# Patient Record
Sex: MALE | Race: WHITE | NOT HISPANIC OR LATINO | Employment: OTHER | URBAN - METROPOLITAN AREA
[De-identification: names, ages, dates, MRNs, and addresses within clinical notes are randomized per-mention and may not be internally consistent; named-entity substitution may affect disease eponyms.]

---

## 2017-04-04 ENCOUNTER — HOSPITAL ENCOUNTER (OUTPATIENT)
Dept: LAB | Facility: MEDICAL CENTER | Age: 67
End: 2017-04-04
Attending: INTERNAL MEDICINE
Payer: MEDICARE

## 2017-04-04 DIAGNOSIS — E11.9 DIABETES TYPE 2, CONTROLLED (HCC): ICD-10-CM

## 2017-04-04 DIAGNOSIS — E78.5 DYSLIPIDEMIA: ICD-10-CM

## 2017-04-04 LAB
ALBUMIN SERPL BCP-MCNC: 4.4 G/DL (ref 3.2–4.9)
ALBUMIN/GLOB SERPL: 1.5 G/DL
ALP SERPL-CCNC: 77 U/L (ref 30–99)
ALT SERPL-CCNC: 26 U/L (ref 2–50)
ANION GAP SERPL CALC-SCNC: 5 MMOL/L (ref 0–11.9)
AST SERPL-CCNC: 24 U/L (ref 12–45)
BILIRUB SERPL-MCNC: 0.7 MG/DL (ref 0.1–1.5)
BUN SERPL-MCNC: 16 MG/DL (ref 8–22)
CALCIUM SERPL-MCNC: 9.4 MG/DL (ref 8.4–10.2)
CHLORIDE SERPL-SCNC: 99 MMOL/L (ref 96–112)
CHOLEST SERPL-MCNC: 112 MG/DL (ref 100–199)
CO2 SERPL-SCNC: 29 MMOL/L (ref 20–33)
CREAT SERPL-MCNC: 0.77 MG/DL (ref 0.5–1.4)
CREAT UR-MCNC: 101.8 MG/DL
EST. AVERAGE GLUCOSE BLD GHB EST-MCNC: 114 MG/DL
GFR SERPL CREATININE-BSD FRML MDRD: >60 ML/MIN/1.73 M 2
GLOBULIN SER CALC-MCNC: 3 G/DL (ref 1.9–3.5)
GLUCOSE SERPL-MCNC: 107 MG/DL (ref 65–99)
HBA1C MFR BLD: 5.6 % (ref 0–5.6)
HDLC SERPL-MCNC: 29 MG/DL
LDLC SERPL CALC-MCNC: 69 MG/DL
MICROALBUMIN UR-MCNC: <0.7 MG/DL
MICROALBUMIN/CREAT UR: NORMAL MG/G (ref 0–30)
POTASSIUM SERPL-SCNC: 3.7 MMOL/L (ref 3.6–5.5)
PROT SERPL-MCNC: 7.4 G/DL (ref 6–8.2)
SODIUM SERPL-SCNC: 133 MMOL/L (ref 135–145)
TRIGL SERPL-MCNC: 70 MG/DL (ref 0–149)

## 2017-04-04 PROCEDURE — 82570 ASSAY OF URINE CREATININE: CPT

## 2017-04-04 PROCEDURE — 82043 UR ALBUMIN QUANTITATIVE: CPT

## 2017-04-04 PROCEDURE — 36415 COLL VENOUS BLD VENIPUNCTURE: CPT

## 2017-04-04 PROCEDURE — 83036 HEMOGLOBIN GLYCOSYLATED A1C: CPT

## 2017-04-04 PROCEDURE — 80053 COMPREHEN METABOLIC PANEL: CPT

## 2017-04-04 PROCEDURE — 80061 LIPID PANEL: CPT

## 2017-04-06 ENCOUNTER — OFFICE VISIT (OUTPATIENT)
Dept: MEDICAL GROUP | Facility: MEDICAL CENTER | Age: 67
End: 2017-04-06
Payer: MEDICARE

## 2017-04-06 VITALS
OXYGEN SATURATION: 96 % | WEIGHT: 225 LBS | RESPIRATION RATE: 16 BRPM | HEART RATE: 80 BPM | DIASTOLIC BLOOD PRESSURE: 64 MMHG | BODY MASS INDEX: 27.4 KG/M2 | TEMPERATURE: 98.7 F | HEIGHT: 76 IN | SYSTOLIC BLOOD PRESSURE: 132 MMHG

## 2017-04-06 DIAGNOSIS — E11.69 CONTROLLED TYPE 2 DIABETES MELLITUS WITH OTHER SPECIFIED COMPLICATION, WITHOUT LONG-TERM CURRENT USE OF INSULIN (HCC): ICD-10-CM

## 2017-04-06 DIAGNOSIS — E11.3299 MILD NONPROLIFERATIVE DIABETIC RETINOPATHY WITHOUT MACULAR EDEMA ASSOCIATED WITH TYPE 2 DIABETES MELLITUS (HCC): ICD-10-CM

## 2017-04-06 DIAGNOSIS — I10 HTN (HYPERTENSION), BENIGN: Chronic | ICD-10-CM

## 2017-04-06 DIAGNOSIS — E11.36 CATARACT, DIABETIC (HCC): Chronic | ICD-10-CM

## 2017-04-06 DIAGNOSIS — Z00.00 MEDICARE ANNUAL WELLNESS VISIT, SUBSEQUENT: ICD-10-CM

## 2017-04-06 DIAGNOSIS — K59.00 CONSTIPATION, UNSPECIFIED CONSTIPATION TYPE: ICD-10-CM

## 2017-04-06 PROBLEM — E11.9 CONTROLLED TYPE 2 DIABETES MELLITUS, WITHOUT LONG-TERM CURRENT USE OF INSULIN (HCC): Status: ACTIVE | Noted: 2017-04-06

## 2017-04-06 PROCEDURE — 1036F TOBACCO NON-USER: CPT | Performed by: INTERNAL MEDICINE

## 2017-04-06 PROCEDURE — 1101F PT FALLS ASSESS-DOCD LE1/YR: CPT | Performed by: INTERNAL MEDICINE

## 2017-04-06 PROCEDURE — 4040F PNEUMOC VAC/ADMIN/RCVD: CPT | Performed by: INTERNAL MEDICINE

## 2017-04-06 PROCEDURE — 3044F HG A1C LEVEL LT 7.0%: CPT | Performed by: INTERNAL MEDICINE

## 2017-04-06 PROCEDURE — 3017F COLORECTAL CA SCREEN DOC REV: CPT | Mod: 8P | Performed by: INTERNAL MEDICINE

## 2017-04-06 PROCEDURE — 99213 OFFICE O/P EST LOW 20 MIN: CPT | Mod: 25 | Performed by: INTERNAL MEDICINE

## 2017-04-06 PROCEDURE — G8417 CALC BMI ABV UP PARAM F/U: HCPCS | Performed by: INTERNAL MEDICINE

## 2017-04-06 PROCEDURE — G8510 SCR DEP NEG, NO PLAN REQD: HCPCS | Performed by: INTERNAL MEDICINE

## 2017-04-06 PROCEDURE — G0439 PPPS, SUBSEQ VISIT: HCPCS | Mod: 25 | Performed by: INTERNAL MEDICINE

## 2017-04-06 RX ORDER — LISINOPRIL 40 MG/1
40 TABLET ORAL DAILY
Qty: 90 TAB | Refills: 3 | Status: SHIPPED | OUTPATIENT
Start: 2017-04-06 | End: 2018-04-05 | Stop reason: SDUPTHER

## 2017-04-06 RX ORDER — HYDROCHLOROTHIAZIDE 25 MG/1
25 TABLET ORAL DAILY
Qty: 90 TAB | Refills: 3 | Status: SHIPPED | OUTPATIENT
Start: 2017-04-06 | End: 2018-04-05 | Stop reason: SDUPTHER

## 2017-04-06 RX ORDER — ATORVASTATIN CALCIUM 40 MG/1
40 TABLET, FILM COATED ORAL DAILY
Qty: 90 TAB | Refills: 3 | Status: SHIPPED | OUTPATIENT
Start: 2017-04-06 | End: 2018-04-05 | Stop reason: SDUPTHER

## 2017-04-06 ASSESSMENT — PATIENT HEALTH QUESTIONNAIRE - PHQ9: CLINICAL INTERPRETATION OF PHQ2 SCORE: 0
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
## 2017-04-06 NOTE — MR AVS SNAPSHOT
"        Matthew Perez   2017 11:20 AM   Office Visit   MRN: [id number]    Department:  [address] [place] [address]   Dept Phone:  [phone]    Description:  Male : 1950   Provider:  Avila Coker M.D.           Reason for Visit     Annual Exam     Medication Refill           Allergies as of 2017     No Known Allergies      You were diagnosed with     Medicare annual wellness visit, subsequent   [957150]       Controlled type 2 diabetes mellitus with other specified complication, without long-term current use of insulin (CMS-HCC)   [1471245]       Mild nonproliferative diabetic retinopathy without macular edema associated with type 2 diabetes mellitus ([facility])   [7100096]       Cataract, diabetic (CMS-[facility])   [091871]       HTN (hypertension), benign   [002062]       Constipation, unspecified constipation type   [4184325]         Vital Signs     Blood Pressure Pulse Temperature Respirations Height Weight    132/64 mmHg 80 37.1 °C (98.7 °F) 16 1.93 m (6' 4\") 102.059 kg (225 lb)    Body Mass Index Oxygen Saturation Smoking Status             27.40 kg/m2 96% Former Smoker         Basic Information     Date Of Birth Sex Race Ethnicity Preferred Language    1950 Male White Non- English      Problem List              ICD-10-CM Priority Class Noted - Resolved    HTN (hypertension), benign (Chronic) I10   3/22/2010 - Present    Primary localized osteoarthrosis, pelvic region and thigh M16.10   10/21/2014 - Present    Cataract, diabetic (CMS-HCC) (Chronic) E11.36   2015 - Present    Diabetic retinopathy (CMS-HCC) E11.319 High  2015 - Present    Controlled type 2 diabetes mellitus, without long-term current use of insulin (CMS-HCC) E11.9   2017 - Present    Constipation K59.00   2017 - Present      Health Maintenance        Date Due Completion Dates    RETINAL SCREENING 2017, 10/31/2013    COLONOSCOPY 2017    A1C SCREENING 10/4/2017 2017, 2016, " 3/30/2016, 9/21/2015, 4/1/2015, 9/19/2014, 4/14/2014, 10/8/2013, 4/10/2013, 9/27/2012, 3/21/2012, 4/4/2011, 11/15/2010, 4/16/2010    FASTING LIPID PROFILE 4/4/2018 4/4/2017, 3/30/2016, 4/1/2015, 9/19/2014, 10/8/2013, 9/27/2012, 3/21/2012, 4/4/2011, 11/15/2010, 11/23/2009, 11/3/2008, 9/2/2008, 8/13/2007    URINE ACR / MICROALBUMIN 4/4/2018 4/4/2017, 3/30/2016, 4/1/2015, 9/19/2014, 10/8/2013, 9/27/2012    SERUM CREATININE 4/4/2018 4/4/2017, 9/27/2016, 3/30/2016, 4/1/2015, 1/22/2015, 1/21/2015, 1/13/2015, 10/22/2014, 10/10/2014, 9/19/2014, 4/14/2014, 10/8/2013, 9/27/2012, 3/21/2012, 4/4/2011, 11/15/2010, 4/12/2010, 9/2/2008, 8/13/2007    DIABETES MONOFILAMENT / LE EXAM 4/6/2018 4/6/2017, 4/4/2016, 2/24/2015, 10/11/2013, 3/28/2012 (Done)    Override on 3/28/2012: Done    IMM PNEUMOCOCCAL 65+ (ADULT) LOW/MEDIUM RISK SERIES (2 of 2 - PPSV23) 10/11/2018 9/30/2015, 10/11/2013    IMM DTaP/Tdap/Td Vaccine (2 - Td) 10/11/2022 10/11/2012            Current Immunizations     13-VALENT PCV PREVNAR 9/30/2015  5:03 PM    Influenza TIV (IM) 9/21/2014, 10/11/2013  2:00 PM, 9/27/2012, 10/28/2011    Influenza Vaccine Adult HD 10/1/2016 11:15 AM, 10/4/2014    Influenza Vaccine Quad Inj (Preserved) 9/29/2015    Pneumococcal polysaccharide vaccine (PPSV-23) 10/11/2013  2:00 PM    SHINGLES VACCINE 9/29/2016 11:23 AM    Tdap Vaccine 10/11/2012      Below and/or attached are the medications your provider expects you to take. Review all of your home medications and newly ordered medications with your provider and/or pharmacist. Follow medication instructions as directed by your provider and/or pharmacist. Please keep your medication list with you and share with your provider. Update the information when medications are discontinued, doses are changed, or new medications (including over-the-counter products) are added; and carry medication information at all times in the event of emergency situations     Allergies:  No Known Allergies             Medications  Valid as of: April 06, 2017 - 11:47 AM    Generic Name Brand Name Tablet Size Instructions for use    Aspirin (Tab)  MG Take 325 mg by mouth 2 Times a Day.        Atorvastatin Calcium (Tab) LIPITOR 40 MG Take 1 Tab by mouth every day.        Coenzyme Q10   Take  by mouth 2 Times a Day.        HydroCHLOROthiazide (Tab) HYDRODIURIL 25 MG Take 1 Tab by mouth every day.        Lisinopril (Tab) PRINIVIL, ZESTRIL 40 MG Take 1 Tab by mouth every day.        MetFORMIN HCl (Tab) GLUCOPHAGE 500 MG Take 1 Tab by mouth 2 times a day, with meals.        Multiple Vitamins-Minerals (Tab) THERAGRAN-M  Take 1 Tab by mouth every morning.        .                 Medicines prescribed today were sent to:     [facility] [address] [place] [address] [place] - [address]    [address] [place] [address]    Phone: [phone] Fax: [phone]    Open 24 Hours?: No      Medication refill instructions:       If your prescription bottle indicates you have medication refills left, it is not necessary to call your provider’s office. Please contact your pharmacy and they will refill your medication.    If your prescription bottle indicates you do not have any refills left, you may request refills at any time through one of the following ways: The online [employer] system (except Urgent Care), by calling your provider’s office, or by asking your pharmacy to contact your provider’s office with a refill request. Medication refills are processed only during regular business hours and may not be available until the next business day. Your provider may request additional information or to have a follow-up visit with you prior to refilling your medication.   *Please Note: Medication refills are assigned a new Rx number when refilled electronically. Your pharmacy may indicate that no refills were authorized even though a new prescription for the same medication is available at the pharmacy. Please request the medicine by name  with the pharmacy before contacting your provider for a refill.        Other Notes About Your Plan     Patient is enrolled in [facility] with Dr. Coker.           MyChart Access Code: Activation code not generated  Current MyChart Status: Active

## 2017-10-10 ENCOUNTER — APPOINTMENT (OUTPATIENT)
Dept: SOCIAL WORK | Facility: CLINIC | Age: 67
End: 2017-10-10
Payer: MEDICARE

## 2017-10-10 PROCEDURE — G0008 ADMIN INFLUENZA VIRUS VAC: HCPCS | Performed by: REGISTERED NURSE

## 2017-10-10 PROCEDURE — 90662 IIV NO PRSV INCREASED AG IM: CPT | Performed by: REGISTERED NURSE

## 2017-10-24 ENCOUNTER — OFFICE VISIT (OUTPATIENT)
Dept: MEDICAL GROUP | Facility: MEDICAL CENTER | Age: 67
End: 2017-10-24
Payer: MEDICARE

## 2017-10-24 VITALS
HEIGHT: 76 IN | RESPIRATION RATE: 16 BRPM | OXYGEN SATURATION: 96 % | SYSTOLIC BLOOD PRESSURE: 126 MMHG | BODY MASS INDEX: 27.06 KG/M2 | HEART RATE: 76 BPM | WEIGHT: 222.2 LBS | TEMPERATURE: 97.5 F | DIASTOLIC BLOOD PRESSURE: 76 MMHG

## 2017-10-24 DIAGNOSIS — F51.01 PRIMARY INSOMNIA: ICD-10-CM

## 2017-10-24 DIAGNOSIS — E78.5 DYSLIPIDEMIA: ICD-10-CM

## 2017-10-24 DIAGNOSIS — E11.69 CONTROLLED TYPE 2 DIABETES MELLITUS WITH OTHER SPECIFIED COMPLICATION, WITHOUT LONG-TERM CURRENT USE OF INSULIN (HCC): ICD-10-CM

## 2017-10-24 DIAGNOSIS — J30.1 CHRONIC SEASONAL ALLERGIC RHINITIS DUE TO POLLEN: ICD-10-CM

## 2017-10-24 DIAGNOSIS — K59.09 OTHER CONSTIPATION: ICD-10-CM

## 2017-10-24 DIAGNOSIS — Z12.11 SCREEN FOR COLON CANCER: ICD-10-CM

## 2017-10-24 LAB
HBA1C MFR BLD: 5.7 % (ref ?–5.8)
INT CON NEG: NEGATIVE
INT CON POS: POSITIVE

## 2017-10-24 PROCEDURE — 83036 HEMOGLOBIN GLYCOSYLATED A1C: CPT | Performed by: INTERNAL MEDICINE

## 2017-10-24 PROCEDURE — 99214 OFFICE O/P EST MOD 30 MIN: CPT | Performed by: INTERNAL MEDICINE

## 2017-10-24 NOTE — PROGRESS NOTES
CC: Follow-up multiple issues    HPI:   Matthew presents today with the following.    1. Controlled type 2 diabetes mellitus with other specified complication, without long-term current use of insulin (CMS-HCC)  A1c checked in office today value of 5.7 reports his diet and weight have been slightly worse over the last few months.    2. Primary insomnia  He does report difficulty sleeping fairly falling asleep. Denies any depressive symptoms and no other barriers for sleep.    3. Chronic seasonal allergic rhinitis due to pollen  Does have seasonal allergies reporting using Nasacort.    4. Other constipation  He does suffer from constipation as well been on multiple fiber supplements. Denies any bloody stool or dark tarry stool.    5. Screen for colon cancer  He is due for colonoscopy.    6. Dyslipidemia  Cholesterol well controlled as of last check maintain on medication without myalgia.      Patient Active Problem List    Diagnosis Date Noted   • Diabetic retinopathy (CMS-HCC) 05/30/2015     Priority: High   • Dyslipidemia 10/24/2017   • Controlled type 2 diabetes mellitus, without long-term current use of insulin (CMS-HCC) 04/06/2017   • Constipation 04/06/2017   • Cataract, diabetic (CMS-HCC) 04/02/2015   • Primary localized osteoarthrosis, pelvic region and thigh 10/21/2014   • HTN (hypertension), benign 03/22/2010       Current Outpatient Prescriptions   Medication Sig Dispense Refill   • metformin (GLUCOPHAGE) 500 MG Tab Take 1 Tab by mouth 2 times a day, with meals. 180 Tab 3   • hydrochlorothiazide (HYDRODIURIL) 25 MG Tab Take 1 Tab by mouth every day. 90 Tab 3   • lisinopril (PRINIVIL, ZESTRIL) 40 MG tablet Take 1 Tab by mouth every day. 90 Tab 3   • atorvastatin (LIPITOR) 40 MG Tab Take 1 Tab by mouth every day. 90 Tab 3   • aspirin (ASA) 325 MG TABS Take 325 mg by mouth 2 Times a Day.     • Coenzyme Q10 (CO Q 10 PO) Take  by mouth 2 Times a Day.     • therapeutic multivitamin-minerals (THERAGRAN-M) TABS  "Take 1 Tab by mouth every morning.       No current facility-administered medications for this visit.          Allergies as of 10/24/2017   • (No Known Allergies)        ROS: As per HPI.    /76   Pulse 76   Temp 36.4 °C (97.5 °F)   Resp 16   Ht 1.93 m (6' 4\")   Wt 100.8 kg (222 lb 3.2 oz)   SpO2 96%   BMI 27.05 kg/m²      Physical Exam:  Gen:         Alert and oriented, No apparent distress.  Neck:        No Lymphadenopathy or Bruits.  Lungs:     Clear to auscultation bilaterally  CV:          Regular rate and rhythm. No murmurs, rubs or gallops.               Ext:          No clubbing, cyanosis, edema.      Assessment and Plan.   67 y.o. male with the following issues.    1. Controlled type 2 diabetes mellitus with other specified complication, without long-term current use of insulin (CMS-[facility])  Currently well controlled no changes. Discussed diet and exercise recheck A1c in 6 months. Reminded about yearly eye exam.  - POCT  A1C  - COMP METABOLIC PANEL; Future  - LIPID PROFILE; Future  - HEMOGLOBIN A1C; Future  - MICROALBUMIN CREAT RATIO URINE; Future    2. Primary insomnia  Have recommended over-the-counter as if not improving follow-up.    3. Chronic seasonal allergic rhinitis due to pollen  Again addition of over-the-counter medications follow-up not improving. Caution about urinary retention from Benadryl.    4. Other constipation  Given his chronic constipation he is due for colonoscopy he will meet with the GI doctors prior to his procedure  - REFERRAL TO GASTROENTEROLOGY    5. Screen for colon cancer    - REFERRAL TO GASTROENTEROLOGY    6. Dyslipidemia  Lipids currently well controlled. Discussed continued diet and exercise recheck 6 months to 1 year.      "

## 2018-04-03 ENCOUNTER — HOSPITAL ENCOUNTER (OUTPATIENT)
Dept: LAB | Facility: MEDICAL CENTER | Age: 68
End: 2018-04-03
Attending: INTERNAL MEDICINE
Payer: MEDICARE

## 2018-04-03 DIAGNOSIS — E11.69 CONTROLLED TYPE 2 DIABETES MELLITUS WITH OTHER SPECIFIED COMPLICATION, WITHOUT LONG-TERM CURRENT USE OF INSULIN (HCC): ICD-10-CM

## 2018-04-03 LAB
ALBUMIN SERPL BCP-MCNC: 4.1 G/DL (ref 3.2–4.9)
ALBUMIN/GLOB SERPL: 1.5 G/DL
ALP SERPL-CCNC: 70 U/L (ref 30–99)
ALT SERPL-CCNC: 24 U/L (ref 2–50)
ANION GAP SERPL CALC-SCNC: 4 MMOL/L (ref 0–11.9)
AST SERPL-CCNC: 21 U/L (ref 12–45)
BILIRUB SERPL-MCNC: 0.6 MG/DL (ref 0.1–1.5)
BUN SERPL-MCNC: 16 MG/DL (ref 8–22)
CALCIUM SERPL-MCNC: 9.8 MG/DL (ref 8.5–10.5)
CHLORIDE SERPL-SCNC: 100 MMOL/L (ref 96–112)
CHOLEST SERPL-MCNC: 88 MG/DL (ref 100–199)
CO2 SERPL-SCNC: 30 MMOL/L (ref 20–33)
CREAT SERPL-MCNC: 0.8 MG/DL (ref 0.5–1.4)
CREAT UR-MCNC: 112 MG/DL
EST. AVERAGE GLUCOSE BLD GHB EST-MCNC: 126 MG/DL
GLOBULIN SER CALC-MCNC: 2.8 G/DL (ref 1.9–3.5)
GLUCOSE SERPL-MCNC: 101 MG/DL (ref 65–99)
HBA1C MFR BLD: 6 % (ref 0–5.6)
HDLC SERPL-MCNC: 27 MG/DL
LDLC SERPL CALC-MCNC: 47 MG/DL
MICROALBUMIN UR-MCNC: <0.7 MG/DL
MICROALBUMIN/CREAT UR: NORMAL MG/G (ref 0–30)
POTASSIUM SERPL-SCNC: 4 MMOL/L (ref 3.6–5.5)
PROT SERPL-MCNC: 6.9 G/DL (ref 6–8.2)
SODIUM SERPL-SCNC: 134 MMOL/L (ref 135–145)
TRIGL SERPL-MCNC: 70 MG/DL (ref 0–149)

## 2018-04-03 PROCEDURE — 80053 COMPREHEN METABOLIC PANEL: CPT

## 2018-04-03 PROCEDURE — 80061 LIPID PANEL: CPT

## 2018-04-03 PROCEDURE — 36415 COLL VENOUS BLD VENIPUNCTURE: CPT

## 2018-04-03 PROCEDURE — 82043 UR ALBUMIN QUANTITATIVE: CPT

## 2018-04-03 PROCEDURE — 82570 ASSAY OF URINE CREATININE: CPT

## 2018-04-03 PROCEDURE — 83036 HEMOGLOBIN GLYCOSYLATED A1C: CPT

## 2018-04-05 ENCOUNTER — OFFICE VISIT (OUTPATIENT)
Dept: MEDICAL GROUP | Facility: MEDICAL CENTER | Age: 68
End: 2018-04-05
Payer: MEDICARE

## 2018-04-05 VITALS
TEMPERATURE: 97.6 F | HEIGHT: 76 IN | OXYGEN SATURATION: 96 % | SYSTOLIC BLOOD PRESSURE: 114 MMHG | HEART RATE: 66 BPM | WEIGHT: 236 LBS | DIASTOLIC BLOOD PRESSURE: 76 MMHG | BODY MASS INDEX: 28.74 KG/M2 | RESPIRATION RATE: 16 BRPM

## 2018-04-05 DIAGNOSIS — E11.36 CATARACT, DIABETIC (HCC): Chronic | ICD-10-CM

## 2018-04-05 DIAGNOSIS — Z00.00 MEDICARE ANNUAL WELLNESS VISIT, SUBSEQUENT: ICD-10-CM

## 2018-04-05 DIAGNOSIS — E11.69 CONTROLLED TYPE 2 DIABETES MELLITUS WITH OTHER SPECIFIED COMPLICATION, WITHOUT LONG-TERM CURRENT USE OF INSULIN (HCC): ICD-10-CM

## 2018-04-05 DIAGNOSIS — E11.3299 MILD NONPROLIFERATIVE DIABETIC RETINOPATHY WITHOUT MACULAR EDEMA ASSOCIATED WITH TYPE 2 DIABETES MELLITUS, UNSPECIFIED LATERALITY (HCC): ICD-10-CM

## 2018-04-05 DIAGNOSIS — E78.5 DYSLIPIDEMIA: ICD-10-CM

## 2018-04-05 DIAGNOSIS — I10 HTN (HYPERTENSION), BENIGN: Chronic | ICD-10-CM

## 2018-04-05 DIAGNOSIS — Z12.11 SCREEN FOR COLON CANCER: ICD-10-CM

## 2018-04-05 PROCEDURE — 99999 PR NO CHARGE: CPT | Performed by: INTERNAL MEDICINE

## 2018-04-05 PROCEDURE — G0439 PPPS, SUBSEQ VISIT: HCPCS | Performed by: INTERNAL MEDICINE

## 2018-04-05 RX ORDER — HYDROCHLOROTHIAZIDE 25 MG/1
25 TABLET ORAL DAILY
Qty: 90 TAB | Refills: 3 | Status: SHIPPED | OUTPATIENT
Start: 2018-04-05 | End: 2019-04-05 | Stop reason: SDUPTHER

## 2018-04-05 RX ORDER — LISINOPRIL 40 MG/1
40 TABLET ORAL DAILY
Qty: 90 TAB | Refills: 3 | Status: SHIPPED | OUTPATIENT
Start: 2018-04-05 | End: 2019-04-05 | Stop reason: SDUPTHER

## 2018-04-05 RX ORDER — ATORVASTATIN CALCIUM 40 MG/1
40 TABLET, FILM COATED ORAL DAILY
Qty: 90 TAB | Refills: 3 | Status: SHIPPED | OUTPATIENT
Start: 2018-04-05 | End: 2019-04-05 | Stop reason: SDUPTHER

## 2018-04-05 ASSESSMENT — PATIENT HEALTH QUESTIONNAIRE - PHQ9: CLINICAL INTERPRETATION OF PHQ2 SCORE: 0

## 2018-04-05 ASSESSMENT — ACTIVITIES OF DAILY LIVING (ADL): BATHING_REQUIRES_ASSISTANCE: 0

## 2018-04-05 NOTE — PROGRESS NOTES
CC: Follow-up diabetes due for wellness examination.    HPI:   Matthew presents today with the following.  Carl describes their overall health as excellent.    1. Medicare annual wellness visit, subsequent  Screenings performed below    2. Controlled type 2 diabetes mellitus with other specified complication, without long-term current use of insulin (CMS-[facility])  Presents having had blood work A1c has climbed slightly to 6.0 maintain on metformin. Is compliant with medications weight is up significantly since last visit. He reports decreased activity and poor dietary choices. He is planning on going back on his diet and getting more exercise as the weather warms up.        Information for advance directives given to patient or instructed to bring in advance directives into to office to put in chart.      Depression Screening    Little interest or pleasure in doing things?  0 - not at all  Feeling down, depressed , or hopeless? 0 - not at all  Patient Health Questionnaire Score: 0     If depressive symptoms identified deferred to follow up visit unless specifically addressed in assessment and plan.    Interpretation of PHQ-9 Total Score   Score Severity   1-4 No Depression   5-9 Mild Depression   10-14 Moderate Depression   15-19 Moderately Severe Depression   20-27 Severe Depression    Screening for Cognitive Impairment    Three Minute Recall (apple, watch, gabrielle)  3/3    Draw clock face with all 12 numbers set to the hand to show 10 minutes past 11 o'clock  1    Cognitive concerns identified deferred for follow up unless specifically addressed in assessment and plan.    Fall Risk Assessment    Has the patient had two or more falls in the last year or any fall with injury in the last year?  No    Safety Assessment    Throw rugs on floor.  No  Handrails on all stairs.  Yes  Good lighting in all hallways.  Yes  Difficulty hearing.  No  Patient counseled about all safety risks that were identified.    Functional  Assessment ADLs    Are there any barriers preventing you from cooking for yourself or meeting nutritional needs?  No.    Are there any barriers preventing you from driving safely or obtaining transportation?  No.    Are there any barriers preventing you from using a telephone or calling for help?  No.    Are there any barriers preventing you from shopping?  No.    Are there any barriers preventing you from taking care of your own finances?  No.    Are there any barriers preventing you from managing your medications?  No.    Are there any barriers preventing you from showering, bathing or dressing yourself?  No.    Are currently engaging any exercise or physical activity?  Yes.       Health Maintenance Summary                COLON CANCER SCREENING ANNUAL FIT Overdue 1/24/1999     DIABETES MONOFILAMENT / LE EXAM Next Due 4/6/2018      Done 4/6/2017 AMB DIABETIC MONOFILAMENT LOWER EXTREMITY EXAM     Patient has more history with this topic...    Annual Wellness Visit Next Due 4/7/2018      Done 4/6/2017 Visit Dx: Medicare annual wellness visit, subsequent     Patient has more history with this topic...    RETINAL SCREENING Next Due 5/30/2018      Done 5/30/2017 REFERRAL FOR RETINAL SCREENING EXAM     Patient has more history with this topic...    A1C SCREENING Next Due 10/3/2018      Done 4/3/2018 HEMOGLOBIN A1C      Patient has more history with this topic...    IMM PNEUMOCOCCAL 65+ (ADULT) LOW/MEDIUM RISK SERIES Next Due 10/11/2018      Done 9/30/2015 Imm Admin: Pneumococcal Conjugate Vaccine (Prevnar/PCV-13)     Patient has more history with this topic...    FASTING LIPID PROFILE Next Due 4/3/2019      Done 4/3/2018 LIPID PROFILE      Patient has more history with this topic...    URINE ACR / MICROALBUMIN Next Due 4/3/2019      Done 4/3/2018 MICROALBUMIN CREAT RATIO URINE     Patient has more history with this topic...    SERUM CREATININE Next Due 4/3/2019      Done 4/3/2018 COMP METABOLIC PANEL      Patient has  more history with this topic...    IMM DTaP/Tdap/Td Vaccine Next Due 10/11/2022      Done 10/11/2012 Imm Admin: Tdap Vaccine          Patient Care Team:  Avila Coker M.D. as PCP - General  Courtney Noyola R.N. as Medical Home Care Manager  Andrew Gonzalez M.D. as Consulting Physician (Ophthalmology)            Health Care Screening: Age-appropriate preventive services that Medicare covers were discussed today and ordered as indicated and agreed upon by the patient, and as recommended by USPTF and ACIP.     Patient Active Problem List    Diagnosis Date Noted   • Diabetic retinopathy (CMS-HCC) 05/30/2015     Priority: High   • Dyslipidemia 10/24/2017   • Controlled type 2 diabetes mellitus, without long-term current use of insulin (CMS-HCC) 04/06/2017   • Constipation 04/06/2017   • Cataract, diabetic (CMS-HCC) 04/02/2015   • Primary localized osteoarthrosis, pelvic region and thigh 10/21/2014   • HTN (hypertension), benign 03/22/2010       Current Outpatient Prescriptions   Medication Sig Dispense Refill   • metFORMIN (GLUCOPHAGE) 500 MG Tab Take 1 Tab by mouth 2 times a day, with meals. 180 Tab 3   • hydroCHLOROthiazide (HYDRODIURIL) 25 MG Tab Take 1 Tab by mouth every day. 90 Tab 3   • lisinopril (PRINIVIL, ZESTRIL) 40 MG tablet Take 1 Tab by mouth every day. 90 Tab 3   • atorvastatin (LIPITOR) 40 MG Tab Take 1 Tab by mouth every day. 90 Tab 3   • aspirin (ASA) 325 MG TABS Take 325 mg by mouth 2 Times a Day.     • Coenzyme Q10 (CO Q 10 PO) Take  by mouth 2 Times a Day.     • therapeutic multivitamin-minerals (THERAGRAN-M) TABS Take 1 Tab by mouth every morning.       No current facility-administered medications for this visit.        Family History   Problem Relation Age of Onset   • Cancer Father    • Cancer Brother        Social History     Social History   • Marital status:      Spouse name: N/A   • Number of children: N/A   • Years of education: N/A     Occupational History   • Not on file.  "    Social History Main Topics   • Smoking status: Former Smoker     Packs/day: 1.00     Years: 12.00     Types: Cigarettes     Quit date: 1/1/1979   • Smokeless tobacco: Never Used   • Alcohol use 1.8 oz/week     3 Glasses of wine per week      Comment: 3 per week   • Drug use: No   • Sexual activity: Yes     Partners: Female     Other Topics Concern   • Not on file     Social History Narrative   • No narrative on file       Past Surgical History:   Procedure Laterality Date   • HIP ARTHROPLASTY TOTAL  1/20/2015    Performed by Ziyad Tang M.D. at SURGERY [facility]   • HIP ARTHROPLASTY TOTAL  10/21/2014    Performed by Ziyad Tang M.D. at SURGERY [facility]   • OTHER ORTHOPEDIC SURGERY  1990    right knee arthroscopy   • OTHER      TONSILLECTOMY       Allergies as of 04/05/2018   • (No Known Allergies)        ROS: Denies Chest pain, SOB, LE edema.    /76   Pulse 66   Temp 36.4 °C (97.6 °F)   Resp 16   Ht 1.93 m (6' 4\")   Wt 107 kg (236 lb)   SpO2 96%   BMI 28.73 kg/m²      Physical Exam:  Gen:         Alert and oriented, No apparent distress.  Neck:        No Lymphadenopathy or Bruits.  Lungs:     Clear to auscultation bilaterally  CV:          Regular rate and rhythm. No murmurs, rubs or gallops.  Abd:         Soft non tender, non distended. Normal active bowel sounds.  No  Hepatosplenomegaly, No pulsatile masses.                   Ext:          No clubbing, cyanosis, edema.      Assessment and Plan.   68 y.o. male with the following issues.    1. Medicare annual wellness visit, subsequent  Discussed healthy lifestyle habits as well as screening regimens.    2. Controlled type 2 diabetes mellitus with other specified complication, without long-term current use of insulin (CMS-HCC)  Currently well controlled no changes. Discussed diet and exercise recheck A1c in 6 months. Reminded about yearly eye exam. Refilled medications  - metFORMIN (GLUCOPHAGE) 500 MG Tab; Take 1 Tab by " mouth 2 times a day, with meals.  Dispense: 180 Tab; Refill: 3    3. HTN (hypertension), benign  Currently well controlled, Discuss diet, exercise and salt restriction.  No change to medication therapy.   - hydroCHLOROthiazide (HYDRODIURIL) 25 MG Tab; Take 1 Tab by mouth every day.  Dispense: 90 Tab; Refill: 3  - lisinopril (PRINIVIL, ZESTRIL) 40 MG tablet; Take 1 Tab by mouth every day.  Dispense: 90 Tab; Refill: 3    4. Mild nonproliferative diabetic retinopathy without macular edema associated with type 2 diabetes mellitus, unspecified laterality (CMS-HCC)  Followed by ophthalmology coming due for an appointment.    5. Cataract, diabetic (CMS-[facility])  Again followed by ophthalmology status post procedure doing well.    6. Dyslipidemia  Lipids currently well controlled. Discussed continued diet and exercise recheck 6 months to 1 year.  - atorvastatin (LIPITOR) 40 MG Tab; Take 1 Tab by mouth every day.  Dispense: 90 Tab; Refill: 3    7. Screen for colon cancer    - OCCULT BLOOD FECES IMMUNOASSAY; Future        Referrals offered: Community-based lifestyle interventions to reduce health risks and promote self-management and wellness, fall prevention, nutrition, physical activity, tobacco-use cessation, weight loss, and mental health services as per orders if indicated.    Discussion today about general wellness and lifestyle habits:    · Prevent falls and reduce trip hazards; Cautioned about securing or removing rugs.  · Have a working fire alarm and carbon monoxide detector;   · Engage in regular physical activity and social activities

## 2018-04-18 ENCOUNTER — HOSPITAL ENCOUNTER (OUTPATIENT)
Facility: MEDICAL CENTER | Age: 68
End: 2018-04-18
Attending: INTERNAL MEDICINE
Payer: MEDICARE

## 2018-04-18 PROCEDURE — 82274 ASSAY TEST FOR BLOOD FECAL: CPT

## 2018-04-24 DIAGNOSIS — Z12.11 SCREEN FOR COLON CANCER: ICD-10-CM

## 2018-04-24 LAB — HEMOCCULT STL QL IA: NEGATIVE

## 2018-08-26 ENCOUNTER — PATIENT OUTREACH (OUTPATIENT)
Dept: HEALTH INFORMATION MANAGEMENT | Facility: OTHER | Age: 68
End: 2018-08-26

## 2018-08-26 NOTE — PROGRESS NOTES
Outcome: Left Message    Please transfer to Patient Outreach Team at [phone] when patient returns call.    WebIZ Checked & Epic Updated:  yes    [facility] Verified: yes    Attempt # 1

## 2018-09-06 NOTE — PROGRESS NOTES
Outcome: Left Message    Please transfer to Patient Outreach Team at [phone] when patient returns call.    WebIZ Checked & Epic Updated:  yes    [facility] Verified: yes    Attempt # 2

## 2018-09-17 NOTE — PROGRESS NOTES
Outcome: No answer no VM    Please transfer to Patient Outreach Team at [phone] when patient returns call.    WebIZ Checked & Epic Updated:  yes    [facility] Verified: yes    Attempt # 3

## 2018-09-24 NOTE — PROGRESS NOTES
Outcome: Left Message    Please transfer to Patient Outreach Team at [phone] when patient returns call.    Attempt # 4

## 2018-09-27 NOTE — PROGRESS NOTES
Outcome: Left Message    Please transfer to Patient Outreach Team at [phone] when patient returns call.      Attempt # 5

## 2018-10-09 ENCOUNTER — OFFICE VISIT (OUTPATIENT)
Dept: MEDICAL GROUP | Facility: MEDICAL CENTER | Age: 68
End: 2018-10-09
Payer: MEDICARE

## 2018-10-09 VITALS
TEMPERATURE: 96.9 F | OXYGEN SATURATION: 98 % | SYSTOLIC BLOOD PRESSURE: 124 MMHG | RESPIRATION RATE: 16 BRPM | DIASTOLIC BLOOD PRESSURE: 70 MMHG | HEART RATE: 79 BPM | BODY MASS INDEX: 28.49 KG/M2 | WEIGHT: 234 LBS | HEIGHT: 76 IN

## 2018-10-09 DIAGNOSIS — E11.69 CONTROLLED TYPE 2 DIABETES MELLITUS WITH OTHER SPECIFIED COMPLICATION, WITHOUT LONG-TERM CURRENT USE OF INSULIN (HCC): ICD-10-CM

## 2018-10-09 DIAGNOSIS — E78.5 DYSLIPIDEMIA: ICD-10-CM

## 2018-10-09 DIAGNOSIS — I10 HTN (HYPERTENSION), BENIGN: Chronic | ICD-10-CM

## 2018-10-09 LAB
HBA1C MFR BLD: 5.9 % (ref ?–5.8)
INT CON NEG: NEGATIVE
INT CON POS: POSITIVE

## 2018-10-09 PROCEDURE — 83036 HEMOGLOBIN GLYCOSYLATED A1C: CPT | Performed by: INTERNAL MEDICINE

## 2018-10-09 PROCEDURE — 99214 OFFICE O/P EST MOD 30 MIN: CPT | Performed by: INTERNAL MEDICINE

## 2018-10-09 NOTE — PROGRESS NOTES
CC: Follow-up diabetes, hypertension, dyslipidemia    HPI:   Matthew presents today with the following.    1. Controlled type 2 diabetes mellitus with other specified complication, without long-term current use of insulin ([facility])  Presents due for recheck of A1c last value at 6 slightly improved today at 5.9.  He was not as active this summer as he normally is therefore his weight has not gone back down to a better range.  He does plan on being very cautious over the holidays.  Denying any hypoglycemia    2. HTN (hypertension), benign  Well-controlled on current regimen denying any dizziness reports good readings at home.    3. Dyslipidemia  Maintain on statin without myalgia well-controlled as of last check.      Patient Active Problem List    Diagnosis Date Noted   • Diabetic retinopathy ([facility]) 05/30/2015     Priority: High   • Dyslipidemia 10/24/2017   • Controlled type 2 diabetes mellitus, without long-term current use of insulin ([facility]) 04/06/2017   • Constipation 04/06/2017   • Cataract, diabetic ([facility]) 04/02/2015   • Primary localized osteoarthrosis, pelvic region and thigh 10/21/2014   • HTN (hypertension), benign 03/22/2010       Current Outpatient Prescriptions   Medication Sig Dispense Refill   • metFORMIN (GLUCOPHAGE) 500 MG Tab Take 1 Tab by mouth 2 times a day, with meals. 180 Tab 3   • hydroCHLOROthiazide (HYDRODIURIL) 25 MG Tab Take 1 Tab by mouth every day. 90 Tab 3   • lisinopril (PRINIVIL, ZESTRIL) 40 MG tablet Take 1 Tab by mouth every day. 90 Tab 3   • atorvastatin (LIPITOR) 40 MG Tab Take 1 Tab by mouth every day. 90 Tab 3   • aspirin (ASA) 325 MG TABS Take 325 mg by mouth 2 Times a Day.     • Coenzyme Q10 (CO Q 10 PO) Take  by mouth 2 Times a Day.     • therapeutic multivitamin-minerals (THERAGRAN-M) TABS Take 1 Tab by mouth every morning.       No current facility-administered medications for this visit.          Allergies as of 10/09/2018   • (No Known Allergies)        ROS: Denies Chest pain,  "SOB, LE edema.    /70   Pulse 79   Temp 36.1 °C (96.9 °F)   Resp 16   Ht 1.93 m (6' 3.98\")   Wt 106.1 kg (234 lb)   SpO2 98%   BMI 28.50 kg/m²     Physical Exam:  Gen:         Alert and oriented, No apparent distress.  Neck:        No Lymphadenopathy or Bruits.  Lungs:     Clear to auscultation bilaterally  CV:          Regular rate and rhythm. No murmurs, rubs or gallops.               Ext:          No clubbing, cyanosis, edema.      Assessment and Plan.   68 y.o. male with the following issues.    1. Controlled type 2 diabetes mellitus with other specified complication, without long-term current use of insulin (HCC)  Currently well controlled no changes. Discussed diet and exercise recheck A1c in 6 months. Reminded about yearly eye exam.  - POCT  A1C  - Diabetic Monofilament Lower Extremity Exam  - HEMOGLOBIN A1C; Future  - MICROALBUMIN CREAT RATIO URINE; Future    2. HTN (hypertension), benign  Currently well controlled, Discuss diet, exercise and salt restriction.  No change to medication therapy.    3. Dyslipidemia  Lipids currently well controlled. Discussed continued diet and exercise recheck 6 months to 1 year.  - COMP METABOLIC PANEL; Future  - LIPID PROFILE; Future      "

## 2019-04-01 ENCOUNTER — HOSPITAL ENCOUNTER (OUTPATIENT)
Dept: LAB | Facility: MEDICAL CENTER | Age: 69
End: 2019-04-01
Attending: INTERNAL MEDICINE
Payer: MEDICARE

## 2019-04-01 DIAGNOSIS — E11.69 CONTROLLED TYPE 2 DIABETES MELLITUS WITH OTHER SPECIFIED COMPLICATION, WITHOUT LONG-TERM CURRENT USE OF INSULIN (HCC): ICD-10-CM

## 2019-04-01 DIAGNOSIS — E78.5 DYSLIPIDEMIA: ICD-10-CM

## 2019-04-01 LAB
ALBUMIN SERPL BCP-MCNC: 4.1 G/DL (ref 3.2–4.9)
ALBUMIN/GLOB SERPL: 1.7 G/DL
ALP SERPL-CCNC: 83 U/L (ref 30–99)
ALT SERPL-CCNC: 26 U/L (ref 2–50)
ANION GAP SERPL CALC-SCNC: 7 MMOL/L (ref 0–11.9)
AST SERPL-CCNC: 21 U/L (ref 12–45)
BILIRUB SERPL-MCNC: 0.6 MG/DL (ref 0.1–1.5)
BUN SERPL-MCNC: 14 MG/DL (ref 8–22)
CALCIUM SERPL-MCNC: 9.3 MG/DL (ref 8.5–10.5)
CHLORIDE SERPL-SCNC: 99 MMOL/L (ref 96–112)
CHOLEST SERPL-MCNC: 106 MG/DL (ref 100–199)
CO2 SERPL-SCNC: 30 MMOL/L (ref 20–33)
CREAT SERPL-MCNC: 0.83 MG/DL (ref 0.5–1.4)
FASTING STATUS PATIENT QL REPORTED: NORMAL
GLOBULIN SER CALC-MCNC: 2.4 G/DL (ref 1.9–3.5)
GLUCOSE SERPL-MCNC: 96 MG/DL (ref 65–99)
HDLC SERPL-MCNC: 27 MG/DL
LDLC SERPL CALC-MCNC: 62 MG/DL
POTASSIUM SERPL-SCNC: 3.9 MMOL/L (ref 3.6–5.5)
PROT SERPL-MCNC: 6.5 G/DL (ref 6–8.2)
SODIUM SERPL-SCNC: 136 MMOL/L (ref 135–145)
TRIGL SERPL-MCNC: 84 MG/DL (ref 0–149)

## 2019-04-01 PROCEDURE — 36415 COLL VENOUS BLD VENIPUNCTURE: CPT

## 2019-04-01 PROCEDURE — 80061 LIPID PANEL: CPT

## 2019-04-01 PROCEDURE — 83036 HEMOGLOBIN GLYCOSYLATED A1C: CPT

## 2019-04-01 PROCEDURE — 82043 UR ALBUMIN QUANTITATIVE: CPT

## 2019-04-01 PROCEDURE — 80053 COMPREHEN METABOLIC PANEL: CPT

## 2019-04-01 PROCEDURE — 82570 ASSAY OF URINE CREATININE: CPT

## 2019-04-02 LAB
CREAT UR-MCNC: 93 MG/DL
EST. AVERAGE GLUCOSE BLD GHB EST-MCNC: 143 MG/DL
HBA1C MFR BLD: 6.6 % (ref 0–5.6)
MICROALBUMIN UR-MCNC: <0.7 MG/DL
MICROALBUMIN/CREAT UR: NORMAL MG/G (ref 0–30)

## 2019-04-04 PROBLEM — I70.0 ATHEROSCLEROSIS OF ABDOMINAL AORTA (HCC): Status: ACTIVE | Noted: 2019-04-04

## 2019-04-05 ENCOUNTER — OFFICE VISIT (OUTPATIENT)
Dept: MEDICAL GROUP | Facility: MEDICAL CENTER | Age: 69
End: 2019-04-05
Payer: MEDICARE

## 2019-04-05 VITALS
BODY MASS INDEX: 28.62 KG/M2 | HEART RATE: 83 BPM | SYSTOLIC BLOOD PRESSURE: 136 MMHG | TEMPERATURE: 97.1 F | WEIGHT: 235 LBS | HEIGHT: 76 IN | OXYGEN SATURATION: 94 % | DIASTOLIC BLOOD PRESSURE: 64 MMHG

## 2019-04-05 DIAGNOSIS — E11.3299 MILD NONPROLIFERATIVE DIABETIC RETINOPATHY WITHOUT MACULAR EDEMA ASSOCIATED WITH TYPE 2 DIABETES MELLITUS, UNSPECIFIED LATERALITY (HCC): ICD-10-CM

## 2019-04-05 DIAGNOSIS — E78.5 DYSLIPIDEMIA: ICD-10-CM

## 2019-04-05 DIAGNOSIS — I70.0 ATHEROSCLEROSIS OF ABDOMINAL AORTA (HCC): ICD-10-CM

## 2019-04-05 DIAGNOSIS — Z12.11 SCREEN FOR COLON CANCER: ICD-10-CM

## 2019-04-05 DIAGNOSIS — I10 HTN (HYPERTENSION), BENIGN: Chronic | ICD-10-CM

## 2019-04-05 DIAGNOSIS — Z23 NEED FOR VACCINATION: ICD-10-CM

## 2019-04-05 DIAGNOSIS — Z00.00 MEDICARE ANNUAL WELLNESS VISIT, SUBSEQUENT: ICD-10-CM

## 2019-04-05 DIAGNOSIS — E11.69 CONTROLLED TYPE 2 DIABETES MELLITUS WITH OTHER SPECIFIED COMPLICATION, WITHOUT LONG-TERM CURRENT USE OF INSULIN (HCC): ICD-10-CM

## 2019-04-05 PROCEDURE — 99214 OFFICE O/P EST MOD 30 MIN: CPT | Mod: 25 | Performed by: INTERNAL MEDICINE

## 2019-04-05 PROCEDURE — G0009 ADMIN PNEUMOCOCCAL VACCINE: HCPCS | Performed by: INTERNAL MEDICINE

## 2019-04-05 PROCEDURE — 8041 PR SCP AHA: Performed by: INTERNAL MEDICINE

## 2019-04-05 PROCEDURE — 90732 PPSV23 VACC 2 YRS+ SUBQ/IM: CPT | Performed by: INTERNAL MEDICINE

## 2019-04-05 PROCEDURE — G0439 PPPS, SUBSEQ VISIT: HCPCS | Performed by: INTERNAL MEDICINE

## 2019-04-05 RX ORDER — LISINOPRIL 40 MG/1
40 TABLET ORAL DAILY
Qty: 90 TAB | Refills: 3 | Status: SHIPPED | OUTPATIENT
Start: 2019-04-05 | End: 2019-07-02 | Stop reason: SDUPTHER

## 2019-04-05 RX ORDER — ATORVASTATIN CALCIUM 40 MG/1
40 TABLET, FILM COATED ORAL DAILY
Qty: 90 TAB | Refills: 3 | Status: SHIPPED | OUTPATIENT
Start: 2019-04-05 | End: 2019-06-27 | Stop reason: SDUPTHER

## 2019-04-05 RX ORDER — HYDROCHLOROTHIAZIDE 25 MG/1
25 TABLET ORAL DAILY
Qty: 90 TAB | Refills: 3 | Status: SHIPPED | OUTPATIENT
Start: 2019-04-05 | End: 2019-07-02 | Stop reason: SDUPTHER

## 2019-04-05 ASSESSMENT — ENCOUNTER SYMPTOMS: GENERAL WELL-BEING: EXCELLENT

## 2019-04-05 ASSESSMENT — ACTIVITIES OF DAILY LIVING (ADL): BATHING_REQUIRES_ASSISTANCE: 0

## 2019-04-05 ASSESSMENT — PATIENT HEALTH QUESTIONNAIRE - PHQ9: CLINICAL INTERPRETATION OF PHQ2 SCORE: 0

## 2019-04-05 NOTE — PROGRESS NOTES
Annual Health Assessment Questions:    1.  Are you currently engaging in any exercise or physical activity? Yes    2.  How would you describe your mood or emotional well-being today? good    3.  Have you had any falls in the last year? No    4.  Have you noticed any problems with your balance or had difficulty walking? No    5.  In the last six months have you experienced any leakage of urine? No    6. DPA/Advanced Directive: Patient has Advanced Directive on file.      CC: Follow-up diabetes, hypertension, dyslipidemia.    HPI:   Matthew presents today with the following.    1. Medicare annual wellness visit, subsequent  Screenings performed below advance directives already on file    2. Controlled type 2 diabetes mellitus with other specified complication, without long-term current use of insulin ([facility])  A1c has gone up to 6.6 previously at 5.9 maintain on metformin reporting decreased activity and dietary indiscretions.  He is planning on getting back into his normal habits and is fully confident that his blood sugars will be improved as of next check.    3. Dyslipidemia  Maintain on statin without myalgias requesting refills cholesterol well controlled    4. HTN (hypertension), benign  Blood pressure slightly elevated today he does not monitor at home but does have a cough.  He is compliant with medications.    Depression Screening    Little interest or pleasure in doing things?  0 - not at all  Feeling down, depressed , or hopeless? 0 - not at all  Patient Health Questionnaire Score: 0     If depressive symptoms identified deferred to follow up visit unless specifically addressed in assessment and plan.    Interpretation of PHQ-9 Total Score   Score Severity   1-4 No Depression   5-9 Mild Depression   10-14 Moderate Depression   15-19 Moderately Severe Depression   20-27 Severe Depression    Screening for Cognitive Impairment    Three Minute Recall (village, kitchen, baby) 3/3    Jeffrey clock face with all 12  numbers and set the hands to show 10 past 10.  Yes    Cognitive concerns identified deferred for follow up unless specifically addressed in assessment and plan.    Fall Risk Assessment    Has the patient had two or more falls in the last year or any fall with injury in the last year?  No    Safety Assessment    Throw rugs on floor.  No  Handrails on all stairs.  Yes  Good lighting in all hallways.  Yes  Difficulty hearing.  No  Patient counseled about all safety risks that were identified.    Functional Assessment ADLs    Are there any barriers preventing you from cooking for yourself or meeting nutritional needs?  No.    Are there any barriers preventing you from driving safely or obtaining transportation?  No.    Are there any barriers preventing you from using a telephone or calling for help?  No.    Are there any barriers preventing you from shopping?  No.    Are there any barriers preventing you from taking care of your own finances?  No.    Are there any barriers preventing you from managing your medications?  No.    Are there any barriers preventing you from showering, bathing or dressing yourself?  No.    Are you currently engaging in any exercise or physical activity?  Yes.     What is your perception of your health?  Excellent.      Health Maintenance Summary                IMM PNEUMOCOCCAL 65+ (ADULT) LOW/MEDIUM RISK SERIES Overdue 10/11/2018      Done 9/30/2015 Imm Admin: Pneumococcal Conjugate Vaccine (Prevnar/PCV-13)     Patient has more history with this topic...    COLON CANCER SCREENING ANNUAL FIT Next Due 4/18/2019      Done 4/18/2018 OCCULT BLOOD FECES IMMUNOASSAY    IMM ZOSTER VACCINES Postponed 10/13/2033 Originally 11/24/2016. Insurance/Financial     Done 9/29/2016 Imm Admin: Zoster Vaccine Live (ZVL) (Zostavax)    IMM HEP B VACCINE Postponed 10/12/2034 Originally 1/24/1969. Insurance/Financial    Annual Wellness Visit Next Due 4/6/2019      Done 4/5/2018 Visit Dx: Medicare annual wellness  visit, subsequent     Patient has more history with this topic...    RETINAL SCREENING Next Due 7/12/2019      Done 7/12/2018 REFERRAL FOR RETINAL SCREENING EXAM     Patient has more history with this topic...    A1C SCREENING Next Due 10/1/2019      Done 4/1/2019 HEMOGLOBIN A1C      Patient has more history with this topic...    DIABETES MONOFILAMENT / LE EXAM Next Due 10/9/2019      Done 10/9/2018 AMB DIABETIC MONOFILAMENT LOWER EXTREMITY EXAM     Patient has more history with this topic...    FASTING LIPID PROFILE Next Due 4/1/2020      Done 4/1/2019 LIPID PROFILE      Patient has more history with this topic...    URINE ACR / MICROALBUMIN Next Due 4/1/2020      Done 4/1/2019 MICROALBUMIN CREAT RATIO URINE     Patient has more history with this topic...    SERUM CREATININE Next Due 4/1/2020      Done 4/1/2019 COMP METABOLIC PANEL     Patient has more history with this topic...    IMM DTaP/Tdap/Td Vaccine Next Due 10/11/2022      Done 10/11/2012 Imm Admin: Tdap Vaccine          Patient Care Team:  Avila Coker M.D. as PCP - General  Courtney Noyola R.N. as Medical Home Care Manager  Andrew Gonzalez M.D. as Consulting Physician (Ophthalmology)      Patient Active Problem List    Diagnosis Date Noted   • Diabetic retinopathy (HCC) 05/30/2015     Priority: High   • Atherosclerosis of abdominal aorta (HCC) 04/04/2019   • Dyslipidemia 10/24/2017   • Controlled type 2 diabetes mellitus, without long-term current use of insulin (HCC) 04/06/2017   • Constipation 04/06/2017   • Cataract, diabetic (HCC) 04/02/2015   • Primary localized osteoarthrosis, pelvic region and thigh 10/21/2014   • HTN (hypertension), benign 03/22/2010       Current Outpatient Prescriptions   Medication Sig Dispense Refill   • atorvastatin (LIPITOR) 40 MG Tab Take 1 Tab by mouth every day. 90 Tab 3   • lisinopril (PRINIVIL, ZESTRIL) 40 MG tablet Take 1 Tab by mouth every day. 90 Tab 3   • hydroCHLOROthiazide (HYDRODIURIL) 25 MG Tab Take 1 Tab  "by mouth every day. 90 Tab 3   • metFORMIN (GLUCOPHAGE) 500 MG Tab Take 1 Tab by mouth 2 times a day, with meals. 180 Tab 3   • aspirin (ASA) 325 MG TABS Take 325 mg by mouth 2 Times a Day.     • Coenzyme Q10 (CO Q 10 PO) Take  by mouth 2 Times a Day.     • therapeutic multivitamin-minerals (THERAGRAN-M) TABS Take 1 Tab by mouth every morning.       No current facility-administered medications for this visit.          Allergies as of 04/05/2019   • (No Known Allergies)        ROS: Denies Chest pain, SOB, LE edema.    /64 (BP Location: Right arm, Patient Position: Sitting)   Pulse 83   Temp 36.2 °C (97.1 °F)   Ht 1.93 m (6' 4\")   Wt 106.6 kg (235 lb)   SpO2 94%   BMI 28.61 kg/m²     Physical Exam:  Gen:         Alert and oriented, No apparent distress.  Neck:        No Lymphadenopathy or Bruits.  Lungs:     Clear to auscultation bilaterally  CV:          Regular rate and rhythm. No murmurs, rubs or gallops.               Ext:          No clubbing, cyanosis, edema.      Assessment and Plan.   69 y.o. male with the following issues.    1. Medicare annual wellness visit, subsequent  Discussed healthy lifestyle habits as well as screening regimens.    2. Controlled type 2 diabetes mellitus with other specified complication, without long-term current use of insulin (HCC)  Discussion about diet exercise recheck 6 months  - metFORMIN (GLUCOPHAGE) 500 MG Tab; Take 1 Tab by mouth 2 times a day, with meals.  Dispense: 180 Tab; Refill: 3    3. Dyslipidemia  Lipids currently well controlled. Discussed continued diet and exercise recheck 6 months to 1 year.  Refilled medications  - atorvastatin (LIPITOR) 40 MG Tab; Take 1 Tab by mouth every day.  Dispense: 90 Tab; Refill: 3    4. HTN (hypertension), benign  Moderately well controlled again dietary changes he will monitor at home recheck 6 months have refilled medications  - lisinopril (PRINIVIL, ZESTRIL) 40 MG tablet; Take 1 Tab by mouth every day.  Dispense: 90 Tab; " Refill: 3  - hydroCHLOROthiazide (HYDRODIURIL) 25 MG Tab; Take 1 Tab by mouth every day.  Dispense: 90 Tab; Refill: 3    5. Mild nonproliferative diabetic retinopathy without macular edema associated with type 2 diabetes mellitus, unspecified laterality (HCC)  Follow with ophthalmology    6. Atherosclerosis of abdominal aorta (HCC)  Continue risk reduction.    7. Need for vaccination    - Pneumococal Polysaccharide Vaccine 23-Valent =>1YO SQ/IM    8. Screen for colon cancer    - OCCULT BLOOD FECES IMMUNOASSAY; Future

## 2019-04-22 ENCOUNTER — HOSPITAL ENCOUNTER (OUTPATIENT)
Facility: MEDICAL CENTER | Age: 69
End: 2019-04-22
Attending: INTERNAL MEDICINE
Payer: MEDICARE

## 2019-04-22 PROCEDURE — 82274 ASSAY TEST FOR BLOOD FECAL: CPT

## 2019-04-25 DIAGNOSIS — Z12.11 SCREEN FOR COLON CANCER: ICD-10-CM

## 2019-04-26 LAB — HEMOCCULT STL QL IA: NEGATIVE

## 2019-06-16 ENCOUNTER — OFFICE VISIT (OUTPATIENT)
Dept: URGENT CARE | Facility: CLINIC | Age: 69
End: 2019-06-16
Payer: MEDICARE

## 2019-06-16 VITALS
RESPIRATION RATE: 20 BRPM | DIASTOLIC BLOOD PRESSURE: 80 MMHG | BODY MASS INDEX: 26.79 KG/M2 | HEART RATE: 86 BPM | WEIGHT: 220 LBS | TEMPERATURE: 98.2 F | HEIGHT: 76 IN | OXYGEN SATURATION: 98 % | SYSTOLIC BLOOD PRESSURE: 128 MMHG

## 2019-06-16 DIAGNOSIS — M62.838 MUSCLE SPASM: ICD-10-CM

## 2019-06-16 DIAGNOSIS — M25.511 ACUTE PAIN OF RIGHT SHOULDER: ICD-10-CM

## 2019-06-16 DIAGNOSIS — M89.8X1 PAIN IN SCAPULA: ICD-10-CM

## 2019-06-16 DIAGNOSIS — M79.601 PAIN IN RIGHT ARM: ICD-10-CM

## 2019-06-16 PROCEDURE — 99214 OFFICE O/P EST MOD 30 MIN: CPT | Performed by: PHYSICIAN ASSISTANT

## 2019-06-16 PROCEDURE — 93000 ELECTROCARDIOGRAM COMPLETE: CPT | Performed by: PHYSICIAN ASSISTANT

## 2019-06-16 RX ORDER — METHYLPREDNISOLONE 4 MG/1
TABLET ORAL
Qty: 1 KIT | Refills: 0 | Status: SHIPPED | OUTPATIENT
Start: 2019-06-16 | End: 2019-07-03

## 2019-06-16 RX ORDER — BACLOFEN 10 MG/1
TABLET ORAL
Qty: 30 TAB | Refills: 0 | Status: SHIPPED | OUTPATIENT
Start: 2019-06-16 | End: 2019-09-10

## 2019-06-16 ASSESSMENT — ENCOUNTER SYMPTOMS
TINGLING: 1
SORE THROAT: 0
HEADACHES: 0
CHILLS: 0
MYALGIAS: 1
DEPRESSION: 0
BACK PAIN: 0
NECK PAIN: 0
MUSCLE WEAKNESS: 0
FALLS: 0
NUMBNESS: 1
DIZZINESS: 0
COUGH: 0
FEVER: 0

## 2019-06-16 NOTE — PROGRESS NOTES
Subjective:      Matthew Perez is a 69 y.o. male who presents with Shoulder Pain (COuple days right shoulder pain radiated to arm .)            Arm Pain    The incident occurred 5 to 7 days ago. Incident location: Occurred after driving in the car 1100 miles with right arm in flexed position for long periods of time. There was no injury mechanism. Pain location: Right scapula, right shoulder and right arm. The quality of the pain is described as aching. Radiates to: Right fifth finger. The pain is at a severity of 3/10. The pain is mild. The pain has been intermittent since the incident. Associated symptoms include numbness and tingling. Pertinent negatives include no chest pain or muscle weakness. Associated symptoms comments: Tingling in the fifth finger of the right hand. Nothing aggravates the symptoms. He has tried NSAIDs for the symptoms. The treatment provided mild relief.     Past medical history: Is not pertinent to this examination  Past surgical history: Not pertinent to this examination  Family history: Is not pertinent to this examination  Allergies: No known drug allergies  Social history: Is reviewed in [facility]  Current Outpatient Prescriptions on File Prior to Visit   Medication Sig Dispense Refill   • atorvastatin (LIPITOR) 40 MG Tab Take 1 Tab by mouth every day. 90 Tab 3   • lisinopril (PRINIVIL, ZESTRIL) 40 MG tablet Take 1 Tab by mouth every day. 90 Tab 3   • hydroCHLOROthiazide (HYDRODIURIL) 25 MG Tab Take 1 Tab by mouth every day. 90 Tab 3   • metFORMIN (GLUCOPHAGE) 500 MG Tab Take 1 Tab by mouth 2 times a day, with meals. 180 Tab 3   • aspirin (ASA) 325 MG TABS Take 325 mg by mouth 2 Times a Day.     • Coenzyme Q10 (CO Q 10 PO) Take  by mouth 2 Times a Day.     • therapeutic multivitamin-minerals (THERAGRAN-M) TABS Take 1 Tab by mouth every morning.       No current facility-administered medications on file prior to visit.        Review of Systems   Constitutional: Negative for chills  "and fever.   HENT: Negative for sore throat.    Respiratory: Negative for cough.    Cardiovascular: Negative for chest pain.   Genitourinary: Negative.    Musculoskeletal: Positive for myalgias. Negative for back pain, falls, joint pain and neck pain.   Skin: Positive for rash.   Neurological: Positive for tingling and numbness. Negative for dizziness and headaches.   Psychiatric/Behavioral: Negative for depression.   All other systems reviewed and are negative.         Objective:     /80   Pulse 86   Temp 36.8 °C (98.2 °F) (Temporal)   Resp 20   Ht 1.93 m (6' 4\")   Wt 99.8 kg (220 lb)   SpO2 98%   BMI 26.78 kg/m²      Physical Exam   Pulmonary/Chest:                  Gen.: Patient is A and O ×3, no acute distress, well-nourished well-hydrated  Vitals: Are listed and unremarkable  HEENT: Heads normocephalic, atraumatic, PERRLA, extraocular movements intact, TMs and oropharynx clear  Neck: Soft supple without cervical lymphadenopathy  Cardiovascular: Regular rate and rhythm normal S1 and S2. No murmurs, rubs or gallops  Lungs are clear to auscultation bilaterally. no wheezes rales or rhonchi  Abdomen is soft, nontender, nondistended with good bowel sounds, no hepatosplenomegaly  Skin: Is well perfused without evidence of rash or lesions  Neurological:  cranial nerves II through XII were assessed and intact.  Musculoskeletal: Full range of motion, 5 out of 5 strength against resistance bilateral upper extremities.  Patient has pain to palpation pinpoint in the inferior right scapular region.  This is reproducible  Neurovascularly: Intact with a 2 second cap refill, good distal pulses    Urgent care course: EKG was done and showed normal sinus rhythm with a ventricular rate of 54.  No abnormalities noted       Assessment/Plan:     1. Acute pain of right shoulder    - EKG - Clinic Performed  - baclofen (LIORESAL) 10 MG Tab; For the first 3 days take 5 mg up to 3 times a day, thereafter can take 10 mg p.o. " up to 3 times daily as needed for muscle spasm  Dispense: 30 Tab; Refill: 0  - methylPREDNISolone (MEDROL DOSEPAK) 4 MG Tablet Therapy Pack; Take daily according to directions on the packet  Dispense: 1 Kit; Refill: 0  - REFERRAL TO SPORTS MEDICINE    2. Pain in scapula    - EKG - Clinic Performed  - baclofen (LIORESAL) 10 MG Tab; For the first 3 days take 5 mg up to 3 times a day, thereafter can take 10 mg p.o. up to 3 times daily as needed for muscle spasm  Dispense: 30 Tab; Refill: 0  - methylPREDNISolone (MEDROL DOSEPAK) 4 MG Tablet Therapy Pack; Take daily according to directions on the packet  Dispense: 1 Kit; Refill: 0  - REFERRAL TO SPORTS MEDICINE    3. Muscle spasm    - EKG - Clinic Performed  - baclofen (LIORESAL) 10 MG Tab; For the first 3 days take 5 mg up to 3 times a day, thereafter can take 10 mg p.o. up to 3 times daily as needed for muscle spasm  Dispense: 30 Tab; Refill: 0  - methylPREDNISolone (MEDROL DOSEPAK) 4 MG Tablet Therapy Pack; Take daily according to directions on the packet  Dispense: 1 Kit; Refill: 0  - REFERRAL TO SPORTS MEDICINE    4. Pain in right arm    - EKG - Clinic Performed  - baclofen (LIORESAL) 10 MG Tab; For the first 3 days take 5 mg up to 3 times a day, thereafter can take 10 mg p.o. up to 3 times daily as needed for muscle spasm  Dispense: 30 Tab; Refill: 0  - methylPREDNISolone (MEDROL DOSEPAK) 4 MG Tablet Therapy Pack; Take daily according to directions on the packet  Dispense: 1 Kit; Refill: 0  - REFERRAL TO SPORTS MEDICINE      Differential includes muscle spasm versus cubital tunnel syndrome.  Discussed alternating ice and heat.  EKG done in clinic  We will put in referral to see our sports medicine specialist for further evaluation.  May need brace.  Patient had arm rested on the arm brace of a car for many miles.  This likely contributed to pinching the nerve, ulnar nerve,

## 2019-06-24 ENCOUNTER — OFFICE VISIT (OUTPATIENT)
Dept: MEDICAL GROUP | Facility: CLINIC | Age: 69
End: 2019-06-24
Payer: MEDICARE

## 2019-06-24 ENCOUNTER — APPOINTMENT (OUTPATIENT)
Dept: RADIOLOGY | Facility: IMAGING CENTER | Age: 69
End: 2019-06-24
Attending: FAMILY MEDICINE
Payer: MEDICARE

## 2019-06-24 VITALS
HEIGHT: 75 IN | WEIGHT: 225.4 LBS | TEMPERATURE: 97.9 F | SYSTOLIC BLOOD PRESSURE: 114 MMHG | DIASTOLIC BLOOD PRESSURE: 76 MMHG | HEART RATE: 70 BPM | RESPIRATION RATE: 16 BRPM | OXYGEN SATURATION: 96 % | BODY MASS INDEX: 28.03 KG/M2

## 2019-06-24 DIAGNOSIS — M50.323 OTHER CERVICAL DISC DEGENERATION AT C6-C7 LEVEL: ICD-10-CM

## 2019-06-24 DIAGNOSIS — M54.12 RADICULOPATHY, CERVICAL REGION: ICD-10-CM

## 2019-06-24 PROCEDURE — 99203 OFFICE O/P NEW LOW 30 MIN: CPT | Performed by: FAMILY MEDICINE

## 2019-06-24 PROCEDURE — 72040 X-RAY EXAM NECK SPINE 2-3 VW: CPT | Mod: TC | Performed by: FAMILY MEDICINE

## 2019-06-24 NOTE — PROGRESS NOTES
CHIEF COMPLAINT:  Matthew Perez male presenting at the request of Ruthie Handy PA-C  for evaluation of Shoulder pain.     Matthew Perez is complaining of right shoulder pain and arm pain  After long car ride, Wednesday, June 12, 2019  Pain is at the maria m-scapular  Quality is sharp  Pain is Radiating down RIGHT arm at the elbow with numbness and tingling in a RIGHT ulnar distribution  Aggravated by nighttime, sleeping and lying down  Improved with  movement and heat which help some, as well as relieving weight off of his shoulder by abducting the RIGHT shoulder  no prior problems with this shoulder in the past  Prior Treatments: steroid and muscle relaxant did not help  Prior studies: NO Prior imaging has been done   Medications tried for pain include: steroid at  and baclofen, aleve which helps some  Mechanical Symptom history: No Locking     Retired computer work  Walks daily, trips and computer stuff now melyssa he is retired    REVIEW OF SYSTEMS  No Vomiting, No Chest Pain, No Shortness of Breath, No Dizziness, No Headache, Nausea when the pain is severe    PAST MEDICAL HISTORY:   History reviewed. No pertinent past medical history.    PMH:  has a past medical history of CAD (coronary artery disease) (3/22/2010); CATARACT (3/28/2012); Cataract, diabetic (HCC) (4/2/2015); Dyslipidemia (3/22/2010); HTN (hypertension), benign (3/22/2010); MEDICAL HOME; and Type II or unspecified type diabetes mellitus without mention of complication, not stated as uncontrolled.  MEDS:   Current Outpatient Prescriptions:   •  atorvastatin (LIPITOR) 40 MG Tab, Take 1 Tab by mouth every day., Disp: 90 Tab, Rfl: 3  •  lisinopril (PRINIVIL, ZESTRIL) 40 MG tablet, Take 1 Tab by mouth every day., Disp: 90 Tab, Rfl: 3  •  hydroCHLOROthiazide (HYDRODIURIL) 25 MG Tab, Take 1 Tab by mouth every day., Disp: 90 Tab, Rfl: 3  •  metFORMIN (GLUCOPHAGE) 500 MG Tab, Take 1 Tab by mouth 2 times a day, with meals., Disp: 180 Tab, Rfl:  "3  •  aspirin (ASA) 325 MG TABS, Take 325 mg by mouth 2 Times a Day., Disp: , Rfl:   •  Coenzyme Q10 (CO Q 10 PO), Take  by mouth 2 Times a Day., Disp: , Rfl:   •  therapeutic multivitamin-minerals (THERAGRAN-M) TABS, Take 1 Tab by mouth every morning., Disp: , Rfl:   •  baclofen (LIORESAL) 10 MG Tab, For the first 3 days take 5 mg up to 3 times a day, thereafter can take 10 mg p.o. up to 3 times daily as needed for muscle spasm (Patient not taking: Reported on 6/24/2019), Disp: 30 Tab, Rfl: 0  •  methylPREDNISolone (MEDROL DOSEPAK) 4 MG Tablet Therapy Pack, Take daily according to directions on the packet (Patient not taking: Reported on 6/24/2019), Disp: 1 Kit, Rfl: 0  ALLERGIES: No Known Allergies  SURGHX:   Past Surgical History:   Procedure Laterality Date   • HIP ARTHROPLASTY TOTAL  1/20/2015    Performed by Ziyad Tang M.D. at SUR[facility]ERY [facility]   • HIP ARTHROPLASTY TOTAL  10/21/2014    Performed by Ziyad Tang M.D. at SUR[facility]ERY [facility]   • OTHER ORTHOPEDIC SURGERY  1990    right knee arthroscopy   • OTHER      TONSILLECTOMY     SOCHX:  reports that he quit smoking about 40 years ago. His smoking use included Cigarettes. He has a 12.00 pack-year smoking history. He has never used smokeless tobacco. He reports that he drinks about 1.8 oz of alcohol per week . He reports that he does not use drugs.  FH: Family history was reviewed, no pertinent findings to report     PHYSICAL EXAM:  /76 (BP Location: Left arm, Patient Position: Sitting, BP Cuff Size: Large adult)   Pulse 70   Temp 36.6 °C (97.9 °F) (Temporal)   Resp 16   Ht 1.905 m (6' 3\")   Wt 102.2 kg (225 lb 6.4 oz)   SpO2 96%   BMI 28.17 kg/m²      well-developed, well-nourished in no apparent distress, alert and oriented x 3.  Gait: normal    Cervical spine:  Range of motion Markedly limited with Extension and Markedllimited with Lateral rotation  Spurling's testing is POSITIVE with pain radiating into the shoulder and " maria m-scapular region  Cervical spine tenderness NEGATIVE    Strength testing:     Deltoid, bilateral 5/5  Bicep, bilateral 5/5  Tricep, bilateral 5/5  Wrist Extension, bilateral 5/5  Wrist Flexion, bilateral 5/5  Finger Abduction, on the right 3/5 and left 5/5  Sensation:  INTACT Bilaterally    Reflexes:   Biceps: R 2+/L 2+  Triceps: R 2+/L  2+  Brachial radialis R 2+/L  2+  Jordan's testing is NEGATIVE  The arms are otherwise neurovascularly intact     Shoulder Exam:    RIGHT Shoulder:  No visible swelling   Range of motion INTACT  Tenderness: Non-tender  Empty Can Testing 5/5  Internal Rotation 5/5  External Rotation 5/5  Lift Off Testing 5/5  Impingement testing Humphrey  NEGATIVE  Neer's testing NEGATIVE  Apprehension testing NEGATIVE     LEFT Shoulder:  No visible swelling   Range of motion INTACT  Tenderness: Non-tender  Empty Can Testing 5/5  Internal Rotation 5/5  External Rotation 5/5  Lift Off Testing 5/5  Impingement testing Humphrey  NEGATIVE  Neer's testing NEGATIVE  Apprehension testing NEGATIVE    Additional Findings: Flexed Posture      1. Radiculopathy, cervical region (RIGHT)  DX-CERVICAL SPINE-2 OR 3 VIEWS    MR-CERVICAL SPINE-W/O    REFERRAL TO PHYSICAL THERAPY Reason for Therapy: Eval/Treat/Report   2. Other cervical disc degeneration at C6-C7 level  MR-CERVICAL SPINE-W/O    REFERRAL TO PHYSICAL THERAPY Reason for Therapy: Eval/Treat/Report     RIGHT radiculopathy in a C8 distribution with POSITIVE significant weakness with finger abduction of the RIGHT compared to the left.  Acute lower cervical herniation  Symptoms began insidiously after long car ride and he was sitting in a awkward position (tension of his cervical spine to the RIGHT side    Given his acute onset of symptoms together with significant weakness on examination as well as degenerative changes noted on cervical spine x-ray, recommend MRI study of the cervical spine for evaluation of possible disc herniation at the level of C7-T1  in the RIGHT    [facility]  MRI c-spine to evaluate C7-T1    Return in about 1 week (around 7/1/2019).  To discuss cervical spine MRI results        6/24/2019 11:24 AM    HISTORY/REASON FOR EXAM:  Right shoulder pain for 12 days.      TECHNIQUE/EXAM DESCRIPTION AND NUMBER OF VIEWS:  Cervical spine series, 3 views.    COMPARISON:  None.      FINDINGS:  The alignment of the cervical spine is within normal limits.    The vertebral body heights are maintained.    There is degenerative endplate spurring with disc space narrowing at the C5-6 and C6-7 levels. There is facet arthropathy in the lower cervical spine with scattered uncovertebral joint arthropathy in the mid and lower cervical spine.    There is no focal prevertebral soft tissue swelling. Visualized lung apices are clear. There is atherosclerosis of the aortic arch.   Impression       1.  There is no acute fracture or malalignment of the cervical spine.  2.  There is degenerative disc disease and arthropathy as noted above.     taken here and reviewed by me    Thank you Ruthie Handy PA-C for allowing me to participate in caring for your patient.

## 2019-06-25 ENCOUNTER — APPOINTMENT (OUTPATIENT)
Dept: RADIOLOGY | Facility: MEDICAL CENTER | Age: 69
End: 2019-06-25
Attending: FAMILY MEDICINE
Payer: MEDICARE

## 2019-06-25 DIAGNOSIS — M50.323 OTHER CERVICAL DISC DEGENERATION AT C6-C7 LEVEL: ICD-10-CM

## 2019-06-25 DIAGNOSIS — M54.12 RADICULOPATHY, CERVICAL REGION: ICD-10-CM

## 2019-06-25 PROCEDURE — 72141 MRI NECK SPINE W/O DYE: CPT

## 2019-06-27 ENCOUNTER — OFFICE VISIT (OUTPATIENT)
Dept: MEDICAL GROUP | Facility: CLINIC | Age: 69
End: 2019-06-27
Payer: MEDICARE

## 2019-06-27 VITALS — BODY MASS INDEX: 28.02 KG/M2 | WEIGHT: 225.38 LBS | HEIGHT: 75 IN

## 2019-06-27 DIAGNOSIS — M48.02 FORAMINAL STENOSIS OF CERVICAL REGION: ICD-10-CM

## 2019-06-27 DIAGNOSIS — R29.898 RIGHT ARM WEAKNESS: ICD-10-CM

## 2019-06-27 DIAGNOSIS — M54.12 RADICULOPATHY, CERVICAL REGION: ICD-10-CM

## 2019-06-27 DIAGNOSIS — E11.69 CONTROLLED TYPE 2 DIABETES MELLITUS WITH OTHER SPECIFIED COMPLICATION, WITHOUT LONG-TERM CURRENT USE OF INSULIN (HCC): ICD-10-CM

## 2019-06-27 DIAGNOSIS — E78.5 DYSLIPIDEMIA: ICD-10-CM

## 2019-06-27 PROCEDURE — 99214 OFFICE O/P EST MOD 30 MIN: CPT | Performed by: FAMILY MEDICINE

## 2019-06-27 RX ORDER — ATORVASTATIN CALCIUM 40 MG/1
40 TABLET, FILM COATED ORAL DAILY
Qty: 90 TAB | Refills: 3 | Status: SHIPPED | OUTPATIENT
Start: 2019-06-27 | End: 2019-06-28 | Stop reason: SDUPTHER

## 2019-06-27 RX ORDER — GABAPENTIN 300 MG/1
300 CAPSULE ORAL
Qty: 30 CAP | Refills: 1 | Status: SHIPPED | OUTPATIENT
Start: 2019-06-27 | End: 2019-08-26

## 2019-06-27 NOTE — PROGRESS NOTES
1. Foraminal stenosis of cervical region  REFERRAL TO NEUROSURGERY   2. Right arm weakness  REFERRAL TO NEUROSURGERY   3. Radiculopathy, cervical region (RIGHT)  REFERRAL TO NEUROSURGERY    REFERRAL TO PHYSIATRY (PMR)    gabapentin (NEURONTIN) 300 MG Cap     Patient is having RIGHT upper extremity weakness together with radiculopathy and severe stenosis noted on MRI  We discussed the option of surgical evaluation, particularly if symptoms progress  Also, will refer to physiatry for consideration of EVANS to help his radiculopathy    For now, we will start gabapentin before bedtime to see if this helps ease his radicular symptoms  We discussed the risks of starting gabapentin including drowsiness      6/25/2019 4:12 PM    HISTORY/REASON FOR EXAM: Neck pain and shoulder pain      TECHNIQUE/EXAM DESCRIPTION:  MRI of the cervical spine without contrast.    The study was performed on a Siemens 3.0 Inez MRI scanner.  T1 sagittal, T2 fast spin-echo sagittal, and gradient echo axial images were obtained of the cervical spine. T2 fat suppressed sagittal images were also obtained. Optional T2 axial images may also be included.    COMPARISON: None.    FINDINGS:  Alignment in the cervical spine is normal. Marrow signal in the vertebral bodies is within normal limits. The disc spaces are moderately narrowed at C5-6 and C6-7 levels. There are mild marginal osteophytic changes. The prevertebral and paraspinous soft   tissues are unremarkable.    There are no anomalies at the craniovertebral junction. The cervical spinal cord is normal in caliber and signal throughout its course.    Level specific findings:    C2-3 level normal.    C3-4 level minimal posterior spurring. Severe right-sided neural foraminal narrowing and moderate left-sided neural foraminal narrowing.    C4-5 level mild broad-based posterior spurring. Severe right-sided neural foraminal narrowing and moderate left-sided neural foraminal narrowing.    C5-6 level mild  posterior spurring. Severe right-sided neural foraminal narrowing and moderate left-sided neural foraminal narrowing.    C6-7 level mild posterior spurring. Moderate right-sided neural foraminal narrowing.    C7-T1 level mild posterior spurring.   Impression         1.  Minimal to mild multilevel cervical spondylotic change.    2.  Severe multilevel right-sided neural foraminal narrowing.     Interpreted in the office today with the patient

## 2019-06-28 DIAGNOSIS — E11.69 CONTROLLED TYPE 2 DIABETES MELLITUS WITH OTHER SPECIFIED COMPLICATION, WITHOUT LONG-TERM CURRENT USE OF INSULIN (HCC): ICD-10-CM

## 2019-06-28 DIAGNOSIS — E78.5 DYSLIPIDEMIA: ICD-10-CM

## 2019-06-28 RX ORDER — ATORVASTATIN CALCIUM 40 MG/1
40 TABLET, FILM COATED ORAL DAILY
Qty: 100 TAB | Refills: 3 | Status: SHIPPED | OUTPATIENT
Start: 2019-06-28 | End: 2020-07-22 | Stop reason: SDUPTHER

## 2019-07-01 ENCOUNTER — HOSPITAL ENCOUNTER (OUTPATIENT)
Dept: RADIOLOGY | Facility: MEDICAL CENTER | Age: 69
End: 2019-07-01
Attending: PHYSICIAN ASSISTANT
Payer: MEDICARE

## 2019-07-01 DIAGNOSIS — M54.13 RADICULOPATHY OF CERVICOTHORACIC REGION: ICD-10-CM

## 2019-07-01 PROCEDURE — 71046 X-RAY EXAM CHEST 2 VIEWS: CPT

## 2019-07-02 ENCOUNTER — OFFICE VISIT (OUTPATIENT)
Dept: PHYSICAL MEDICINE AND REHAB | Facility: MEDICAL CENTER | Age: 69
End: 2019-07-02
Payer: MEDICARE

## 2019-07-02 VITALS
TEMPERATURE: 98.1 F | OXYGEN SATURATION: 97 % | HEART RATE: 77 BPM | BODY MASS INDEX: 27.25 KG/M2 | WEIGHT: 223.77 LBS | HEIGHT: 76 IN | DIASTOLIC BLOOD PRESSURE: 72 MMHG | SYSTOLIC BLOOD PRESSURE: 116 MMHG

## 2019-07-02 DIAGNOSIS — M54.12 CERVICAL RADICULOPATHY: ICD-10-CM

## 2019-07-02 DIAGNOSIS — M47.22 OSTEOARTHRITIS OF SPINE WITH RADICULOPATHY, CERVICAL REGION: ICD-10-CM

## 2019-07-02 DIAGNOSIS — M79.10 MYALGIA: ICD-10-CM

## 2019-07-02 DIAGNOSIS — R29.898 RIGHT HAND WEAKNESS: ICD-10-CM

## 2019-07-02 DIAGNOSIS — G56.21 ULNAR NEUROPATHY AT ELBOW, RIGHT: ICD-10-CM

## 2019-07-02 DIAGNOSIS — R29.898 RIGHT ARM WEAKNESS: ICD-10-CM

## 2019-07-02 PROCEDURE — 99205 OFFICE O/P NEW HI 60 MIN: CPT | Performed by: PHYSICAL MEDICINE & REHABILITATION

## 2019-07-02 ASSESSMENT — PATIENT HEALTH QUESTIONNAIRE - PHQ9: CLINICAL INTERPRETATION OF PHQ2 SCORE: 0

## 2019-07-02 ASSESSMENT — PAIN SCALES - GENERAL: PAINLEVEL: 9=SEVERE PAIN

## 2019-07-02 NOTE — PROGRESS NOTES
New patient note    Physiatry (physical medicine and  Rehabilitation), interventional spine and sports medicine    Date of Service: 7/2/2019    Chief complaint:   Chief Complaint   Patient presents with   • New Patient     Radiculopathy, cervical region        HISTORY    HPI: Matthew Perez 69 y.o. male who presents today with Diagnoses of Right arm weakness, Cervical radiculopathy, Myalgia, Right hand weakness, Ulnar neuropathy at elbow, right, and Osteoarthritis of spine with radiculopathy, cervical region were pertinent to this visit.    Acute right shoulder pain 9 out of 10 in the morning, 5 out of 10, constant, aching worse in the right posterior and right shoulder and associated weakness and numbness in the right hand.  Patient has been to physical therapy with no significant improvement.       Medical records review:  I reviewed the note from the referring provider Maxim Keenan M.D. dated I also reviewed the notes from Dr. Maxim Keenan from 6/27/May 19, 1961 24/2019.  Cervical spine x-ray was ordered as well as an MRI.  The patient was sent to physiatry and prescribed gabapentin.      ROS:   Red Flags ROS:   Fever, Chills, Sweats: Denies  Involuntary Weight Loss: Denies  Bladder Incontinence: Denies  Bowel Incontinence: denies  Saddle Anesthesia: Denies    All other systems reviewed and negative.       PMHx:   Past Medical History:   Diagnosis Date   • CAD (coronary artery disease) 3/22/2010   • CATARACT 3/28/2012   • Cataract, diabetic (HCC) 4/2/2015   • Dyslipidemia 3/22/2010   • HTN (hypertension), benign 3/22/2010   • MEDICAL HOME    • Type II or unspecified type diabetes mellitus without mention of complication, not stated as uncontrolled     oral meds         Current Outpatient Prescriptions on File Prior to Visit   Medication Sig Dispense Refill   • metFORMIN (GLUCOPHAGE) 500 MG Tab Take 1 Tab by mouth 2 times a day, with meals. 200 Tab 3   • atorvastatin (LIPITOR) 40 MG Tab Take 1 Tab by  mouth every day. 100 Tab 3   • gabapentin (NEURONTIN) 300 MG Cap Take 1 Cap by mouth every bedtime for 60 days. Start 1 PO at HS daily, may increase to 2 at HS or BID if desired effect not reached 30 Cap 1   • lisinopril (PRINIVIL, ZESTRIL) 40 MG tablet Take 1 Tab by mouth every day. 90 Tab 3   • hydroCHLOROthiazide (HYDRODIURIL) 25 MG Tab Take 1 Tab by mouth every day. 90 Tab 3   • aspirin (ASA) 325 MG TABS Take 325 mg by mouth 2 Times a Day.     • Coenzyme Q10 (CO Q 10 PO) Take  by mouth 2 Times a Day.     • therapeutic multivitamin-minerals (THERAGRAN-M) TABS Take 1 Tab by mouth every morning.     • baclofen (LIORESAL) 10 MG Tab For the first 3 days take 5 mg up to 3 times a day, thereafter can take 10 mg p.o. up to 3 times daily as needed for muscle spasm (Patient not taking: Reported on 6/24/2019) 30 Tab 0   • methylPREDNISolone (MEDROL DOSEPAK) 4 MG Tablet Therapy Pack Take daily according to directions on the packet (Patient not taking: Reported on 6/24/2019) 1 Kit 0     No current facility-administered medications on file prior to visit.         PSHx:   Past Surgical History:   Procedure Laterality Date   • HIP ARTHROPLASTY TOTAL  1/20/2015    Performed by Ziyad Tang M.D. at SURGERY [facility]   • HIP ARTHROPLASTY TOTAL  10/21/2014    Performed by Ziyad Tang M.D. at SURGERY [facility]   • OTHER ORTHOPEDIC SURGERY  1990    right knee arthroscopy   • OTHER      TONSILLECTOMY       Family history   Family History   Problem Relation Age of Onset   • Cancer Father    • Cancer Brother          Medications: reviewed on [facility].   Outpatient Prescriptions Marked as Taking for the 7/2/19 encounter (Office Visit) with Junior Mcclain M.D.   Medication Sig Dispense Refill   • metFORMIN (GLUCOPHAGE) 500 MG Tab Take 1 Tab by mouth 2 times a day, with meals. 200 Tab 3   • atorvastatin (LIPITOR) 40 MG Tab Take 1 Tab by mouth every day. 100 Tab 3   • gabapentin (NEURONTIN) 300 MG Cap Take 1 Cap by  "mouth every bedtime for 60 days. Start 1 PO at HS daily, may increase to 2 at HS or BID if desired effect not reached 30 Cap 1   • lisinopril (PRINIVIL, ZESTRIL) 40 MG tablet Take 1 Tab by mouth every day. 90 Tab 3   • hydroCHLOROthiazide (HYDRODIURIL) 25 MG Tab Take 1 Tab by mouth every day. 90 Tab 3   • aspirin (ASA) 325 MG TABS Take 325 mg by mouth 2 Times a Day.     • Coenzyme Q10 (CO Q 10 PO) Take  by mouth 2 Times a Day.     • therapeutic multivitamin-minerals (THERAGRAN-M) TABS Take 1 Tab by mouth every morning.          Allergies:   No Known Allergies    Social Hx:   Social History     Social History   • Marital status:      Spouse name: N/A   • Number of children: N/A   • Years of education: N/A     Occupational History   • Not on file.     Social History Main Topics   • Smoking status: Former Smoker     Packs/day: 1.00     Years: 12.00     Types: Cigarettes     Quit date: 1/1/1979   • Smokeless tobacco: Never Used   • Alcohol use 1.8 oz/week     3 Glasses of wine per week      Comment: 3 per week   • Drug use: No   • Sexual activity: Yes     Partners: Female     Other Topics Concern   •  Service Yes   • Blood Transfusions No   • Caffeine Concern No   • Occupational Exposure No   • Hobby Hazards No   • Sleep Concern No   • Stress Concern No   • Weight Concern No   • Special Diet No   • Back Care No   • Exercise Yes   • Bike Helmet No   • Seat Belt Yes   • Self-Exams Yes     Social History Narrative   • No narrative on file         EXAMINATION     Physical Exam:   Vitals: /72 (BP Location: Left arm, Patient Position: Sitting, BP Cuff Size: Adult)   Pulse 77   Temp 36.7 °C (98.1 °F) (Temporal)   Ht 1.93 m (6' 4\")   Wt 101.5 kg (223 lb 12.3 oz)   SpO2 97%     Constitutional:   Body Habitus: Body mass index is 27.24 kg/m².  Cooperation: Fully cooperates with exam  Appearance: Well-groomed, well-nourished, not disheveled.  The patient was sitting with his right arm above his head which " she states makes his shoulder and arm pain better.    Eyes: No scleral icterus to suggest severe liver disease, no proptosis to suggest severe hyperthyroid    ENT -no obvious auditory deficits, no obvious tongue lesions, tongue midline, no facial droop     Skin -no rashes or lesions noted     Respiratory-  breathing comfortable on room air, no audible wheezing    Cardiovascular- capillary refills less than 2 seconds. No lower extremity edema is noted.     Gastrointestinal - no obvious abdominal masses, No tenderness to palpation in the abdomen    Psychiatric- alert and oriented ×3. Normal affect.     Gait - normal gait, no use of ambulatory device, nonantalgic.     Musculoskeletal -   Cervical spine   Inspection: No deformities of the skin over the cervical spine. No rashes or lesions.    full  A/P ROM in all directions, without  pain      Spurling’s sign: negative bilaterally    No signs of muscular atrophy in bilateral upper extremities     No tenderness to palpation of the cervical facets    right Shoulder  Inspection: No rashes are noted on the bilateral shoulders. Humerus is not grossly high riding when comparing the right and left sides.  Palpation: Positive trigger points in the rhomboids, trapezius.  No tenderness to palpation over the biceps tendon, acromioclavicular joint, scapular spine, supraspinous, infraspinatus, greater tuberosity, lesser tuberosity, trapezius.  Range of motion: Active range of motion forward ark and lateral arc within normal limits. The patient is able to lower his arm slowly with no drop arm.  Special tests:  Neers: negative  Humphrey: negative  Speeds: Negative  Jürgensen signs: Negative  Empty can: negative  O'Briens test: Negative  Crossarm test: Negative  Resisted internal rotation: Negative  Resisted external rotation: neagtive  Resisted elbow extension: Negative         Neuro       Key points for the international standards for neurological classification of spinal cord injury  (ISNCSCI) to light touch.     Dermatome R L   C4 2 2   C5 2 2   C6 2 2   C7 2 2   C8 1 2   T1 1 2   T2 2 2   L2 2 2   L3 2 2   L4 2 2   L5 2 2   S1 2 2   S2 2 2           Motor Exam Upper Extremities   ? Myotome R L   Shoulder flexion C5 4 5   Elbow flexion C5 4 5   Wrist extension C6 4 5   Elbow extension C7 4 5   Finger flexion C8 5 5   Finger abduction T1 2 5         Motor Exam Lower Extremities    ? Myotome R L   Hip flexion L2 5 5   Knee extension L3 5 5   Ankle dorsiflexion L4 5 5   Toe extension L5 5 5   Ankle plantarflexion S1 5 5       Jordan’s sign negative bilaterally       MEDICAL DECISION MAKING    Medical records review: see under HPI section.     DATA    Labs:   Lab Results   Component Value Date/Time    SODIUM 136 04/01/2019 09:26 AM    POTASSIUM 3.9 04/01/2019 09:26 AM    CHLORIDE 99 04/01/2019 09:26 AM    CO2 30 04/01/2019 09:26 AM    ANION 7.0 04/01/2019 09:26 AM    GLUCOSE 96 04/01/2019 09:26 AM    BUN 14 04/01/2019 09:26 AM    CREATININE 0.83 04/01/2019 09:26 AM    CREATININE 1.0 09/02/2008 08:37 AM    CALCIUM 9.3 04/01/2019 09:26 AM    ASTSGOT 21 04/01/2019 09:26 AM    ALTSGPT 26 04/01/2019 09:26 AM    TBILIRUBIN 0.6 04/01/2019 09:26 AM    ALBUMIN 4.1 04/01/2019 09:26 AM    TOTPROTEIN 6.5 04/01/2019 09:26 AM    GLOBULIN 2.4 04/01/2019 09:26 AM    AGRATIO 1.7 04/01/2019 09:26 AM   ]    No results found for: PROTHROMBTM, INR     Lab Results   Component Value Date/Time    WBC 7.8 01/22/2015 01:49 AM    RBC 3.81 (L) 01/22/2015 01:49 AM    HEMOGLOBIN 11.9 (L) 01/22/2015 01:49 AM    HEMATOCRIT 34.4 (L) 01/22/2015 01:49 AM    MCV 90.3 01/22/2015 01:49 AM    MCH 31.2 01/22/2015 01:49 AM    MCHC 34.6 01/22/2015 01:49 AM    MPV 11.6 01/22/2015 01:49 AM    NEUTSPOLYS 53.9 01/13/2015 12:13 PM    LYMPHOCYTES 28.8 01/13/2015 12:13 PM    MONOCYTES 12.1 01/13/2015 12:13 PM    EOSINOPHILS 3.4 01/13/2015 12:13 PM    BASOPHILS 1.4 01/13/2015 12:13 PM        Lab Results   Component Value Date/Time    HBA1C 6.6  (H) 04/01/2019 09:26 AM        Imaging:   I personally reviewed following images, these are my reads  MRI cervical spine 6/25/2019  Cervical spondylosis worse on the right side C3-4, C4-5, C5-6.  There is severe neuroforaminal stenosis on the right C4-5 and C5-6.  No significant central canal stenosis at any level.    IMAGING radiology reads. I reviewed the following radiology reads                                   Results for orders placed in visit on 06/25/19   MR-CERVICAL SPINE-W/O    Impression 1.  Minimal to mild multilevel cervical spondylotic change.    2.  Severe multilevel right-sided neural foraminal narrowing.                                                                                      Results for orders placed in visit on 06/24/19   DX-CERVICAL SPINE-2 OR 3 VIEWS    Impression 1.  There is no acute fracture or malalignment of the cervical spine.  2.  There is degenerative disc disease and arthropathy as noted above.          Results for orders placed during the hospital encounter of 07/01/19   DX-CHEST-2 VIEWS    Impression No acute cardiopulmonary findings.                        Results for orders placed in visit on 08/22/14   DX-HAND 3+    Impression No acute fracture is identified.          Results for orders placed during the hospital encounter of 01/20/15   DX-HIP-COMPLETE - UNILATERAL 2+    Impression Left hip replacement surgery.                                                  Results for orders placed in visit on 08/22/14   DX-WRIST-COMPLETE 3+    Impression Soft tissue swelling with no acute fracture identified. Tiny fragment old or possibly accessory ossification posterior to the carpal bones.           Diagnosis   Visit Diagnoses     ICD-10-CM   1. Right arm weakness R29.898   2. Cervical radiculopathy M54.12   3. Myalgia M79.10   4. Right hand weakness R29.898   5. Ulnar neuropathy at elbow, right G56.21   6. Osteoarthritis of spine with radiculopathy, cervical region M47.22            ASSESSMENT AND PLAN:  Matthew Perez 69 y.o. male      Matthew was seen today for new patient.    Diagnoses and all orders for this visit:    Right arm weakness  -     REFERRAL TO EMG - PHYSIATRY (PMR)    Cervical radiculopathy  -     REFERRAL TO EMG - PHYSIATRY (PMR)    Myalgia  -     REFERRAL TO PHYSIATRY (PMR)    Right hand weakness  -     REFERRAL TO EMG - PHYSIATRY (PMR)    Ulnar neuropathy at elbow, right  -     REFERRAL TO EMG - PHYSIATRY (PMR)    Osteoarthritis of spine with radiculopathy, cervical region  -     REFERRAL TO EMG - PHYSIATRY (PMR)      Patient prefers to sit with his right arm above his head which may indicate a cervical radiculopathy.  This is consistent with the patient's imaging with severe neuroforaminal stenosis at multiple levels with no significant central canal stenosis.  He also has weakness in C5, C6 and C7 myotomes.  He has significant weakness in finger abduction and Tinel's at the elbow consistent with ulnar neuropathy.  I believe the patient has superimposed ulnar neuropathy and cervical radiculopathy.  He also has significant trigger points around the shoulder however there is no significant shoulder pathology.  I scheduled the patient for ultrasound-guided trigger point injections as well as for an EMG of the right upper extremity.  Should The patient failed trigger point injections and I will consider him for cervical epidural.        Follow-up: 7/3/2019       Please note that this dictation was created using voice recognition software. I have made every reasonable attempt to correct obvious errors but there may be errors of grammar and content that I may have overlooked prior to finalization of this note.      Junior Mcclain MD  Physical Medicine and Rehabilitation  Interventional Spine and Sports Physiatry  [facility]               Maxim Morse M.D.   PEE Coker M.D.

## 2019-07-02 NOTE — Clinical Note
Dr May seems convinced that the patient does not have a radiculopathy. We will see after the EMG. In the meantime I set him up for ultrasound guided trigger point injections and for the EMG

## 2019-07-03 ENCOUNTER — OFFICE VISIT (OUTPATIENT)
Dept: PHYSICAL MEDICINE AND REHAB | Facility: MEDICAL CENTER | Age: 69
End: 2019-07-03
Payer: MEDICARE

## 2019-07-03 VITALS
SYSTOLIC BLOOD PRESSURE: 130 MMHG | HEIGHT: 76 IN | WEIGHT: 220.68 LBS | OXYGEN SATURATION: 98 % | HEART RATE: 98 BPM | DIASTOLIC BLOOD PRESSURE: 72 MMHG | TEMPERATURE: 97.5 F | BODY MASS INDEX: 26.87 KG/M2

## 2019-07-03 DIAGNOSIS — M79.10 MYALGIA: ICD-10-CM

## 2019-07-03 DIAGNOSIS — M47.22 OSTEOARTHRITIS OF SPINE WITH RADICULOPATHY, CERVICAL REGION: ICD-10-CM

## 2019-07-03 DIAGNOSIS — R29.898 RIGHT ARM WEAKNESS: ICD-10-CM

## 2019-07-03 DIAGNOSIS — R29.898 RIGHT HAND WEAKNESS: ICD-10-CM

## 2019-07-03 DIAGNOSIS — I10 HTN (HYPERTENSION), BENIGN: Chronic | ICD-10-CM

## 2019-07-03 DIAGNOSIS — M54.12 CERVICAL RADICULOPATHY: ICD-10-CM

## 2019-07-03 DIAGNOSIS — G56.21 ULNAR NEUROPATHY AT ELBOW, RIGHT: ICD-10-CM

## 2019-07-03 PROCEDURE — 76942 ECHO GUIDE FOR BIOPSY: CPT | Mod: 59,GZ | Performed by: PHYSICAL MEDICINE & REHABILITATION

## 2019-07-03 PROCEDURE — 20553 NJX 1/MLT TRIGGER POINTS 3/>: CPT | Performed by: PHYSICAL MEDICINE & REHABILITATION

## 2019-07-03 PROCEDURE — 76882 US LMTD JT/FCL EVL NVASC XTR: CPT | Mod: RT | Performed by: PHYSICAL MEDICINE & REHABILITATION

## 2019-07-03 PROCEDURE — 99214 OFFICE O/P EST MOD 30 MIN: CPT | Mod: 25 | Performed by: PHYSICAL MEDICINE & REHABILITATION

## 2019-07-03 RX ORDER — DEXAMETHASONE SODIUM PHOSPHATE 4 MG/ML
4 INJECTION, SOLUTION INTRA-ARTICULAR; INTRALESIONAL; INTRAMUSCULAR; INTRAVENOUS; SOFT TISSUE ONCE
Status: COMPLETED | OUTPATIENT
Start: 2019-07-03 | End: 2019-07-03

## 2019-07-03 RX ORDER — LISINOPRIL 40 MG/1
40 TABLET ORAL DAILY
Qty: 100 TAB | Refills: 3 | Status: SHIPPED | OUTPATIENT
Start: 2019-07-03 | End: 2020-07-22 | Stop reason: SDUPTHER

## 2019-07-03 RX ADMIN — DEXAMETHASONE SODIUM PHOSPHATE 4 MG: 4 INJECTION, SOLUTION INTRA-ARTICULAR; INTRALESIONAL; INTRAMUSCULAR; INTRAVENOUS; SOFT TISSUE at 11:26

## 2019-07-03 ASSESSMENT — PAIN SCALES - GENERAL: PAINLEVEL: 8=MODERATE-SEVERE PAIN

## 2019-07-03 NOTE — PATIENT INSTRUCTIONS
Get heelbo pad        Your procedure will be at the [facility] special procedure suite.    [facility] [place] [address]       PRE-PROCEDURE INSTRUCTIONS  You may take your regular medications except:   · No Anti-inflammatories 5 days prior to your procedure. Anti-inflammatories include medicines such as  ibuprofen (Motrin, Advil), Excedrin, Naproxen (Aleve, Anaprox, Naprelan, Naprosyn), Celecoxib (Celebrex), Diclofenac (Voltaren-XR tab), and Meloxicam (Mobic).   · No Glucophage or Metformin 24 hours before your procedure. You may resume next day after your procedure.  · Call the physiatry office if you are taking or prescribed anti-biotics within five days of procedure.  · Please ask provider if you are taking any new diabetes medication.  · CONTINUE TAKING BLOOD PRESSURE MEDICATIONS AS PRESCRIBED.  · Pain medications will not be prescribed on the procedure day. Procedural pain medication may be used by your provider   · Call your doctor's office performing the procedure if you have a fever, chills, rash or new illness prior to your procedure    Anticoagulation/antiplatelet medications  No Blood thinning medications such as Coumadin or Plavix 5 days prior to procedure unless your doctor said to continue these medications. Call your doctor if a new medication is prescribed in this class.     Restrictions for eating before procedure:   · If you are getting procedural sedation, then do not eat to for 8 hours prior to procedure appointment time. Do not drink fluids for four hours prior to your procedure time.   · If you are not having procedural sedation, then Skip the meal prior to your procedure. If you have a morning procedure then skip breakfast. If you have an afternoon procedure then skip lunch.   · You may drink clear liquids up to 2 hours prior to your procedure  · You must have a  the day of procedure to accompany you home.      POST PROCEDURE INSTRUCTIONS   · No heavy lifting,  strenuous bending or strenuous exercise for 3 days after your procedure.  · No hot tubs, baths, swimming for 3 days after your procedure  · You can remove the bandage the day after the procedure.  · IF YOU RECEIVED A STEROID INJECTION. PLEASE NOTE THAT THERE MAY BE A DELAY FOR THE INJECTION TO START WORKING, THE DELAY MAY BE UP TO TWO WEEKS. IF YOU HAVE DIABETES, PLEASE NOTE THAT YOUR SUGAR LEVELS MAY BE ELEVATED FOR 1-2 DAYS AFTER A STEROID INJECTION.  · IF YOU EXPERIENCE PROLONGED WEAKNESS LONGER THAN ONE DAY, BOWEL OR BLADDER INCONTINENCE THEN PLEASE CALL THE PHYSIATRY OFFICE.  · Your leg may feel heavy, weak and numb for up to 1-2 days. Be very careful walking.   ·  You may resume normal activities 3 days after procedure.

## 2019-07-03 NOTE — PROCEDURES
Date of Service: 7/3/2019     Physician/s: Junior Mcclain MD    Pre-operative Diagnosis: Myalgia (M79.1)    Post-operative Diagnosis: Myalgia (M79.1)    Procedure: Right trapezius muscle, right rhomboid, right thoracic paraspinous muscle trigger point injections    Description of procedure:    The risks, benefits, and alternatives of the procedure were reviewed and discussed with the patient.  Written informed consent was freely obtained. A pre-procedural time-out was conducted by the physician verifying patient’s identity, procedure to be performed, procedure site and side, and allergy verification. Appropriate equipment was determined to be in place for the procedure.     In the office suite exam room the patient was placed in a prone position and the skin areas for injection over the Right trapezius muscle, right rhomboid, right thoracic paraspinous muscle was marked. A solution was prepared with 1 mL of 4 mg/mL of dexamethasone, 3 mL of 1% lidocaine and 2 mL of sterile saline. The areas of pain was then prepped and draped in the usual sterile fashion. Ultrasound was confirmed to view the adjacent structures for blood vessels and nerves and to confirm the needle path was not within the structures nor was it too deep to be within the lung. A 27g needle was placed into each of the markings at the areas above under ultrasound guidance with an in plane approach.. After negative aspiration, approximately a 1.5 mL of the above solution was injected. The needle was removed intact after each trigger point injection, and the patient's back was covered with a 4x4 gauze, the area was cleansed with sterile normal saline, and a dressing was applied. There were no complications noted. The images were uploaded to our media tab for permanent storage.    Preprocedural pain: 8/10  Postprocedural pain: 8/10 total pain.  However the patient did have 100% pain relief in the area of the trigger points but this did not address his  shoulder pain and neck pain.     Junior Mcclain MD  Physical Medicine and Rehabilitation  Interventional Spine and Sports Physiatry  [facility]

## 2019-07-03 NOTE — PROGRESS NOTES
Follow up patient note  Interventional spine and sports physiatry, Physical medicine rehabilitation      Chief complaint:   Chief Complaint   Patient presents with   • Follow-Up     Shoulder pain          HISTORY    Please see new patient note dated  by Dr Mcclain,  for more details.     HPI  Patient identification: Matthew Perez 69 y.o. male  With Diagnoses of Myalgia, Right arm weakness, Cervical radiculopathy, Right hand weakness, Osteoarthritis of spine with radiculopathy, cervical region, and Ulnar neuropathy at elbow, right were pertinent to this visit.       The patient continues to have severe pain in the right side of the neck, shoulder, associated trigger points in the rhomboids and upper back as well.  Associated numbness and weakness in the hand as well.  Difficult for the patient to say if these pains are related or if they are separate.  Burning type pain.       ROS Red Flags :   Fever, Chills, Sweats: Denies  Involuntary Weight Loss: Denies  Bowel/Bladder Incontinence: Denies  Saddle Anesthesia: Denies        PMHx:   Past Medical History:   Diagnosis Date   • CAD (coronary artery disease) 3/22/2010   • CATARACT 3/28/2012   • Cataract, diabetic (HCC) 4/2/2015   • Dyslipidemia 3/22/2010   • HTN (hypertension), benign 3/22/2010   • MEDICAL HOME    • Type II or unspecified type diabetes mellitus without mention of complication, not stated as uncontrolled     oral meds       PSHx:   Past Surgical History:   Procedure Laterality Date   • HIP ARTHROPLASTY TOTAL  1/20/2015    Performed by Ziyad Tang M.D. at SURGERY [facility]   • HIP ARTHROPLASTY TOTAL  10/21/2014    Performed by Ziyad Tang M.D. at SURGERY [facility]   • OTHER ORTHOPEDIC SURGERY  1990    right knee arthroscopy   • OTHER      TONSILLECTOMY       Family history   Family History   Problem Relation Age of Onset   • Cancer Father    • Cancer Brother          Medications:   Outpatient Prescriptions Marked as  Taking for the 7/3/19 encounter (Office Visit) with Junior Mcclain M.D.   Medication Sig Dispense Refill   • lisinopril (PRINIVIL, ZESTRIL) 40 MG tablet Take 1 Tab by mouth every day. 100 Tab 3   • hydroCHLOROthiazide (HYDRODIURIL) 25 MG Tab Take 1 Tab by mouth every day. 100 Tab 3   • metFORMIN (GLUCOPHAGE) 500 MG Tab Take 1 Tab by mouth 2 times a day, with meals. 200 Tab 3   • atorvastatin (LIPITOR) 40 MG Tab Take 1 Tab by mouth every day. 100 Tab 3   • gabapentin (NEURONTIN) 300 MG Cap Take 1 Cap by mouth every bedtime for 60 days. Start 1 PO at HS daily, may increase to 2 at HS or BID if desired effect not reached 30 Cap 1   • aspirin (ASA) 325 MG TABS Take 325 mg by mouth 2 Times a Day.     • Coenzyme Q10 (CO Q 10 PO) Take  by mouth 2 Times a Day.     • therapeutic multivitamin-minerals (THERAGRAN-M) TABS Take 1 Tab by mouth every morning.          Current Outpatient Prescriptions on File Prior to Visit   Medication Sig Dispense Refill   • hydroCHLOROthiazide (HYDRODIURIL) 25 MG Tab Take 1 Tab by mouth every day. 100 Tab 3   • metFORMIN (GLUCOPHAGE) 500 MG Tab Take 1 Tab by mouth 2 times a day, with meals. 200 Tab 3   • atorvastatin (LIPITOR) 40 MG Tab Take 1 Tab by mouth every day. 100 Tab 3   • gabapentin (NEURONTIN) 300 MG Cap Take 1 Cap by mouth every bedtime for 60 days. Start 1 PO at HS daily, may increase to 2 at HS or BID if desired effect not reached 30 Cap 1   • aspirin (ASA) 325 MG TABS Take 325 mg by mouth 2 Times a Day.     • Coenzyme Q10 (CO Q 10 PO) Take  by mouth 2 Times a Day.     • therapeutic multivitamin-minerals (THERAGRAN-M) TABS Take 1 Tab by mouth every morning.     • baclofen (LIORESAL) 10 MG Tab For the first 3 days take 5 mg up to 3 times a day, thereafter can take 10 mg p.o. up to 3 times daily as needed for muscle spasm (Patient not taking: Reported on 6/24/2019) 30 Tab 0   • methylPREDNISolone (MEDROL DOSEPAK) 4 MG Tablet Therapy Pack Take daily according to directions on the  "packet (Patient not taking: Reported on 6/24/2019) 1 Kit 0     No current facility-administered medications on file prior to visit.          Allergies:   No Known Allergies    Social Hx:   Social History     Social History   • Marital status:      Spouse name: N/A   • Number of children: N/A   • Years of education: N/A     Occupational History   • Not on file.     Social History Main Topics   • Smoking status: Former Smoker     Packs/day: 1.00     Years: 12.00     Types: Cigarettes     Quit date: 1/1/1979   • Smokeless tobacco: Never Used   • Alcohol use 1.8 oz/week     3 Glasses of wine per week      Comment: 3 per week   • Drug use: No   • Sexual activity: Yes     Partners: Female     Other Topics Concern   •  Service Yes   • Blood Transfusions No   • Caffeine Concern No   • Occupational Exposure No   • Hobby Hazards No   • Sleep Concern No   • Stress Concern No   • Weight Concern No   • Special Diet No   • Back Care No   • Exercise Yes   • Bike Helmet No   • Seat Belt Yes   • Self-Exams Yes     Social History Narrative   • No narrative on file         EXAMINATION     Physical Exam:   Vitals: /72 (BP Location: Right arm, Patient Position: Sitting, BP Cuff Size: Adult)   Pulse 98   Temp 36.4 °C (97.5 °F) (Temporal)   Ht 1.93 m (6' 4\")   Wt 100.1 kg (220 lb 10.9 oz)   SpO2 98%     Constitutional:   Body Habitus: Body mass index is 26.86 kg/m².  Cooperation: Fully cooperates with exam  Appearance: Well-groomed no disheveled    Respiratory-  breathing comfortable on room air, no audible wheezing  Cardiovascular- capillary refills less than 2 seconds. No lower extremity edema is noted.   Psychiatric- alert and oriented ×3. Normal affect.    MSK: -Multiple trigger points, worse in the right sided rhomboids, right thoracic paraspinous muscles and right trapezius muscles.  See previous exam for more details.        MEDICAL DECISION MAKING    DATA    Labs:   Lab Results   Component Value Date/Time "    SODIUM 136 04/01/2019 09:26 AM    POTASSIUM 3.9 04/01/2019 09:26 AM    CHLORIDE 99 04/01/2019 09:26 AM    CO2 30 04/01/2019 09:26 AM    GLUCOSE 96 04/01/2019 09:26 AM    BUN 14 04/01/2019 09:26 AM    CREATININE 0.83 04/01/2019 09:26 AM    CREATININE 1.0 09/02/2008 08:37 AM        No results found for: PROTHROMBTM, INR     Lab Results   Component Value Date/Time    WBC 7.8 01/22/2015 01:49 AM    RBC 3.81 (L) 01/22/2015 01:49 AM    HEMOGLOBIN 11.9 (L) 01/22/2015 01:49 AM    HEMATOCRIT 34.4 (L) 01/22/2015 01:49 AM    MCV 90.3 01/22/2015 01:49 AM    MCH 31.2 01/22/2015 01:49 AM    MCHC 34.6 01/22/2015 01:49 AM    MPV 11.6 01/22/2015 01:49 AM    NEUTSPOLYS 53.9 01/13/2015 12:13 PM    LYMPHOCYTES 28.8 01/13/2015 12:13 PM    MONOCYTES 12.1 01/13/2015 12:13 PM    EOSINOPHILS 3.4 01/13/2015 12:13 PM    BASOPHILS 1.4 01/13/2015 12:13 PM        Lab Results   Component Value Date/Time    HBA1C 6.6 (H) 04/01/2019 09:26 AM          Imaging:   I personally reviewed following images, these are my reads  MRI cervical spine 6/25/2019  Cervical spondylosis worse on the right side C3-4, C4-5, C5-6.  There is severe neuroforaminal stenosis on the right C4-5 and C5-6.  No significant central canal stenosis at any level.     IMAGING radiology reads. I reviewed the following radiology reads                                        Results for orders placed in visit on 06/25/19   MR-CERVICAL SPINE-W/O     Impression 1.  Minimal to mild multilevel cervical spondylotic change.     2.  Severe multilevel right-sided neural foraminal narrowing.                                                                                            Results for orders placed in visit on 06/24/19   DX-CERVICAL SPINE-2 OR 3 VIEWS     Impression 1.  There is no acute fracture or malalignment of the cervical spine.  2.  There is degenerative disc disease and arthropathy as noted above.               Results for orders placed during the hospital encounter of  07/01/19   DX-CHEST-2 VIEWS     Impression No acute cardiopulmonary findings.                             Results for orders placed in visit on 08/22/14   DX-HAND 3+     Impression No acute fracture is identified.               Results for orders placed during the hospital encounter of 01/20/15   DX-HIP-COMPLETE - UNILATERAL 2+     Impression Left hip replacement surgery.                                                       Results for orders placed in visit on 08/22/14   DX-WRIST-COMPLETE 3+     Impression Soft tissue swelling with no acute fracture identified. Tiny fragment old or possibly accessory ossification posterior to the carpal bones.       ULTRASOUND  7/3/2019 I performed a limited diagnostic ultrasound of the right elbow.  There is a hypoechoic appearing ulnar nerve with dynamic changes with elbow flexion to be high riding near the medial epicondyle.  No dislocation of the nerve.  This is consistent with ulnar neuropathy.    DIAGNOSIS   Visit Diagnoses     ICD-10-CM   1. Myalgia M79.10   2. Right arm weakness R29.898   3. Cervical radiculopathy M54.12   4. Right hand weakness R29.898   5. Osteoarthritis of spine with radiculopathy, cervical region M47.22   6. Ulnar neuropathy at elbow, right G56.21         ASSESSMENT and PLAN:     Matthew Perez 69 y.o. male      Matthew was seen today for follow-up.    Diagnoses and all orders for this visit:    Myalgia  -     REFERRAL TO PHYSIATRY (PMR)    Right arm weakness  -     REFERRAL TO PHYSICIAL MEDICINE REHAB    Cervical radiculopathy  -     REFERRAL TO PHYSICIAL MEDICINE REHAB    Right hand weakness  -     REFERRAL TO PHYSICIAL MEDICINE REHAB    Osteoarthritis of spine with radiculopathy, cervical region    Ulnar neuropathy at elbow, right        The patient did have improvement in the area of triggers points however this did not address the patient's index pain of severe right sided neck and shoulder pain radiating down the arm with associated pain  and weakness.  Because of the patient's continued severe pain I scheduled the patient for a cervical epidural C7-T1.    The risks benefits and alternatives to this procedure were discussed and the patient wishes to proceed with the procedure. Risks include but are not limited to damage to surrounding structures, infection, bleeding, worsening of pain which can be permanent, weakness which can be permanent. Benefits include pain relief, improved function. Alternatives includes not doing the procedure.       I also placed an order for approval for a second trigger point injections to the trigger point recur at the follow-up visit.    Follow up: 2 weeks after the procedure    Thank you for allowing me to participate in the care of this patient. If you have any questions please not hesitate to contact me.          Please note that this dictation was created using voice recognition software. I have made every reasonable attempt to correct obvious errors but there may be errors of grammar and content that I may have overlooked prior to finalization of this note.      Junior Mcclain MD  Interventional Spine and Sports Physiatry  Physical Medicine and Rehabilitation  [facility]  7/3/2019  10:49 AM

## 2019-07-10 ENCOUNTER — HOSPITAL ENCOUNTER (OUTPATIENT)
Dept: PAIN MANAGEMENT | Facility: REHABILITATION | Age: 69
End: 2019-07-10
Attending: PHYSICAL MEDICINE & REHABILITATION
Payer: MEDICARE

## 2019-07-10 ENCOUNTER — HOSPITAL ENCOUNTER (OUTPATIENT)
Dept: RADIOLOGY | Facility: REHABILITATION | Age: 69
End: 2019-07-10
Attending: PHYSICAL MEDICINE & REHABILITATION

## 2019-07-10 VITALS
HEIGHT: 76 IN | RESPIRATION RATE: 16 BRPM | BODY MASS INDEX: 26.44 KG/M2 | WEIGHT: 217.15 LBS | TEMPERATURE: 98.7 F | DIASTOLIC BLOOD PRESSURE: 70 MMHG | SYSTOLIC BLOOD PRESSURE: 119 MMHG | HEART RATE: 97 BPM | OXYGEN SATURATION: 95 %

## 2019-07-10 PROCEDURE — 62321 NJX INTERLAMINAR CRV/THRC: CPT

## 2019-07-10 PROCEDURE — 700117 HCHG RX CONTRAST REV CODE 255

## 2019-07-10 PROCEDURE — 700111 HCHG RX REV CODE 636 W/ 250 OVERRIDE (IP)

## 2019-07-10 RX ORDER — LIDOCAINE HYDROCHLORIDE 10 MG/ML
INJECTION, SOLUTION EPIDURAL; INFILTRATION; INTRACAUDAL; PERINEURAL
Status: COMPLETED
Start: 2019-07-10 | End: 2019-07-10

## 2019-07-10 RX ORDER — DEXAMETHASONE SODIUM PHOSPHATE 10 MG/ML
INJECTION, SOLUTION INTRAMUSCULAR; INTRAVENOUS
Status: COMPLETED
Start: 2019-07-10 | End: 2019-07-10

## 2019-07-10 RX ADMIN — DEXAMETHASONE SODIUM PHOSPHATE 10 MG: 10 INJECTION, SOLUTION INTRAMUSCULAR; INTRAVENOUS at 15:00

## 2019-07-10 RX ADMIN — IOHEXOL 3 ML: 240 INJECTION, SOLUTION INTRATHECAL; INTRAVASCULAR; INTRAVENOUS; ORAL at 15:00

## 2019-07-10 RX ADMIN — LIDOCAINE HYDROCHLORIDE 10 ML: 10 INJECTION, SOLUTION EPIDURAL; INFILTRATION; INTRACAUDAL; PERINEURAL at 15:00

## 2019-07-10 NOTE — NON-PROVIDER
Reviewed Plan of Care with patient. Confirmed that he/she has stopped all blood thinning medications for last 5 days.  Confirmed that he has a . Reviewed home care instructions with patient prior to procedure. All questions and concerns addressed.

## 2019-07-10 NOTE — PROCEDURES
Date of Service: 7/10/2019    Physician/s: Junior Mcclain MD    Pre-operative Diagnosis: Cervical radiculopathy    Post-operative Diagnosis: Cervical radiculopathy    Procedure: C7-T1  Cervical interlaminar epidural steroid injection    Description of procedure:    The risks, benefits, and alternatives of the procedure were reviewed and discussed with the patient.  Written informed consent was freely obtained. A pre-procedural time-out was conducted by the physician verifying patient’s identity, procedure to be performed, procedure site and side, and allergy verification. Appropriate equipment was determined to be in place for the procedure.       The patient's vital signs were carefully monitored before, throughout, and after the procedure.     In the fluoroscopy suite the patient was placed in a prone position, a pillow placed underneath their chest. The skin was prepped and draped in the usual sterile fashion. The fluoroscope was placed over the cervical neck at the appropriate injection AP angle view, and the target for injection was marked. A 25g needle was placed into the marked site, and approx 2cc of 1% Lidocaine was injected subcutaneously into the epidermal and dermal layers. The needle was removed. A 20g Tuohy needle was then placed and advanced under fluoroscopic guidance into the right C7-T1 interlaminar space at both the initial position AP view and contralateral oblique at a lateral view to ensure proper location of the needle tip at all times. Loss of resistance technique was used to guide the needle into the epidural space in a lateral fluoroscopic view and confirmed with loss of resistance with sterile normal saline.  Initially with contrast the flow was not epidural.  The needle was adjusted slightly.  In the AP and lateral views, contrast dye was used.  to highlight the epidural space spread while the fluoroscope was running live. Following negative aspiration, 2mL of 1% lidocaine and 1mL of  10mg/mL of dexamethasone and the needle was removed intact after restyleted. The patient's back was covered with a 4x4 gauze, the area was cleansed with sterile normal saline, and a dressing was applied. There were no complications noted.     The patient was then evaluated post-procedure, and was hemodynamically stable prior to leaving the post-operative care unit.     Junior Mcclain MD  Physical Medicine and Rehabilitation  Interventional Spine and Sports Physiatry  [facility]        CPT  interlaminar cervical epidural: [id number]

## 2019-07-11 ENCOUNTER — TELEPHONE (OUTPATIENT)
Dept: PHYSICAL MEDICINE AND REHAB | Facility: MEDICAL CENTER | Age: 69
End: 2019-07-11

## 2019-07-11 NOTE — TELEPHONE ENCOUNTER
Spoke to Pt in regards to his Special procedure that was done 07/10/19 w/ Dr. Mcclain and he mentioned that he is doing fine.

## 2019-07-18 ENCOUNTER — OFFICE VISIT (OUTPATIENT)
Dept: PHYSICAL MEDICINE AND REHAB | Facility: MEDICAL CENTER | Age: 69
End: 2019-07-18
Payer: MEDICARE

## 2019-07-18 VITALS
OXYGEN SATURATION: 95 % | TEMPERATURE: 97.7 F | SYSTOLIC BLOOD PRESSURE: 128 MMHG | HEIGHT: 76 IN | BODY MASS INDEX: 27.06 KG/M2 | DIASTOLIC BLOOD PRESSURE: 70 MMHG | WEIGHT: 222.22 LBS | HEART RATE: 68 BPM

## 2019-07-18 DIAGNOSIS — M54.12 CERVICAL RADICULOPATHY: ICD-10-CM

## 2019-07-18 DIAGNOSIS — M79.10 MYALGIA: ICD-10-CM

## 2019-07-18 DIAGNOSIS — R29.898 RIGHT HAND WEAKNESS: ICD-10-CM

## 2019-07-18 DIAGNOSIS — G56.21 ULNAR NEUROPATHY AT ELBOW, RIGHT: ICD-10-CM

## 2019-07-18 DIAGNOSIS — M47.22 OSTEOARTHRITIS OF SPINE WITH RADICULOPATHY, CERVICAL REGION: ICD-10-CM

## 2019-07-18 DIAGNOSIS — R29.898 RIGHT ARM WEAKNESS: ICD-10-CM

## 2019-07-18 PROCEDURE — 76942 ECHO GUIDE FOR BIOPSY: CPT | Performed by: PHYSICAL MEDICINE & REHABILITATION

## 2019-07-18 PROCEDURE — 20553 NJX 1/MLT TRIGGER POINTS 3/>: CPT | Performed by: PHYSICAL MEDICINE & REHABILITATION

## 2019-07-18 RX ORDER — DEXAMETHASONE SODIUM PHOSPHATE 4 MG/ML
4 INJECTION, SOLUTION INTRA-ARTICULAR; INTRALESIONAL; INTRAMUSCULAR; INTRAVENOUS; SOFT TISSUE ONCE
Status: COMPLETED | OUTPATIENT
Start: 2019-07-18 | End: 2019-07-18

## 2019-07-18 RX ADMIN — DEXAMETHASONE SODIUM PHOSPHATE 4 MG: 4 INJECTION, SOLUTION INTRA-ARTICULAR; INTRALESIONAL; INTRAMUSCULAR; INTRAVENOUS; SOFT TISSUE at 09:27

## 2019-07-18 ASSESSMENT — PATIENT HEALTH QUESTIONNAIRE - PHQ9: CLINICAL INTERPRETATION OF PHQ2 SCORE: 0

## 2019-07-18 ASSESSMENT — PAIN SCALES - GENERAL: PAINLEVEL: 5=MODERATE PAIN

## 2019-07-18 NOTE — PROCEDURES
Date of Service: 7/18/2019     Physician/s: Junior Mcclain MD    Pre-operative Diagnosis: Myalgia (M79.1)    Post-operative Diagnosis: Myalgia (M79.1)    Procedure: Right trapezius muscle, right rhomboid, right thoracic paraspinous muscle, right thoracic intercostal muscle trigger point injections    Description of procedure:    The risks, benefits, and alternatives of the procedure were reviewed and discussed with the patient.  Written informed consent was freely obtained. A pre-procedural time-out was conducted by the physician verifying patient’s identity, procedure to be performed, procedure site and side, and allergy verification. Appropriate equipment was determined to be in place for the procedure.     In the office suite exam room the patient was placed in a prone position and the skin areas for injection over the Right trapezius muscle, right rhomboid, right thoracic paraspinous muscle, right thoracic intercostal musclewas marked. A solution was prepared with 1 mL of 4 mg/mL of dexamethasone, 3 mL of 1% lidocaine and 2 mL of sterile saline. The areas of pain was then prepped and draped in the usual sterile fashion. Ultrasound was confirmed to view the adjacent structures for blood vessels and nerves and to confirm the needle path was not within the structures nor was it too deep to be within the lung. A 27g needle was placed into each of the markings at the areas above under ultrasound guidance with an in plane approach.. After negative aspiration, approximately a 1.5 mL of the above solution was injected. The needle was removed intact after each trigger point injection, and the patient's back was covered with a 4x4 gauze, the area was cleansed with sterile normal saline, and a dressing was applied. There were no complications noted. The images were uploaded to our media tab for permanent storage.    The patient had no seen in the area of his trigger point immediately post procedure    Junior Mcclain  MD  Physical Medicine and Rehabilitation  Interventional Spine and Sports Physiatry  [facility]

## 2019-07-18 NOTE — PROGRESS NOTES
Follow up patient note  Interventional spine and sports physiatry, Physical medicine rehabilitation      Chief complaint:   Chief Complaint   Patient presents with   • Follow-Up     Myalgia          HISTORY    Please see new patient note dated  by Dr Mcclain,  for more details.     HPI  Patient identification: Matthew Perez 69 y.o. male  With Diagnoses of Myalgia, Right arm weakness, Cervical radiculopathy, Right hand weakness, Osteoarthritis of spine with radiculopathy, cervical region, and Ulnar neuropathy at elbow, right were pertinent to this visit.     s/p c7-T1 Cervical epidural interlaminar steroid injection which I performed on 7/10/2019.  After the procedure the patient has complete resolution of the pain which is in his neck radiating down the arm.  He is now able to have normal motions within the right shoulder with no significant difficulties.  He reports that that pain overall is 95% improved and that is only 1 out of 10 in intensity.    He does still have significant numbness of the fourth and fifth digits of the right hand and now this is specifically on the fifth digit and the ulnar aspect of the fourth digit, weakness in the right hand as well.  He has been wearing an elbow splint nightly which is helping somewhat with the pain and he is not resting his arm on firm objects.  This is moderate in intensity.    He does still have several trigger points in the thoracic paraspinous muscles, rhomboid muscles, lower trapezius.  These are 5 out of 10 in intensity, aching in quality, tender to palpation.       ROS Red Flags :   Fever, Chills, Sweats: Denies  Involuntary Weight Loss: Denies  Bowel/Bladder Incontinence: Denies  Saddle Anesthesia: Denies        PMHx:   Past Medical History:   Diagnosis Date   • CAD (coronary artery disease) 3/22/2010   • CATARACT 3/28/2012   • Cataract, diabetic (HCC) 4/2/2015   • Dyslipidemia 3/22/2010   • HTN (hypertension), benign 3/22/2010   • MEDICAL HOME    •  Type II or unspecified type diabetes mellitus without mention of complication, not stated as uncontrolled     oral meds       PSHx:   Past Surgical History:   Procedure Laterality Date   • HIP ARTHROPLASTY TOTAL  1/20/2015    Performed by Ziyad Tang M.D. at SURGERY [facility]   • HIP ARTHROPLASTY TOTAL  10/21/2014    Performed by Ziyad Tang M.D. at SURGERY [facility]   • OTHER ORTHOPEDIC SURGERY  1990    right knee arthroscopy   • OTHER      TONSILLECTOMY       Family history   Family History   Problem Relation Age of Onset   • Cancer Father    • Cancer Brother          Medications:   Outpatient Prescriptions Marked as Taking for the 7/18/19 encounter (Office Visit) with Junior Mcclain M.D.   Medication Sig Dispense Refill   • lisinopril (PRINIVIL, ZESTRIL) 40 MG tablet Take 1 Tab by mouth every day. 100 Tab 3   • hydroCHLOROthiazide (HYDRODIURIL) 25 MG Tab Take 1 Tab by mouth every day. 100 Tab 3   • metFORMIN (GLUCOPHAGE) 500 MG Tab Take 1 Tab by mouth 2 times a day, with meals. 200 Tab 3   • atorvastatin (LIPITOR) 40 MG Tab Take 1 Tab by mouth every day. 100 Tab 3   • gabapentin (NEURONTIN) 300 MG Cap Take 1 Cap by mouth every bedtime for 60 days. Start 1 PO at HS daily, may increase to 2 at HS or BID if desired effect not reached 30 Cap 1   • aspirin (ASA) 325 MG TABS Take 325 mg by mouth 2 Times a Day.     • Coenzyme Q10 (CO Q 10 PO) Take  by mouth 2 Times a Day.     • therapeutic multivitamin-minerals (THERAGRAN-M) TABS Take 1 Tab by mouth every morning.       Current Facility-Administered Medications for the 7/18/19 encounter (Office Visit) with Junior Mcclain M.D.   Medication Dose Route Frequency Provider Last Rate Last Dose   • dexamethasone (DECADRON) injection 4 mg  4 mg Injection Once Junior Mcclain M.D.            Current Outpatient Prescriptions on File Prior to Visit   Medication Sig Dispense Refill   • lisinopril (PRINIVIL, ZESTRIL) 40 MG tablet Take 1 Tab by mouth  every day. 100 Tab 3   • hydroCHLOROthiazide (HYDRODIURIL) 25 MG Tab Take 1 Tab by mouth every day. 100 Tab 3   • metFORMIN (GLUCOPHAGE) 500 MG Tab Take 1 Tab by mouth 2 times a day, with meals. 200 Tab 3   • atorvastatin (LIPITOR) 40 MG Tab Take 1 Tab by mouth every day. 100 Tab 3   • gabapentin (NEURONTIN) 300 MG Cap Take 1 Cap by mouth every bedtime for 60 days. Start 1 PO at HS daily, may increase to 2 at HS or BID if desired effect not reached 30 Cap 1   • aspirin (ASA) 325 MG TABS Take 325 mg by mouth 2 Times a Day.     • Coenzyme Q10 (CO Q 10 PO) Take  by mouth 2 Times a Day.     • therapeutic multivitamin-minerals (THERAGRAN-M) TABS Take 1 Tab by mouth every morning.     • baclofen (LIORESAL) 10 MG Tab For the first 3 days take 5 mg up to 3 times a day, thereafter can take 10 mg p.o. up to 3 times daily as needed for muscle spasm (Patient not taking: Reported on 6/24/2019) 30 Tab 0     No current facility-administered medications on file prior to visit.          Allergies:   No Known Allergies    Social Hx:   Social History     Social History   • Marital status:      Spouse name: N/A   • Number of children: N/A   • Years of education: N/A     Occupational History   • Not on file.     Social History Main Topics   • Smoking status: Former Smoker     Packs/day: 1.00     Years: 12.00     Types: Cigarettes     Quit date: 1/1/1979   • Smokeless tobacco: Never Used   • Alcohol use 1.8 oz/week     3 Glasses of wine per week      Comment: 3 per week   • Drug use: No   • Sexual activity: Yes     Partners: Female     Other Topics Concern   •  Service Yes   • Blood Transfusions No   • Caffeine Concern No   • Occupational Exposure No   • Hobby Hazards No   • Sleep Concern No   • Stress Concern No   • Weight Concern No   • Special Diet No   • Back Care No   • Exercise Yes   • Bike Helmet No   • Seat Belt Yes   • Self-Exams Yes     Social History Narrative   • No narrative on file         EXAMINATION  "    Physical Exam:   Vitals: /70 (BP Location: Left arm, Patient Position: Sitting, BP Cuff Size: Adult)   Pulse 68   Temp 36.5 °C (97.7 °F) (Temporal)   Ht 1.93 m (6' 4\")   Wt 100.8 kg (222 lb 3.6 oz)   SpO2 95%     Constitutional:   Body Habitus: Body mass index is 27.05 kg/m².  Cooperation: Fully cooperates with exam  Appearance: Well-groomed no disheveled    Respiratory-  breathing comfortable on room air, no audible wheezing  Cardiovascular- capillary refills less than 2 seconds. No lower extremity edema is noted.   Psychiatric- alert and oriented ×3. Normal affect.    MSK: -Full range of motion in the bilateral shoulders, full range of motion of the cervical spine.  Spurling's is now negative bilaterally.      Motor Exam Upper Extremities   ? Myotome R L   Shoulder flexion C5 5 5   Elbow flexion C5 5 5   Wrist extension C6 5 5   Elbow extension C7 5 5   Finger flexion C8 5 5   Finger abduction T1 2 5       Key points for the international standards for neurological classification of spinal cord injury (ISNCSCI) to light touch.     Dermatome R L   C4 2 2   C5 2 2   C6 2 2   C7 2 2   C8 2 2   T1 1 2   T2 2 2   L2 2 2   L3 2 2   L4 2 2   L5 2 2   S1 2 2   S2 2 2          M ultiple trigger points, worse in the right sided rhomboids, right thoracic paraspinous muscles and right trapezius muscles.  See previous exam for more details.        MEDICAL DECISION MAKING    DATA    Labs:   Lab Results   Component Value Date/Time    SODIUM 136 04/01/2019 09:26 AM    POTASSIUM 3.9 04/01/2019 09:26 AM    CHLORIDE 99 04/01/2019 09:26 AM    CO2 30 04/01/2019 09:26 AM    GLUCOSE 96 04/01/2019 09:26 AM    BUN 14 04/01/2019 09:26 AM    CREATININE 0.83 04/01/2019 09:26 AM    CREATININE 1.0 09/02/2008 08:37 AM        No results found for: PROTHROMBTM, INR     Lab Results   Component Value Date/Time    WBC 7.8 01/22/2015 01:49 AM    RBC 3.81 (L) 01/22/2015 01:49 AM    HEMOGLOBIN 11.9 (L) 01/22/2015 01:49 AM    HEMATOCRIT " 34.4 (L) 01/22/2015 01:49 AM    MCV 90.3 01/22/2015 01:49 AM    MCH 31.2 01/22/2015 01:49 AM    MCHC 34.6 01/22/2015 01:49 AM    MPV 11.6 01/22/2015 01:49 AM    NEUTSPOLYS 53.9 01/13/2015 12:13 PM    LYMPHOCYTES 28.8 01/13/2015 12:13 PM    MONOCYTES 12.1 01/13/2015 12:13 PM    EOSINOPHILS 3.4 01/13/2015 12:13 PM    BASOPHILS 1.4 01/13/2015 12:13 PM        Lab Results   Component Value Date/Time    HBA1C 6.6 (H) 04/01/2019 09:26 AM          Imaging:   I personally reviewed following images, these are my reads  MRI cervical spine 6/25/2019  Cervical spondylosis worse on the right side C3-4, C4-5, C5-6.  There is severe neuroforaminal stenosis on the right C4-5 and C5-6.  No significant central canal stenosis at any level.     IMAGING radiology reads. I reviewed the following radiology reads                                        Results for orders placed in visit on 06/25/19   MR-CERVICAL SPINE-W/O     Impression 1.  Minimal to mild multilevel cervical spondylotic change.     2.  Severe multilevel right-sided neural foraminal narrowing.                                                                                            Results for orders placed in visit on 06/24/19   DX-CERVICAL SPINE-2 OR 3 VIEWS     Impression 1.  There is no acute fracture or malalignment of the cervical spine.  2.  There is degenerative disc disease and arthropathy as noted above.               Results for orders placed during the hospital encounter of 07/01/19   DX-CHEST-2 VIEWS     Impression No acute cardiopulmonary findings.                             Results for orders placed in visit on 08/22/14   DX-HAND 3+     Impression No acute fracture is identified.               Results for orders placed during the hospital encounter of 01/20/15   DX-HIP-COMPLETE - UNILATERAL 2+     Impression Left hip replacement surgery.                                                       Results for orders placed in visit on 08/22/14   DX-WRIST-COMPLETE  3+     Impression Soft tissue swelling with no acute fracture identified. Tiny fragment old or possibly accessory ossification posterior to the carpal bones.       ULTRASOUND  7/3/2019 I performed a limited diagnostic ultrasound of the right elbow.  There is a hypoechoic appearing ulnar nerve with dynamic changes with elbow flexion to be high riding near the medial epicondyle.  No dislocation of the nerve.  This is consistent with ulnar neuropathy.    DIAGNOSIS   Visit Diagnoses     ICD-10-CM   1. Myalgia M79.10   2. Right arm weakness R29.898   3. Cervical radiculopathy M54.12   4. Right hand weakness R29.898   5. Osteoarthritis of spine with radiculopathy, cervical region M47.22   6. Ulnar neuropathy at elbow, right G56.21         ASSESSMENT and PLAN:     Matthew Perez 69 y.o. male      Matthew was seen today for follow-up.    Diagnoses and all orders for this visit:    Myalgia  Comments:  Not controlled with several areas with trigger points in the thoracic paraspinous, rhomboid and lower trapezius.  See note for repeat trigger point injection  Orders:  -     dexamethasone (DECADRON) injection 4 mg; 1 mL by Injection route Once.    Right arm weakness  Comments:  Significantly improved after the cervical epidural    Cervical radiculopathy  Comments:  Dramatic improvement with near complete resolution of the patient's symptoms after C 71 epidural steroid injection.     Right hand weakness  Comments:  Persistent in ulnar distribution which is likely responsible for this.  Radiculopathy improved    Osteoarthritis of spine with radiculopathy, cervical region  Comments:  Significantly improved after cervical epidural    Ulnar neuropathy at elbow, right  Comments:  Persists after the cervical epidural and is likely a separate cause.  I offered a sooner EMG but the patient was unable to make these appointments            Follow up: after the EMG    Thank you for allowing me to participate in the care of this  patient. If you have any questions please not hesitate to contact me.          Please note that this dictation was created using voice recognition software. I have made every reasonable attempt to correct obvious errors but there may be errors of grammar and content that I may have overlooked prior to finalization of this note.      Junior Mcclain MD  Interventional Spine and Sports Physiatry  Physical Medicine and Rehabilitation  [facility]  7/18/2019  9:24 AM

## 2019-08-06 ENCOUNTER — OFFICE VISIT (OUTPATIENT)
Dept: PHYSICAL MEDICINE AND REHAB | Facility: MEDICAL CENTER | Age: 69
End: 2019-08-06
Payer: MEDICARE

## 2019-08-06 VITALS
TEMPERATURE: 98.2 F | DIASTOLIC BLOOD PRESSURE: 70 MMHG | WEIGHT: 227.96 LBS | OXYGEN SATURATION: 96 % | SYSTOLIC BLOOD PRESSURE: 120 MMHG | HEART RATE: 82 BPM | BODY MASS INDEX: 27.76 KG/M2 | HEIGHT: 76 IN

## 2019-08-06 DIAGNOSIS — M54.12 CERVICAL RADICULOPATHY: ICD-10-CM

## 2019-08-06 DIAGNOSIS — G56.03 BILATERAL CARPAL TUNNEL SYNDROME: ICD-10-CM

## 2019-08-06 DIAGNOSIS — G56.21 ULNAR NEUROPATHY AT ELBOW, RIGHT: ICD-10-CM

## 2019-08-06 DIAGNOSIS — M79.10 MYALGIA: ICD-10-CM

## 2019-08-06 PROCEDURE — 99214 OFFICE O/P EST MOD 30 MIN: CPT | Mod: 25 | Performed by: PHYSICAL MEDICINE & REHABILITATION

## 2019-08-06 PROCEDURE — 95886 MUSC TEST DONE W/N TEST COMP: CPT | Performed by: PHYSICAL MEDICINE & REHABILITATION

## 2019-08-06 PROCEDURE — 95913 NRV CNDJ TEST 13/> STUDIES: CPT | Performed by: PHYSICAL MEDICINE & REHABILITATION

## 2019-08-06 ASSESSMENT — PAIN SCALES - GENERAL: PAINLEVEL: NO PAIN

## 2019-08-06 NOTE — PROCEDURES
"  [facility]  Sports and Spine, Physiatry, EMG  [facility]  [address]  [place] [address]    Test Date:  2019    Patient: Favian Perez : 1950 Physician: Junior Mcclain M.D.   Sex: Male Height: 6' 4\" Ref Phys: Junior Mcclain MD   ID#: [id number] Weight: 103.4 kg Technician: Junior Mcclain     Patient Complaints:  Right arm and hand numbness and weakness. See clinic notes from Dr Mcclain for more details.  There is weakness of first dorsal interosseous and abductor digit he minimi on the right.    Patient History / Exam:  Temp 96 °F at the right carpal tunnel.  See previous exam from Dr. Mcclain for more details.    NCV & EMG Findings:  Evaluation of the left median motor nerve showed prolonged distal onset latency (4.4 ms) and reduced amplitude (4.9 mV).  The right median motor nerve showed prolonged distal onset latency (5.1 ms), reduced amplitude (1.8 mV), and decreased conduction velocity (Elbow-Wrist, 47 m/s).  The right ulnar motor nerve showed reduced amplitude (2.7 mV), decreased conduction velocity (B Elbow-Wrist, 51 m/s), and decreased conduction velocity (A Elbow-B Elbow, 38 m/s).  The left median sensory and the right median sensory nerves showed prolonged distal peak latency (L3.8, R4.5 ms) and decreased conduction velocity (Wrist-2nd Digit, L37, R31 m/s).  The right median/radial (dig I) comparison nerve showed prolonged distal peak latency (Median, 3.6 ms) and abnormal peak latency difference (Median-Radial, 1.1 ms).  The right median/ulnar (dig IV) comparison nerve showed no response (Median Wr) and prolonged distal peak latency (Ulnar Wr, 3.7 ms).  The right median/ulnar (palm) comparison nerve showed abnormal peak latency difference (Median Palm-Ulnar Palm, 0.5 ms).  All remaining nerves (as indicated in the following tables) were within normal limits.  Left vs. Right side comparison data for the median motor nerve indicates abnormal L-R amplitude " difference (63.3 %).  The median sensory nerve indicates abnormal L-R latency difference (0.7 ms).      Needle evaluation of the right abductor pollicis brevis muscle showed increased insertional activity, moderately increased spontaneous activity, increased motor unit amplitude, and diminished recruitment.  The right first dorsal interosseous muscle showed increased insertional activity, widespread spontaneous activity, increased motor unit amplitude, and diminished recruitment.  All remaining muscles (as indicated in the following table) showed no evidence of electrical instability.      Impression:  There is electrodiagnostic evidence of a right sensorimotor median mononeuropathy at the wrist with demyelination and axonal damage consistent with carpal tunnel syndrome.    There is electrodiagnostic evidence of a right demyelinating ulnar neuropathy at the elbow consistent with cubital tunnel syndrome.    Today's exam points against cervical radiculopathy.    Recommendations:  The patient's symptoms are most consistent with an ulnar neuropathy at the elbow however EMG findings were significant for both carpal tunnel syndrome and cubital tunnel syndrome.  We discussed treatment options and I referred the patient to hand surgery for a surgical evaluation.    ___________________________  Junior Mcclain M.D.        Nerve Conduction Studies  Anti Sensory Summary Table     Stim Site NR Peak (ms) Norm Peak (ms) P-T Amp (µV) Norm P-T Amp Site1 Site2 Delta-P (ms) Dist (cm) Dorian (m/s) Norm Dorian (m/s)   Left Median Anti Sensory (2nd Digit)   Wrist    3.8 <3.6 37.5 >10 Wrist 2nd Digit 3.8 14.0 37 >39   Right Median Anti Sensory (2nd Digit)   Wrist    4.5 <3.6 17.2 >10 Wrist 2nd Digit 4.5 14.0 31 >39   Elbow    3.3  28.3  Elbow Wrist 1.2 0.0  >48   Right Radial Anti Sensory (Base 1st Digit)   Wrist    2.2 <3.1 11.2  Wrist Base 1st Digit 2.2 0.0     Right Ulnar Anti Sensory (5th Digit)   Wrist    3.3 <3.7 18.9 >15.0 Wrist 5th  Digit 3.3 14.0 42 >38     Motor Summary Table     Stim Site NR Onset (ms) Norm Onset (ms) O-P Amp (mV) Norm O-P Amp Site1 Site2 Delta-0 (ms) Dist (cm) Dorian (m/s) Norm Dorian (m/s)   Left Median Motor (Abd Poll Brev)   Wrist    4.4 <4.2 4.9 >5         Right Median Motor (Abd Poll Brev)   Wrist    5.1 <4.2 1.8 >5 Elbow Wrist 5.7 27.0 47 >50   Elbow    10.8  1.1  Axilla Elbow 3.0 0.0     Axilla    13.8  0.0          Right Ulnar Motor (Abd Dig Min)   Wrist    4.1 <4.2 2.7 >3 B Elbow Wrist 4.3 22.0 51 >53   B Elbow    8.4  2.3  A Elbow B Elbow 2.6 10.0 38 >53   A Elbow    11.0  2.1            Comparison Summary Table     Stim Site NR Peak (ms) Norm Peak (ms) P-T Amp (µV) Site1 Site2 Delta-P (ms) Norm Delta (ms)   Right Median/Radial Dig I Comparison (Digit 1 - 10cm)   Median    3.6 <2.9 25.4 Median Radial 1.1 <0.4   Radial    2.5 <2.8 8.9       Right Median/Ulnar Dig IV Comparison (Digit 4 - 14cm)   Median Wr NR  <3.3  Median Wr Ulnar Wr  <0.4   Ulnar Wr    3.7 <3.3 19.6       Right Median/Ulnar Palm Comparison (Wrist - 8cm)   Median Palm    2.5 <2.5 32.9 Median Palm Ulnar Palm 0.5 <0.3   Ulnar Palm    2.0 <2.5 71.9         EMG+     Side Muscle Nerve Root Ins Act Fibs Psw Amp Dur Poly Recrt Int Pat Other Comment   Right Deltoid Axillary C5-6 Nml Nml Nml Nml Nml 0 Nml Nml None    Right Biceps Musculocut C5-6 Nml Nml Nml Nml Nml 0 Nml Nml None    Right Triceps Radial C6-7-8 Nml Nml Nml Nml Nml 0 Nml Nml None    Right ExtCarRadBrev Radial C6-7 Nml Nml Nml Nml Nml 0 Nml Nml None    Right Ext Digitorum Radial (Post Int) C7-8 Nml Nml Nml Nml Nml 0 Nml Nml None    Right PronatorTeres Median C6-7 Nml Nml Nml Nml Nml 0 Nml Nml None    Right Abd Poll Brev Median C8-T1 Incr 2+ 2+ Incr Nml 0 Reduced Nml None    Right 1stDorInt Ulnar C8-T1 Incr 4+ 4+ Incr Nml 0 Reduced Nml None            Waveforms:                        Ultrasound Images:

## 2019-08-06 NOTE — PROGRESS NOTES
Follow up patient note  Interventional spine and sports physiatry, Physical medicine rehabilitation      Chief complaint:   Chief Complaint   Patient presents with   • Follow-Up     Right arm weakness          HISTORY    Please see new patient note dated  by Dr Mcclain,  for more details.     HPI  Patient identification: Matthew Perez 69 y.o. male  With Diagnoses of Ulnar neuropathy at elbow, right, Cervical radiculopathy, Bilateral carpal tunnel syndrome, and Myalgia were pertinent to this visit.     s/p c7-T1 Cervical epidural interlaminar steroid injection which I performed on 7/10/2019.  Status post trigger point injections performed with ultrasound guidance on 7/18/2019.  He has had near complete resolution of the pain which is now minimal in the neck and rarely radiating down the arm.  He does have numbness which radiates from the right elbow to the right hand in the fourth and fifth digits with associated weakness in the hand.  He also notes a history of carpal tunnel syndrome on the right which was significant many years ago.  He typically does not get symptoms in the first 3 digits and denies burning sensation on the first 3 digits in the palmar aspect.  Trigger points have not recurred.     ROS Red Flags :   Fever, Chills, Sweats: Denies  Involuntary Weight Loss: Denies  Bowel/Bladder Incontinence: Denies  Saddle Anesthesia: Denies        PMHx:   Past Medical History:   Diagnosis Date   • CAD (coronary artery disease) 3/22/2010   • CATARACT 3/28/2012   • Cataract, diabetic (HCC) 4/2/2015   • Dyslipidemia 3/22/2010   • HTN (hypertension), benign 3/22/2010   • MEDICAL HOME    • Type II or unspecified type diabetes mellitus without mention of complication, not stated as uncontrolled     oral meds       PSHx:   Past Surgical History:   Procedure Laterality Date   • HIP ARTHROPLASTY TOTAL  1/20/2015    Performed by Ziyad Tang M.D. at SURGERY [facility]   • HIP ARTHROPLASTY TOTAL   10/21/2014    Performed by Ziyad Tang M.D. at SURGERY [facility]S   • OTHER ORTHOPEDIC SURGERY  1990    right knee arthroscopy   • OTHER      TONSILLECTOMY       Family history   Family History   Problem Relation Age of Onset   • Cancer Father    • Cancer Brother          Medications:   Outpatient Medications Marked as Taking for the 8/6/19 encounter (Office Visit) with Junior Mcclain M.D.   Medication Sig Dispense Refill   • lisinopril (PRINIVIL, ZESTRIL) 40 MG tablet Take 1 Tab by mouth every day. 100 Tab 3   • hydroCHLOROthiazide (HYDRODIURIL) 25 MG Tab Take 1 Tab by mouth every day. 100 Tab 3   • metFORMIN (GLUCOPHAGE) 500 MG Tab Take 1 Tab by mouth 2 times a day, with meals. 200 Tab 3   • atorvastatin (LIPITOR) 40 MG Tab Take 1 Tab by mouth every day. 100 Tab 3   • aspirin (ASA) 325 MG TABS Take 325 mg by mouth 2 Times a Day.     • Coenzyme Q10 (CO Q 10 PO) Take  by mouth 2 Times a Day.     • therapeutic multivitamin-minerals (THERAGRAN-M) TABS Take 1 Tab by mouth every morning.          Current Outpatient Medications on File Prior to Visit   Medication Sig Dispense Refill   • lisinopril (PRINIVIL, ZESTRIL) 40 MG tablet Take 1 Tab by mouth every day. 100 Tab 3   • hydroCHLOROthiazide (HYDRODIURIL) 25 MG Tab Take 1 Tab by mouth every day. 100 Tab 3   • metFORMIN (GLUCOPHAGE) 500 MG Tab Take 1 Tab by mouth 2 times a day, with meals. 200 Tab 3   • atorvastatin (LIPITOR) 40 MG Tab Take 1 Tab by mouth every day. 100 Tab 3   • aspirin (ASA) 325 MG TABS Take 325 mg by mouth 2 Times a Day.     • Coenzyme Q10 (CO Q 10 PO) Take  by mouth 2 Times a Day.     • therapeutic multivitamin-minerals (THERAGRAN-M) TABS Take 1 Tab by mouth every morning.     • gabapentin (NEURONTIN) 300 MG Cap Take 1 Cap by mouth every bedtime for 60 days. Start 1 PO at HS daily, may increase to 2 at HS or BID if desired effect not reached (Patient not taking: Reported on 8/6/2019) 30 Cap 1   • baclofen (LIORESAL) 10 MG Tab For the  first 3 days take 5 mg up to 3 times a day, thereafter can take 10 mg p.o. up to 3 times daily as needed for muscle spasm (Patient not taking: Reported on 2019) 30 Tab 0     No current facility-administered medications on file prior to visit.          Allergies:   No Known Allergies    Social Hx:   Social History     Socioeconomic History   • Marital status:      Spouse name: Not on file   • Number of children: Not on file   • Years of education: Not on file   • Highest education level: Not on file   Occupational History   • Not on file   Social Needs   • Financial resource strain: Not on file   • Food insecurity:     Worry: Not on file     Inability: Not on file   • Transportation needs:     Medical: Not on file     Non-medical: Not on file   Tobacco Use   • Smoking status: Former Smoker     Packs/day: 1.00     Years: 12.00     Pack years: 12.00     Types: Cigarettes     Last attempt to quit: 1979     Years since quittin.6   • Smokeless tobacco: Never Used   Substance and Sexual Activity   • Alcohol use: Yes     Alcohol/week: 1.8 oz     Types: 3 Glasses of wine per week     Comment: 3 per week   • Drug use: No   • Sexual activity: Yes     Partners: Female   Lifestyle   • Physical activity:     Days per week: Not on file     Minutes per session: Not on file   • Stress: Not on file   Relationships   • Social connections:     Talks on phone: Not on file     Gets together: Not on file     Attends [employer] service: Not on file     Active member of club or organization: Not on file     Attends meetings of clubs or organizations: Not on file     Relationship status: Not on file   • Intimate partner violence:     Fear of current or ex partner: Not on file     Emotionally abused: Not on file     Physically abused: Not on file     Forced sexual activity: Not on file   Other Topics Concern   •  Service Yes   • Blood Transfusions No   • Caffeine Concern No   • Occupational Exposure No   • Hobby  "Hazards No   • Sleep Concern No   • Stress Concern No   • Weight Concern No   • Special Diet No   • Back Care No   • Exercise Yes   • Bike Helmet No   • Seat Belt Yes   • Self-Exams Yes   Social History Narrative   • Not on file         EXAMINATION     Physical Exam:   Vitals: /70 (BP Location: Left arm, Patient Position: Sitting, BP Cuff Size: Adult)   Pulse 82   Temp 36.8 °C (98.2 °F) (Temporal)   Ht 1.93 m (6' 4\")   Wt 103.4 kg (227 lb 15.3 oz)   SpO2 96%     Constitutional:   Body Habitus: Body mass index is 27.75 kg/m².  Cooperation: Fully cooperates with exam  Appearance: Well-groomed no disheveled    Respiratory-  breathing comfortable on room air, no audible wheezing  Cardiovascular- capillary refills less than 2 seconds. No lower extremity edema is noted.   Psychiatric- alert and oriented ×3. Normal affect.    MSK: -Full range of motion in the bilateral shoulders, full range of motion of the cervical spine.  Spurling's is negative bilaterally.      Motor Exam Upper Extremities   ? Myotome R L   Shoulder flexion C5 5 5   Elbow flexion C5 5 5   Wrist extension C6 5 5   Elbow extension C7 5 5   Finger flexion C8 5 5   Finger abduction T1 2 5       Key points for the international standards for neurological classification of spinal cord injury (ISNCSCI) to light touch.     Dermatome R L   C4 2 2   C5 2 2   C6 2 2   C7 2 2   C8 2 2   T1 1 2   T2 2 2   L2 2 2   L3 2 2   L4 2 2   L5 2 2   S1 2 2   S2 2 2          There are no trigger points and no tenderness palpation around the neck, rhomboids, paraspinous muscles, trapezius muscles.    Phalen's test is negative in the bilateral wrist, carpal tunnel compression, Tinel's are negative at the wrist.  Tinel's is positive at the right elbow.    MEDICAL DECISION MAKING    DATA    Labs:   Lab Results   Component Value Date/Time    SODIUM 136 04/01/2019 09:26 AM    POTASSIUM 3.9 04/01/2019 09:26 AM    CHLORIDE 99 04/01/2019 09:26 AM    CO2 30 04/01/2019 09:26 " AM    GLUCOSE 96 04/01/2019 09:26 AM    BUN 14 04/01/2019 09:26 AM    CREATININE 0.83 04/01/2019 09:26 AM    CREATININE 1.0 09/02/2008 08:37 AM        No results found for: PROTHROMBTM, INR     Lab Results   Component Value Date/Time    WBC 7.8 01/22/2015 01:49 AM    RBC 3.81 (L) 01/22/2015 01:49 AM    HEMOGLOBIN 11.9 (L) 01/22/2015 01:49 AM    HEMATOCRIT 34.4 (L) 01/22/2015 01:49 AM    MCV 90.3 01/22/2015 01:49 AM    MCH 31.2 01/22/2015 01:49 AM    MCHC 34.6 01/22/2015 01:49 AM    MPV 11.6 01/22/2015 01:49 AM    NEUTSPOLYS 53.9 01/13/2015 12:13 PM    LYMPHOCYTES 28.8 01/13/2015 12:13 PM    MONOCYTES 12.1 01/13/2015 12:13 PM    EOSINOPHILS 3.4 01/13/2015 12:13 PM    BASOPHILS 1.4 01/13/2015 12:13 PM        Lab Results   Component Value Date/Time    HBA1C 6.6 (H) 04/01/2019 09:26 AM          Imaging:   I personally reviewed following images, these are my reads  MRI cervical spine 6/25/2019  Cervical spondylosis worse on the right side C3-4, C4-5, C5-6.  There is severe neuroforaminal stenosis on the right C4-5 and C5-6.  No significant central canal stenosis at any level.     IMAGING radiology reads. I reviewed the following radiology reads                                        Results for orders placed in visit on 06/25/19   MR-CERVICAL SPINE-W/O     Impression 1.  Minimal to mild multilevel cervical spondylotic change.     2.  Severe multilevel right-sided neural foraminal narrowing.                                                                                            Results for orders placed in visit on 06/24/19   DX-CERVICAL SPINE-2 OR 3 VIEWS     Impression 1.  There is no acute fracture or malalignment of the cervical spine.  2.  There is degenerative disc disease and arthropathy as noted above.               Results for orders placed during the hospital encounter of 07/01/19   DX-CHEST-2 VIEWS     Impression No acute cardiopulmonary findings.                             Results for orders placed in  visit on 08/22/14   DX-HAND 3+     Impression No acute fracture is identified.               Results for orders placed during the hospital encounter of 01/20/15   DX-HIP-COMPLETE - UNILATERAL 2+     Impression Left hip replacement surgery.                                                       Results for orders placed in visit on 08/22/14   DX-WRIST-COMPLETE 3+     Impression Soft tissue swelling with no acute fracture identified. Tiny fragment old or possibly accessory ossification posterior to the carpal bones.       ULTRASOUND  7/3/2019 I performed a limited diagnostic ultrasound of the right elbow.  There is a hypoechoic appearing ulnar nerve with dynamic changes with elbow flexion to be high riding near the medial epicondyle.  No dislocation of the nerve.  This is consistent with ulnar neuropathy.    DIAGNOSIS   Visit Diagnoses     ICD-10-CM   1. Ulnar neuropathy at elbow, right G56.21   2. Cervical radiculopathy M54.12   3. Bilateral carpal tunnel syndrome G56.03   4. Myalgia M79.10         ASSESSMENT and PLAN:     Matthew Perez 69 y.o. male      Matthew was seen today for follow-up.    Diagnoses and all orders for this visit:    Ulnar neuropathy at elbow, right  Comments:  Diagnostic ultrasound, EMG and nerve conduction study are positive.  Referred to hand surgery for eval  Orders:  -     REFERRAL TO HAND SURGERY    Cervical radiculopathy  Comments:  Stable, significantly improved after cervical epidural    Bilateral carpal tunnel syndrome  Comments:  Significantly positive on EMG but not significant on exam  Orders:  -     REFERRAL TO HAND SURGERY    Myalgia  Comments:  Stable, improved after trigger point injections      The patient's cervical radiculopathy and trigger points are very well controlled after cervical epidural and trigger point injections.  He no longer has pain in the neck rating down the arm and strength is within normal limits in the bilateral upper extremities with the exception  of an ulnar distribution.  EMG/nerve conduction study was positive for ulnar neuropathy at the elbow and carpal tunnel syndrome bilaterally right worse than left.  Clinically the patient's symptoms are most consistent with the ulnar neuropathy at the elbow and his weakness and paresthesias are in this distribution as well.  I have referred the patient to hand surgery for a consultation.  If the patient is not a surgical candidate and I would consider him for an maria m-ulnar nerve injection      Follow up: after the consult    Thank you for allowing me to participate in the care of this patient. If you have any questions please not hesitate to contact me.          Please note that this dictation was created using voice recognition software. I have made every reasonable attempt to correct obvious errors but there may be errors of grammar and content that I may have overlooked prior to finalization of this note.      Junior Mcclain MD  Interventional Spine and Sports Physiatry  Physical Medicine and Rehabilitation  [facility]  8/6/2019  3:34 PM

## 2019-09-10 DIAGNOSIS — Z01.812 PRE-OPERATIVE LABORATORY EXAMINATION: ICD-10-CM

## 2019-09-10 LAB
ANION GAP SERPL CALC-SCNC: 8 MMOL/L (ref 0–11.9)
BUN SERPL-MCNC: 10 MG/DL (ref 8–22)
CALCIUM SERPL-MCNC: 9.5 MG/DL (ref 8.5–10.5)
CHLORIDE SERPL-SCNC: 99 MMOL/L (ref 96–112)
CO2 SERPL-SCNC: 29 MMOL/L (ref 20–33)
CREAT SERPL-MCNC: 0.83 MG/DL (ref 0.5–1.4)
GLUCOSE SERPL-MCNC: 223 MG/DL (ref 65–99)
POTASSIUM SERPL-SCNC: 3.8 MMOL/L (ref 3.6–5.5)
SODIUM SERPL-SCNC: 136 MMOL/L (ref 135–145)

## 2019-09-10 PROCEDURE — 80048 BASIC METABOLIC PNL TOTAL CA: CPT

## 2019-09-10 SDOH — HEALTH STABILITY: MENTAL HEALTH: HOW MANY STANDARD DRINKS CONTAINING ALCOHOL DO YOU HAVE ON A TYPICAL DAY?: 7 TO 9

## 2019-09-10 SDOH — HEALTH STABILITY: MENTAL HEALTH: HOW OFTEN DO YOU HAVE 6 OR MORE DRINKS ON ONE OCCASION?: WEEKLY

## 2019-09-10 NOTE — OR NURSING
Hx and meds reviewed, pre op instructions given.  Anesthesia fasting guidelines reviewed with pt.

## 2019-09-11 ENCOUNTER — ANESTHESIA EVENT (OUTPATIENT)
Dept: SURGERY | Facility: MEDICAL CENTER | Age: 69
End: 2019-09-11
Payer: MEDICARE

## 2019-09-11 ENCOUNTER — HOSPITAL ENCOUNTER (OUTPATIENT)
Facility: MEDICAL CENTER | Age: 69
End: 2019-09-11
Attending: ORTHOPAEDIC SURGERY | Admitting: ORTHOPAEDIC SURGERY
Payer: MEDICARE

## 2019-09-11 ENCOUNTER — ANESTHESIA (OUTPATIENT)
Dept: SURGERY | Facility: MEDICAL CENTER | Age: 69
End: 2019-09-11
Payer: MEDICARE

## 2019-09-11 VITALS
BODY MASS INDEX: 26.63 KG/M2 | HEART RATE: 65 BPM | OXYGEN SATURATION: 94 % | TEMPERATURE: 96.8 F | HEIGHT: 76 IN | DIASTOLIC BLOOD PRESSURE: 68 MMHG | SYSTOLIC BLOOD PRESSURE: 135 MMHG | RESPIRATION RATE: 18 BRPM | WEIGHT: 218.7 LBS

## 2019-09-11 DIAGNOSIS — G56.21 CUBITAL TUNNEL SYNDROME ON RIGHT: ICD-10-CM

## 2019-09-11 LAB
EST. AVERAGE GLUCOSE BLD GHB EST-MCNC: 131 MG/DL
GLUCOSE BLD-MCNC: 101 MG/DL (ref 65–99)
HBA1C MFR BLD: 6.2 % (ref 0–5.6)

## 2019-09-11 PROCEDURE — 160029 HCHG SURGERY MINUTES - 1ST 30 MINS LEVEL 4: Performed by: ORTHOPAEDIC SURGERY

## 2019-09-11 PROCEDURE — A6222 GAUZE <=16 IN NO W/SAL W/O B: HCPCS | Performed by: ORTHOPAEDIC SURGERY

## 2019-09-11 PROCEDURE — A6402 STERILE GAUZE <= 16 SQ IN: HCPCS | Performed by: ORTHOPAEDIC SURGERY

## 2019-09-11 PROCEDURE — 83036 HEMOGLOBIN GLYCOSYLATED A1C: CPT

## 2019-09-11 PROCEDURE — 160002 HCHG RECOVERY MINUTES (STAT): Performed by: ORTHOPAEDIC SURGERY

## 2019-09-11 PROCEDURE — 82962 GLUCOSE BLOOD TEST: CPT

## 2019-09-11 PROCEDURE — 502576 HCHG PACK, HAND: Performed by: ORTHOPAEDIC SURGERY

## 2019-09-11 PROCEDURE — 700102 HCHG RX REV CODE 250 W/ 637 OVERRIDE(OP): Performed by: ANESTHESIOLOGY

## 2019-09-11 PROCEDURE — 700101 HCHG RX REV CODE 250: Performed by: ORTHOPAEDIC SURGERY

## 2019-09-11 PROCEDURE — 160035 HCHG PACU - 1ST 60 MINS PHASE I: Performed by: ORTHOPAEDIC SURGERY

## 2019-09-11 PROCEDURE — 700111 HCHG RX REV CODE 636 W/ 250 OVERRIDE (IP): Performed by: ANESTHESIOLOGY

## 2019-09-11 PROCEDURE — 160048 HCHG OR STATISTICAL LEVEL 1-5: Performed by: ORTHOPAEDIC SURGERY

## 2019-09-11 PROCEDURE — A9270 NON-COVERED ITEM OR SERVICE: HCPCS | Performed by: ANESTHESIOLOGY

## 2019-09-11 PROCEDURE — 160036 HCHG PACU - EA ADDL 30 MINS PHASE I: Performed by: ORTHOPAEDIC SURGERY

## 2019-09-11 PROCEDURE — 160041 HCHG SURGERY MINUTES - EA ADDL 1 MIN LEVEL 4: Performed by: ORTHOPAEDIC SURGERY

## 2019-09-11 PROCEDURE — 160025 RECOVERY II MINUTES (STATS): Performed by: ORTHOPAEDIC SURGERY

## 2019-09-11 PROCEDURE — 500881 HCHG PACK, EXTREMITY: Performed by: ORTHOPAEDIC SURGERY

## 2019-09-11 PROCEDURE — 160046 HCHG PACU - 1ST 60 MINS PHASE II: Performed by: ORTHOPAEDIC SURGERY

## 2019-09-11 PROCEDURE — A6454 SELF-ADHER BAND W>=3" <5"/YD: HCPCS | Performed by: ORTHOPAEDIC SURGERY

## 2019-09-11 PROCEDURE — 700105 HCHG RX REV CODE 258: Performed by: ORTHOPAEDIC SURGERY

## 2019-09-11 PROCEDURE — 501838 HCHG SUTURE GENERAL: Performed by: ORTHOPAEDIC SURGERY

## 2019-09-11 PROCEDURE — 160009 HCHG ANES TIME/MIN: Performed by: ORTHOPAEDIC SURGERY

## 2019-09-11 PROCEDURE — 700101 HCHG RX REV CODE 250: Performed by: ANESTHESIOLOGY

## 2019-09-11 RX ORDER — HYDROMORPHONE HYDROCHLORIDE 1 MG/ML
0.2 INJECTION, SOLUTION INTRAMUSCULAR; INTRAVENOUS; SUBCUTANEOUS
Status: DISCONTINUED | OUTPATIENT
Start: 2019-09-11 | End: 2019-09-11 | Stop reason: HOSPADM

## 2019-09-11 RX ORDER — OXYCODONE HCL 5 MG/5 ML
5 SOLUTION, ORAL ORAL
Status: DISCONTINUED | OUTPATIENT
Start: 2019-09-11 | End: 2019-09-11 | Stop reason: HOSPADM

## 2019-09-11 RX ORDER — ACETAMINOPHEN 500 MG
TABLET ORAL
Status: DISCONTINUED
Start: 2019-09-11 | End: 2019-09-11 | Stop reason: HOSPADM

## 2019-09-11 RX ORDER — SODIUM CHLORIDE, SODIUM LACTATE, POTASSIUM CHLORIDE, CALCIUM CHLORIDE 600; 310; 30; 20 MG/100ML; MG/100ML; MG/100ML; MG/100ML
INJECTION, SOLUTION INTRAVENOUS CONTINUOUS
Status: DISCONTINUED | OUTPATIENT
Start: 2019-09-11 | End: 2019-09-11 | Stop reason: HOSPADM

## 2019-09-11 RX ORDER — MEPERIDINE HYDROCHLORIDE 25 MG/ML
12.5 INJECTION INTRAMUSCULAR; INTRAVENOUS; SUBCUTANEOUS
Status: DISCONTINUED | OUTPATIENT
Start: 2019-09-11 | End: 2019-09-11 | Stop reason: HOSPADM

## 2019-09-11 RX ORDER — HALOPERIDOL 5 MG/ML
1 INJECTION INTRAMUSCULAR
Status: DISCONTINUED | OUTPATIENT
Start: 2019-09-11 | End: 2019-09-11 | Stop reason: HOSPADM

## 2019-09-11 RX ORDER — ONDANSETRON 2 MG/ML
4 INJECTION INTRAMUSCULAR; INTRAVENOUS
Status: DISCONTINUED | OUTPATIENT
Start: 2019-09-11 | End: 2019-09-11 | Stop reason: HOSPADM

## 2019-09-11 RX ORDER — METOPROLOL TARTRATE 1 MG/ML
1 INJECTION, SOLUTION INTRAVENOUS
Status: DISCONTINUED | OUTPATIENT
Start: 2019-09-11 | End: 2019-09-11 | Stop reason: HOSPADM

## 2019-09-11 RX ORDER — HYDROMORPHONE HYDROCHLORIDE 1 MG/ML
0.1 INJECTION, SOLUTION INTRAMUSCULAR; INTRAVENOUS; SUBCUTANEOUS
Status: DISCONTINUED | OUTPATIENT
Start: 2019-09-11 | End: 2019-09-11 | Stop reason: HOSPADM

## 2019-09-11 RX ORDER — TRAMADOL HYDROCHLORIDE 50 MG/1
50 TABLET ORAL EVERY 4 HOURS PRN
Qty: 27 TAB | Refills: 0 | Status: SHIPPED | OUTPATIENT
Start: 2019-09-11 | End: 2019-09-18

## 2019-09-11 RX ORDER — KETOROLAC TROMETHAMINE 30 MG/ML
INJECTION, SOLUTION INTRAMUSCULAR; INTRAVENOUS PRN
Status: DISCONTINUED | OUTPATIENT
Start: 2019-09-11 | End: 2019-09-11 | Stop reason: SURG

## 2019-09-11 RX ORDER — HYDRALAZINE HYDROCHLORIDE 20 MG/ML
5 INJECTION INTRAMUSCULAR; INTRAVENOUS
Status: DISCONTINUED | OUTPATIENT
Start: 2019-09-11 | End: 2019-09-11 | Stop reason: HOSPADM

## 2019-09-11 RX ORDER — LIDOCAINE HYDROCHLORIDE AND EPINEPHRINE 10; 10 MG/ML; UG/ML
INJECTION, SOLUTION INFILTRATION; PERINEURAL
Status: DISCONTINUED | OUTPATIENT
Start: 2019-09-11 | End: 2019-09-11 | Stop reason: HOSPADM

## 2019-09-11 RX ORDER — OXYCODONE HCL 5 MG/5 ML
10 SOLUTION, ORAL ORAL
Status: DISCONTINUED | OUTPATIENT
Start: 2019-09-11 | End: 2019-09-11 | Stop reason: HOSPADM

## 2019-09-11 RX ORDER — LABETALOL HYDROCHLORIDE 5 MG/ML
5 INJECTION, SOLUTION INTRAVENOUS
Status: DISCONTINUED | OUTPATIENT
Start: 2019-09-11 | End: 2019-09-11 | Stop reason: HOSPADM

## 2019-09-11 RX ORDER — MIDAZOLAM HYDROCHLORIDE 1 MG/ML
1 INJECTION INTRAMUSCULAR; INTRAVENOUS
Status: DISCONTINUED | OUTPATIENT
Start: 2019-09-11 | End: 2019-09-11 | Stop reason: HOSPADM

## 2019-09-11 RX ORDER — MIDAZOLAM HYDROCHLORIDE 1 MG/ML
INJECTION INTRAMUSCULAR; INTRAVENOUS PRN
Status: DISCONTINUED | OUTPATIENT
Start: 2019-09-11 | End: 2019-09-11 | Stop reason: SURG

## 2019-09-11 RX ORDER — ACETAMINOPHEN 500 MG
1000 TABLET ORAL ONCE
Status: COMPLETED | OUTPATIENT
Start: 2019-09-11 | End: 2019-09-11

## 2019-09-11 RX ORDER — BUPIVACAINE HYDROCHLORIDE AND EPINEPHRINE 5; 5 MG/ML; UG/ML
INJECTION, SOLUTION EPIDURAL; INTRACAUDAL; PERINEURAL
Status: DISCONTINUED | OUTPATIENT
Start: 2019-09-11 | End: 2019-09-11 | Stop reason: HOSPADM

## 2019-09-11 RX ORDER — HYDROMORPHONE HYDROCHLORIDE 1 MG/ML
0.4 INJECTION, SOLUTION INTRAMUSCULAR; INTRAVENOUS; SUBCUTANEOUS
Status: DISCONTINUED | OUTPATIENT
Start: 2019-09-11 | End: 2019-09-11 | Stop reason: HOSPADM

## 2019-09-11 RX ORDER — ONDANSETRON 2 MG/ML
INJECTION INTRAMUSCULAR; INTRAVENOUS PRN
Status: DISCONTINUED | OUTPATIENT
Start: 2019-09-11 | End: 2019-09-11 | Stop reason: SURG

## 2019-09-11 RX ORDER — LIDOCAINE HYDROCHLORIDE 20 MG/ML
INJECTION, SOLUTION EPIDURAL; INFILTRATION; INTRACAUDAL; PERINEURAL PRN
Status: DISCONTINUED | OUTPATIENT
Start: 2019-09-11 | End: 2019-09-11 | Stop reason: SURG

## 2019-09-11 RX ORDER — CEFAZOLIN SODIUM 1 G/3ML
INJECTION, POWDER, FOR SOLUTION INTRAMUSCULAR; INTRAVENOUS PRN
Status: DISCONTINUED | OUTPATIENT
Start: 2019-09-11 | End: 2019-09-11 | Stop reason: SURG

## 2019-09-11 RX ORDER — DIPHENHYDRAMINE HYDROCHLORIDE 50 MG/ML
12.5 INJECTION INTRAMUSCULAR; INTRAVENOUS
Status: DISCONTINUED | OUTPATIENT
Start: 2019-09-11 | End: 2019-09-11 | Stop reason: HOSPADM

## 2019-09-11 RX ADMIN — FENTANYL CITRATE 50 MCG: 50 INJECTION, SOLUTION INTRAMUSCULAR; INTRAVENOUS at 14:19

## 2019-09-11 RX ADMIN — FENTANYL CITRATE 50 MCG: 50 INJECTION, SOLUTION INTRAMUSCULAR; INTRAVENOUS at 14:08

## 2019-09-11 RX ADMIN — LIDOCAINE HYDROCHLORIDE 0.5 ML: 10 INJECTION, SOLUTION INFILTRATION; PERINEURAL at 11:50

## 2019-09-11 RX ADMIN — KETOROLAC TROMETHAMINE 30 MG: 30 INJECTION, SOLUTION INTRAMUSCULAR at 14:36

## 2019-09-11 RX ADMIN — ACETAMINOPHEN 1000 MG: 500 TABLET, FILM COATED ORAL at 11:41

## 2019-09-11 RX ADMIN — EPHEDRINE SULFATE 10 MG: 50 INJECTION INTRAMUSCULAR; INTRAVENOUS; SUBCUTANEOUS at 14:35

## 2019-09-11 RX ADMIN — SODIUM CHLORIDE, POTASSIUM CHLORIDE, SODIUM LACTATE AND CALCIUM CHLORIDE: 600; 310; 30; 20 INJECTION, SOLUTION INTRAVENOUS at 11:50

## 2019-09-11 RX ADMIN — PROPOFOL 140 MG: 10 INJECTION, EMULSION INTRAVENOUS at 14:08

## 2019-09-11 RX ADMIN — ONDANSETRON 4 MG: 2 INJECTION INTRAMUSCULAR; INTRAVENOUS at 14:33

## 2019-09-11 RX ADMIN — SODIUM CHLORIDE, POTASSIUM CHLORIDE, SODIUM LACTATE AND CALCIUM CHLORIDE: 600; 310; 30; 20 INJECTION, SOLUTION INTRAVENOUS at 14:03

## 2019-09-11 RX ADMIN — LIDOCAINE HYDROCHLORIDE 100 MG: 20 INJECTION, SOLUTION EPIDURAL; INFILTRATION; INTRACAUDAL; PERINEURAL at 14:08

## 2019-09-11 RX ADMIN — CEFAZOLIN 2 G: 1 INJECTION, POWDER, FOR SOLUTION INTRAVENOUS at 14:14

## 2019-09-11 RX ADMIN — MIDAZOLAM HYDROCHLORIDE 2 MG: 1 INJECTION, SOLUTION INTRAMUSCULAR; INTRAVENOUS at 14:03

## 2019-09-11 ASSESSMENT — PAIN SCALES - GENERAL: PAIN_LEVEL: 0

## 2019-09-11 NOTE — ANESTHESIA PREPROCEDURE EVALUATION
Relevant Problems   CARDIAC   (+) Atherosclerosis of abdominal aorta (HCC)   (+) HTN (hypertension), benign      ENDO   (+) Controlled type 2 diabetes mellitus, without long-term current use of insulin (HCC)       Physical Exam    Airway   Mallampati: II  TM distance: >3 FB  Neck ROM: full       Cardiovascular - normal exam  Rhythm: regular  Rate: normal  (-) murmur     Dental - normal exam         Pulmonary - normal exam  Breath sounds clear to auscultation     Abdominal    Neurological - normal exam                 Anesthesia Plan    ASA 2       Plan - general       Airway plan will be LMA        Induction: intravenous    Postoperative Plan: Postoperative administration of opioids is intended.    Pertinent diagnostic labs and testing reviewed    Informed Consent:    Anesthetic plan and risks discussed with patient.    Use of blood products discussed with: patient whom consented to blood products.

## 2019-09-11 NOTE — OR NURSING
1443 To PACU from OR via gurney, side rails up x 2 for safety, lungs clear bilaterally, scds on patient and machine operational, dressing to R elbow and dressing to R wrist CDI. RUE elevated on 2 pillows above heart level with ice pack to R elbow. Pt breathing easy and unlabored with OPA in place. R hand/fingers pink/warm with <3 sec cap refill. Pt does not arouse to voice or touch.   1455 No changes  1510 Pt awake and reports no pain or nausea. Remains awake without stimulation. Pt demonstrates ability to move fingers to R hand.   1523 [facility] pharmacy called by this RN; called in Tramadol 50mg take one tab by mouth every 4 hours as needed for up to 7 days. # 27 with NRF written by Dr Lopez.   1540 Pt remains awake and denies pain or nausea. Pt states ready for discharge.

## 2019-09-11 NOTE — ANESTHESIA TIME REPORT
Anesthesia Start and Stop Event Times     Date Time Event    9/11/2019 1351 Ready for Procedure     1402 Anesthesia Start     1446 Anesthesia Stop        Responsible Staff  09/11/19    Name Role Begin End    Marc Krueger M.D. Anesth 1402 1446        Preop Diagnosis (Free Text):  Pre-op Diagnosis     CARPAL TUNNEL SYNDROME RIGHT        Preop Diagnosis (Codes):    Post op Diagnosis  Carpal tunnel syndrome of right wrist  Right cubital tunnel syndrome    Premium Reason  Non-Premium    Comments:

## 2019-09-11 NOTE — ANESTHESIA POSTPROCEDURE EVALUATION
Patient: Matthew Perez    Procedure Summary     Date:  09/11/19 Room / Location:   OR 02 / SURGERY [facility]    Anesthesia Start:  1402 Anesthesia Stop:  1446    Procedures:       RELEASE, CARPAL TUNNEL, ENDOSCOPIC (Right )      RELEASE, CUBITAL TUNNEL Diagnosis:  (CARPAL TUNNEL SYNDROME RIGHT)    Surgeon:  Titus Lopez M.D. Responsible Provider:  Marc Krueger M.D.    Anesthesia Type:  general ASA Status:  2          Final Anesthesia Type: general  Last vitals  BP   Blood Pressure : 136/87    Temp   36.7 °C (98.1 °F)    Pulse   Pulse: 67, Heart Rate (Monitored): 73   Resp   14    SpO2   94 %      Anesthesia Post Evaluation    Patient location during evaluation: PACU  Patient participation: complete - patient participated  Level of consciousness: awake and alert  Pain score: 0    Airway patency: patent  Anesthetic complications: no  Cardiovascular status: hemodynamically stable  Respiratory status: acceptable  Hydration status: euvolemic    PONV: none           Nurse Pain Score: 0 (NPRS)

## 2019-09-11 NOTE — OR NURSING
Into pre op, educated on pre op procedures and schedule for this day's event.   1215 tolerated all pre op events without incident. Spouse remains with pt.

## 2019-09-11 NOTE — OP REPORT
DATE OF SURGERY:  9/11/2019     PREOPERATIVE DIAGNOSIS:  Right cubital tunnel syndrome.     POSTOPERATIVE DIAGNOSIS:  Right cubital tunnel syndrome..     SURGERY PERFORMED:  Right cubital tunnel release.     SURGEON:  Titus Lopez MD     ANESTHESIA: General.     ASSISTANT:  None     COMPLICATIONS:  None.     TOURNIQUET TIME:  9/11/2019.     TOURNIQUET PRESSURE:  250 mmHg.    INDICATIONS FOR PROCEDURE:  This is a pleasant patient who has had    persistent right carpal and cubital tunnel syndromes.  He has tried nonoperative    modalities including activity modifications, NSAIDs, and these have    not resolved the symptoms.  After electrodiagnostic studies indicated right  carpal and cubital tunnel syndromes with evidence of compression at the wrist and elbow, the decision   was made to take them to the operative room for the above-mentioned procedure.     DESCRIPTION OF PROCEDURE:  On the day of surgery the patient was seen in the   preoperative area where informed consent was obtained with all risks and    benefits of the procedure explained and all questions answered.  He wished to   proceed with the surgery.  The proper site was marked. The patient was subsequently    taken to the operative room and placed in the supine position with all bony    prominences well padded.  A tourniquet was placed on the left upper    extremity.  It was then prepped and draped in the usual sterile fashion.     A timeout was performed with all persons and attendants agreeing on the proper   patient, proper surgical site, and proper surgery to be performed.     An esmarch was used to exsanguinate the right extremity and the tourniquet was insuflated to 250 mmHg.    A transverse incision was made at the distal wrist crease from the ulnar border of the    palmaris longus ulnarly.  This was roughly 1 cm in length.  Sharp and blunt    dissection was taken down to the level of the forearm fascia.  Skin hooks were   then placed  proximally and distally to help retract soft tissues.  The    forearm fascia was visualized and this was incised with a Tanacross blade.  The    forearm fascia was then retracted distally with a double skin hook.  The    Spatula provided in the Arthrex Centerline Endoscope tray was then placed deep to the   transverse carpal ligament and the canal was prepped for the endoscope by placing    increasingly larger trocars x2.  Next, the endoscope was placed deep to the    transverse carpal ligament, which was visualized clearly.  The distal aspect    of the transverse carpal ligament was then incised and this was visualized to    ensure complete transection distally.  Once this was ensured, the blade was    then maintained elevated and the endoscope was brought proximally across the  transverse carpal ligament.  The entirety of the transverse carpal ligament was incised and the endoscope was removed.     The wound was irrigated with normal saline and closed with 4-0 nylon in a    horizontal mattress fashion.  The wound was then dressed with Xeroform, 4x4 gauze and a tegaderm dressing.     A standard incision over the ulnar nerve between the medial epicondyle and the olecranon was made, roughly 6 cm in length.  Sharp and blunt dissection was taken down to the level of the cubital tunnel.  Bleeding was well controlled with bovie electrocautery.  The Serra's ligament was visualized and sharply incised in line with the nerve using a Tanacross blade.  Tenotomy scissors were then used to compete the release.  This was carried proximally to the Center Line of Struther's and distally to the flexor carpi ulnaris.  The FCU fascia was then incised and care was taken to protect the branches of the ulnar nerve to the FCU.  The intermuscular septum was then incised just proximal to the medial epicondyle.  The elbow was then maximally flexed and the nerve did not subluxate, therefore a transposition was not performed.  The wound was irrigated  with normal saline and wounds were closed with monocryl in the subcutaneous tissues and staples for the skin.  The wound was dressed with xeroform, 4x4's, tegaderm and a loosely approximated ACE wrap.    DISPOSITION:  Plan for discharge to home on a regular diet.  Weightbearing  as tolerated to the operative extremity with the exception of a 10-pound    lifting restriction.  Bandages may be removed after 72 hours.  At that time may   begin showering and bathing as normal, but should not soak the wound.    Return to clinic in 10-14 days.  A prescription for Norco 5/325 mg 1-2 tab  PO q4 hours p.r.n. pain.

## 2019-09-11 NOTE — ANESTHESIA PROCEDURE NOTES
Airway  Date/Time: 9/11/2019 2:10 PM  Performed by: Marc Krueger M.D.  Authorized by: Marc Krueger M.D.     Location:  OR  Urgency:  Elective  Indications for Airway Management:  Anesthesia  Spontaneous Ventilation: absent    Sedation Level:  Deep  Preoxygenated: Yes    Mask Difficulty Assessment:  1 - vent by mask  Final Airway Type:  Supraglottic airway  Final Supraglottic Airway:  Standard LMA  SGA Size:  5  Number of Attempts at Approach:  1

## 2019-09-11 NOTE — DISCHARGE INSTRUCTIONS
ACTIVITY: Rest and take it easy for the first 24 hours.  A responsible adult is recommended to remain with you during that time.  It is normal to feel sleepy.  We encourage you to not do anything that requires balance, judgment or coordination.    MILD FLU-LIKE SYMPTOMS ARE NORMAL. YOU MAY EXPERIENCE GENERALIZED MUSCLE ACHES, THROAT IRRITATION, HEADACHE AND/OR SOME NAUSEA.    FOR 24 HOURS DO NOT:  Drive, operate machinery or run household appliances.  Drink beer or alcoholic beverages.   Make important decisions or sign legal documents.    SPECIAL INSTRUCTIONS:  Keep dressings clean, dry and intact   No lifting anything heavier than 10 lbs with the operative hand   May remove dressings after 48 hrs   May begin showering and washing hands as normal after 48 hrs   Do not soak the wound   Keep hand elevated and apply ice as much as possible in the first three days   Do not operate a vehicle or machinery while taking narcotic pain medications   Return to clinic in 10-14 days   Please call the office with any questions [phone]    DIET: To avoid nausea, slowly advance diet as tolerated, avoiding spicy or greasy foods for the first day.  Add more substantial food to your diet according to your physician's instructions. INCREASE FLUIDS AND FIBER TO AVOID CONSTIPATION.    FOLLOW-UP APPOINTMENT:  A follow-up appointment should be arranged with your doctor in office as instructed; call to schedule.    You should CALL YOUR PHYSICIAN if you develop:  Fever greater than 101 degrees F.  Pain not relieved by medication, or persistent nausea or vomiting.  Excessive bleeding (blood soaking through dressing) or unexpected drainage from the wound.  Extreme redness or swelling around the incision site, drainage of pus or foul smelling drainage.  Inability to urinate or empty your bladder within 8 hours.  Problems with breathing or chest pain.    You should call 911 if you develop problems with breathing or chest pain.  If you are  unable to contact your doctor or surgical center, you should go to the nearest emergency room or urgent care center.  Dr Lopez's telephone #: [phone]    If any questions arise, call your doctor.  If your doctor is not available, please feel free to call the Surgical Center at [phone].  The Center is open Monday through Friday from 7AM to 7PM.  You can also call the HEALTH HOTLINE open 24 hours/day, 7 days/week and speak to a nurse at [phone], or toll free at [phone].    A registered nurse may call you a few days after your surgery to see how you are doing after your procedure.    MEDICATIONS: Resume taking daily medication.  Take prescribed pain medication with food.  If no medication is prescribed, you may take non-aspirin pain medication if needed.  PAIN MEDICATION CAN BE VERY CONSTIPATING.  Take a stool softener or laxative such as senokot, pericolace, or milk of magnesia if needed.    [facility] at 3:25pm.  Last pain medication given at none.    If your physician has prescribed pain medication that includes Acetaminophen (Tylenol), do not take additional Acetaminophen (Tylenol) while taking the prescribed medication.    Depression / Suicide Risk    As you are discharged from this [facility] facility, it is important to learn how to keep safe from harming yourself.    Recognize the warning signs:  · Abrupt changes in personality, positive or negative- including increase in energy   · Giving away possessions  · Change in eating patterns- significant weight changes-  positive or negative  · Change in sleeping patterns- unable to sleep or sleeping all the time   · Unwillingness or inability to communicate  · Depression  · Unusual sadness, discouragement and loneliness  · Talk of wanting to die  · Neglect of personal appearance   · Rebelliousness- reckless behavior  · Withdrawal from people/activities they love  · Confusion- inability  to concentrate     If you or a loved one observes any of these behaviors or has concerns about self-harm, here's what you can do:  · Talk about it- your feelings and reasons for harming yourself  · Remove any means that you might use to hurt yourself (examples: pills, rope, extension cords, firearm)  · Get professional help from the community (Mental Health, Substance Abuse, psychological counseling)  · Do not be alone:Call your Safe Contact- someone whom you trust who will be there for you.  · Call your local CRISIS HOTLINE 743-8434 or 292-119-7294  · Call your local Children's Mobile Crisis Response Team [place] (598) 354-6536 or www.[place]  · Call the toll free National Suicide Prevention Hotlines   · National Suicide Prevention Lifeline 189-196-EXDL (8999)  · National Hope Line Network 800-SUICIDE (138-5385)

## 2019-09-11 NOTE — ANESTHESIA QCDR
2019 [facility] Clinical Data Registry (for Quality Improvement)     Postoperative nausea/vomiting risk protocol (Adult = 18 yrs and Pediatric 3-17 yrs)- (430 and 463)  General inhalation anesthetic (NOT TIVA) with PONV risk factors: No  Provision of anti-emetic therapy with at least 2 different classes of agents: N/A  Patient DID NOT receive anti-emetic therapy and reason is documented in Medical Record: N/A    Multimodal Pain Management- (AQI59)  Patient undergoing Elective Surgery (i.e. Outpatient, or ASC, or Prescheduled Surgery prior to Hospital Admission): Yes  Use of Multimodal Pain Management, two or more drugs and/or interventions, NOT including systemic opioids: Yes   Exception: Documented allergy to multiple classes of analgesics:  N/A    PACU assessment of acute postoperative pain prior to Anesthesia Care End- Applies to Patients Age = 18- (ABG7)  Initial PACU pain score is which of the following: < 7/10  Patient unable to report pain score: N/A    Post-anesthetic transfer of care checklist/protocol to PACU/ICU- (426 and 427)  Upon conclusion of case, patient transferred to which of the following locations: PACU/Non-ICU  Use of transfer checklist/protocol: Yes  Exclusion: Service Performed in Patient Hospital Room (and thus did not require transfer): N/A    PACU Reintubation- (AQI31)  General anesthesia requiring endotracheal intubation (ETT) along with subsequent extubation in OR or PACU: No  Required reintubation in the PACU: N/A  Extubation was a planned trial documented in the medical record prior to removal of the original airway device: N/A    Unplanned admission to ICU related to anesthesia service up through end of PACU care- (MD51)  Unplanned admission to ICU (not initially anticipated at anesthesia start time): No

## 2019-09-12 NOTE — OR NURSING
1545- Patient to stage II from PACU. No distress noted by patient at this time. Patient denies any pain or discomfort at this time. Patient assisted with dressing by nursing assistant.     1555- Patient with wife resting at chairside.     1610- Discharge paperwork for home discussed with patient and family with each stating that discharge instructions for home understood.     1625- Patient discharged to awaiting vehicle via wheelchair.

## 2019-10-05 ENCOUNTER — IMMUNIZATION (OUTPATIENT)
Dept: SOCIAL WORK | Facility: CLINIC | Age: 69
End: 2019-10-05
Payer: MEDICARE

## 2019-10-05 DIAGNOSIS — Z23 NEED FOR VACCINATION: ICD-10-CM

## 2019-10-05 PROCEDURE — 90662 IIV NO PRSV INCREASED AG IM: CPT | Performed by: REGISTERED NURSE

## 2019-10-05 PROCEDURE — G0008 ADMIN INFLUENZA VIRUS VAC: HCPCS | Performed by: REGISTERED NURSE

## 2019-10-14 ENCOUNTER — OFFICE VISIT (OUTPATIENT)
Dept: MEDICAL GROUP | Facility: MEDICAL CENTER | Age: 69
End: 2019-10-14
Payer: MEDICARE

## 2019-10-14 VITALS
HEART RATE: 65 BPM | WEIGHT: 231 LBS | SYSTOLIC BLOOD PRESSURE: 138 MMHG | OXYGEN SATURATION: 97 % | HEIGHT: 76 IN | TEMPERATURE: 97 F | BODY MASS INDEX: 28.13 KG/M2 | DIASTOLIC BLOOD PRESSURE: 72 MMHG

## 2019-10-14 DIAGNOSIS — E11.69 CONTROLLED TYPE 2 DIABETES MELLITUS WITH OTHER SPECIFIED COMPLICATION, WITHOUT LONG-TERM CURRENT USE OF INSULIN (HCC): ICD-10-CM

## 2019-10-14 DIAGNOSIS — E78.5 DYSLIPIDEMIA: ICD-10-CM

## 2019-10-14 DIAGNOSIS — Z12.5 SCREENING PSA (PROSTATE SPECIFIC ANTIGEN): ICD-10-CM

## 2019-10-14 DIAGNOSIS — I10 HTN (HYPERTENSION), BENIGN: Chronic | ICD-10-CM

## 2019-10-14 DIAGNOSIS — Z11.59 NEED FOR HEPATITIS C SCREENING TEST: ICD-10-CM

## 2019-10-14 PROCEDURE — 99214 OFFICE O/P EST MOD 30 MIN: CPT | Performed by: INTERNAL MEDICINE

## 2019-10-14 NOTE — PROGRESS NOTES
CC: Follow-up diabetes, hypertension.                                                                                                                                      HPI:   Matthew presents today with the following.    1. Controlled type 2 diabetes mellitus with other specified complication, without long-term current use of insulin ([facility])  Presents A1c checked approximately 1 month ago preop found to be 6.2.  Anemia on current regimen.    2. Dyslipidemia  Cholesterol well controlled last check he is on statin no myalgias will be due for blood work before next visit    3. HTN (hypertension), benign  Slightly elevated today reports decreased activity he is planning getting back to the gym.        Patient Active Problem List    Diagnosis Date Noted   • Diabetic retinopathy ([facility]) 05/30/2015     Priority: High   • Cubital tunnel syndrome on right 09/11/2019   • Atherosclerosis of abdominal aorta ([facility]) 04/04/2019   • Dyslipidemia 10/24/2017   • Controlled type 2 diabetes mellitus, without long-term current use of insulin ([facility]) 04/06/2017   • Constipation 04/06/2017   • Cataract, diabetic ([facility]) 04/02/2015   • Primary localized osteoarthrosis, pelvic region and thigh 10/21/2014   • HTN (hypertension), benign 03/22/2010       Current Outpatient Medications   Medication Sig Dispense Refill   • lisinopril (PRINIVIL, ZESTRIL) 40 MG tablet Take 1 Tab by mouth every day. 100 Tab 3   • hydroCHLOROthiazide (HYDRODIURIL) 25 MG Tab Take 1 Tab by mouth every day. 100 Tab 3   • metFORMIN (GLUCOPHAGE) 500 MG Tab Take 1 Tab by mouth 2 times a day, with meals. 200 Tab 3   • atorvastatin (LIPITOR) 40 MG Tab Take 1 Tab by mouth every day. 100 Tab 3   • aspirin (ASA) 325 MG TABS Take 325 mg by mouth 2 Times a Day.     • Coenzyme Q10 (CO Q 10 PO) Take  by mouth 2 Times a Day.     • therapeutic multivitamin-minerals (THERAGRAN-M) TABS Take 1 Tab by mouth every morning.       No current facility-administered medications for this  "visit.          Allergies as of 10/14/2019   • (No Known Allergies)        ROS: Denies Chest pain, SOB, LE edema.    /72 (BP Location: Left arm, Patient Position: Sitting)   Pulse 65   Temp 36.1 °C (97 °F)   Ht 1.93 m (6' 4\")   Wt 104.8 kg (231 lb)   SpO2 97%   BMI 28.12 kg/m²     Physical Exam:  Gen:         Alert and oriented, No apparent distress.  Neck:        No Lymphadenopathy or Bruits.  Lungs:     Clear to auscultation bilaterally  CV:          Regular rate and rhythm. No murmurs, rubs or gallops.               Ext:          No clubbing, cyanosis, edema.      Assessment and Plan.   69 y.o. male with the following issues.    1. Controlled type 2 diabetes mellitus with other specified complication, without long-term current use of insulin (HCC)  Currently well controlled no changes. Discussed diet and exercise recheck A1c in 6 months. Reminded about yearly eye exam.  - HEMOGLOBIN A1C; Future  - Diabetic Monofilament Lower Extremity Exam    2. Dyslipidemia  Recheck cholesterol  - Comp Metabolic Panel; Future  - Lipid Profile; Future    3. HTN (hypertension), benign  Monitor blood pressures follow-up in 6 months    4. Need for hepatitis C screening test    - HEP C VIRUS ANTIBODY; Future    5. Screening PSA (prostate specific antigen)    - PROSTATE SPECIFIC AG SCREENING; Future      "

## 2020-01-24 ENCOUNTER — PATIENT OUTREACH (OUTPATIENT)
Dept: HEALTH INFORMATION MANAGEMENT | Facility: OTHER | Age: 70
End: 2020-01-24

## 2020-01-24 NOTE — PROGRESS NOTES
Outcome: Left Message PA Intro & birthday call    Please transfer to Patient Outreach Team at [phone] when patient returns call.    [facility] Verified: yes    Attempt # 1

## 2020-06-11 ENCOUNTER — HOSPITAL ENCOUNTER (OUTPATIENT)
Facility: MEDICAL CENTER | Age: 70
End: 2020-06-11
Payer: MEDICARE

## 2020-06-11 PROCEDURE — 82274 ASSAY TEST FOR BLOOD FECAL: CPT

## 2020-06-19 LAB — HEMOCCULT STL QL IA: NEGATIVE

## 2020-07-16 ENCOUNTER — HOSPITAL ENCOUNTER (OUTPATIENT)
Dept: LAB | Facility: MEDICAL CENTER | Age: 70
End: 2020-07-16
Attending: INTERNAL MEDICINE
Payer: MEDICARE

## 2020-07-16 DIAGNOSIS — E78.5 DYSLIPIDEMIA: ICD-10-CM

## 2020-07-16 DIAGNOSIS — Z12.5 SCREENING PSA (PROSTATE SPECIFIC ANTIGEN): ICD-10-CM

## 2020-07-16 DIAGNOSIS — Z11.59 NEED FOR HEPATITIS C SCREENING TEST: ICD-10-CM

## 2020-07-16 DIAGNOSIS — E11.69 CONTROLLED TYPE 2 DIABETES MELLITUS WITH OTHER SPECIFIED COMPLICATION, WITHOUT LONG-TERM CURRENT USE OF INSULIN (HCC): ICD-10-CM

## 2020-07-16 LAB
ALBUMIN SERPL BCP-MCNC: 4.2 G/DL (ref 3.2–4.9)
ALBUMIN/GLOB SERPL: 1.8 G/DL
ALP SERPL-CCNC: 88 U/L (ref 30–99)
ALT SERPL-CCNC: 23 U/L (ref 2–50)
ANION GAP SERPL CALC-SCNC: 16 MMOL/L (ref 7–16)
AST SERPL-CCNC: 20 U/L (ref 12–45)
BILIRUB SERPL-MCNC: 0.5 MG/DL (ref 0.1–1.5)
BUN SERPL-MCNC: 12 MG/DL (ref 8–22)
CALCIUM SERPL-MCNC: 9.5 MG/DL (ref 8.5–10.5)
CHLORIDE SERPL-SCNC: 98 MMOL/L (ref 96–112)
CHOLEST SERPL-MCNC: 93 MG/DL (ref 100–199)
CO2 SERPL-SCNC: 23 MMOL/L (ref 20–33)
CREAT SERPL-MCNC: 0.69 MG/DL (ref 0.5–1.4)
EST. AVERAGE GLUCOSE BLD GHB EST-MCNC: 134 MG/DL
FASTING STATUS PATIENT QL REPORTED: NORMAL
GLOBULIN SER CALC-MCNC: 2.3 G/DL (ref 1.9–3.5)
GLUCOSE SERPL-MCNC: 81 MG/DL (ref 65–99)
HBA1C MFR BLD: 6.3 % (ref 0–5.6)
HCV AB SER QL: NORMAL
HDLC SERPL-MCNC: 29 MG/DL
LDLC SERPL CALC-MCNC: 51 MG/DL
POTASSIUM SERPL-SCNC: 3.6 MMOL/L (ref 3.6–5.5)
PROT SERPL-MCNC: 6.5 G/DL (ref 6–8.2)
PSA SERPL-MCNC: 2.21 NG/ML (ref 0–4)
SODIUM SERPL-SCNC: 137 MMOL/L (ref 135–145)
TRIGL SERPL-MCNC: 66 MG/DL (ref 0–149)

## 2020-07-16 PROCEDURE — 80061 LIPID PANEL: CPT

## 2020-07-16 PROCEDURE — 36415 COLL VENOUS BLD VENIPUNCTURE: CPT

## 2020-07-16 PROCEDURE — 84153 ASSAY OF PSA TOTAL: CPT

## 2020-07-16 PROCEDURE — 86803 HEPATITIS C AB TEST: CPT

## 2020-07-16 PROCEDURE — 83036 HEMOGLOBIN GLYCOSYLATED A1C: CPT

## 2020-07-16 PROCEDURE — 80053 COMPREHEN METABOLIC PANEL: CPT

## 2020-07-22 ENCOUNTER — OFFICE VISIT (OUTPATIENT)
Dept: MEDICAL GROUP | Facility: MEDICAL CENTER | Age: 70
End: 2020-07-22
Payer: MEDICARE

## 2020-07-22 VITALS
BODY MASS INDEX: 27.89 KG/M2 | HEART RATE: 102 BPM | HEIGHT: 76 IN | WEIGHT: 229.06 LBS | SYSTOLIC BLOOD PRESSURE: 126 MMHG | TEMPERATURE: 97.4 F | OXYGEN SATURATION: 96 % | DIASTOLIC BLOOD PRESSURE: 72 MMHG

## 2020-07-22 DIAGNOSIS — E11.3299 MILD NONPROLIFERATIVE DIABETIC RETINOPATHY WITHOUT MACULAR EDEMA ASSOCIATED WITH TYPE 2 DIABETES MELLITUS, UNSPECIFIED LATERALITY (HCC): ICD-10-CM

## 2020-07-22 DIAGNOSIS — E78.5 DYSLIPIDEMIA: ICD-10-CM

## 2020-07-22 DIAGNOSIS — I10 HTN (HYPERTENSION), BENIGN: Chronic | ICD-10-CM

## 2020-07-22 DIAGNOSIS — E11.69 CONTROLLED TYPE 2 DIABETES MELLITUS WITH OTHER SPECIFIED COMPLICATION, WITHOUT LONG-TERM CURRENT USE OF INSULIN (HCC): ICD-10-CM

## 2020-07-22 DIAGNOSIS — I70.0 ATHEROSCLEROSIS OF ABDOMINAL AORTA (HCC): ICD-10-CM

## 2020-07-22 DIAGNOSIS — Z00.00 MEDICARE ANNUAL WELLNESS VISIT, SUBSEQUENT: ICD-10-CM

## 2020-07-22 DIAGNOSIS — E11.36 CATARACT, DIABETIC (HCC): Chronic | ICD-10-CM

## 2020-07-22 PROBLEM — K59.00 CONSTIPATION: Status: RESOLVED | Noted: 2017-04-06 | Resolved: 2020-07-22

## 2020-07-22 PROCEDURE — G0439 PPPS, SUBSEQ VISIT: HCPCS | Performed by: INTERNAL MEDICINE

## 2020-07-22 PROCEDURE — 99214 OFFICE O/P EST MOD 30 MIN: CPT | Mod: 25 | Performed by: INTERNAL MEDICINE

## 2020-07-22 PROCEDURE — 8041 PR SCP AHA: Performed by: INTERNAL MEDICINE

## 2020-07-22 RX ORDER — ATORVASTATIN CALCIUM 40 MG/1
40 TABLET, FILM COATED ORAL DAILY
Qty: 100 TAB | Refills: 3 | Status: SHIPPED | OUTPATIENT
Start: 2020-07-22 | End: 2021-04-20 | Stop reason: SDUPTHER

## 2020-07-22 RX ORDER — HYDROCHLOROTHIAZIDE 25 MG/1
25 TABLET ORAL DAILY
Qty: 100 TAB | Refills: 3 | Status: SHIPPED | OUTPATIENT
Start: 2020-07-22 | End: 2021-04-20 | Stop reason: SDUPTHER

## 2020-07-22 RX ORDER — LISINOPRIL 40 MG/1
40 TABLET ORAL DAILY
Qty: 100 TAB | Refills: 3 | Status: SHIPPED | OUTPATIENT
Start: 2020-07-22 | End: 2021-04-20 | Stop reason: SDUPTHER

## 2020-07-22 ASSESSMENT — ENCOUNTER SYMPTOMS: GENERAL WELL-BEING: EXCELLENT

## 2020-07-22 ASSESSMENT — ACTIVITIES OF DAILY LIVING (ADL): BATHING_REQUIRES_ASSISTANCE: 0

## 2020-07-22 ASSESSMENT — PATIENT HEALTH QUESTIONNAIRE - PHQ9: CLINICAL INTERPRETATION OF PHQ2 SCORE: 0

## 2020-07-22 NOTE — PROGRESS NOTES
Annual Health Assessment Questions:    1.  Are you currently engaging in any exercise or physical activity? Yes    2.  How would you describe your mood or emotional well-being today? good    3.  Have you had any falls in the last year? No    4.  Have you noticed any problems with your balance or had difficulty walking? No    5.  In the last six months have you experienced any leakage of urine? No    6. DPA/Advanced Directive: Patient has Durable Power of  on file.

## 2020-07-22 NOTE — PROGRESS NOTES
Annual Health Assessment Questions:    1.  Are you currently engaging in any exercise or physical activity? Yes    2.  How would you describe your mood or emotional well-being today? good    3.  Have you had any falls in the last year? No    4.  Have you noticed any problems with your balance or had difficulty walking? No    5.  In the last six months have you experienced any leakage of urine? No    6. DPA/Advanced Directive: Patient has Durable Power of  on file.       CC: Follow-up diabetes, hypertension, dyslipidemia.    HPI:   Matthew presents today with the following.      1. Medicare annual wellness visit, subsequent  Screenings performed below advance directives already on file    2. HTN (hypertension), benign  Blood pressure well controlled maintain on lisinopril and hydrochlorothiazide denying any dizziness or weakness.  Electrolytes have remained normal.    3. Controlled type 2 diabetes mellitus with other specified complication, without long-term current use of insulin (HCC)  A1c stable at 6.3 maintained on metformin denying side effects having some difficulty with diet and exercise during pandemic.    4. Dyslipidemia  Maintain on statin without myalgias.  HDL persistently low has been so for several years.        Information for advance directives given to patient or instructed to bring in advance directives into to office to put in chart.      Depression Screening    Little interest or pleasure in doing things?  0 - not at all  Feeling down, depressed , or hopeless? 0 - not at all  Patient Health Questionnaire Score: 0     If depressive symptoms identified deferred to follow up visit unless specifically addressed in assessment and plan.    Interpretation of PHQ-9 Total Score   Score Severity   1-4 No Depression   5-9 Mild Depression   10-14 Moderate Depression   15-19 Moderately Severe Depression   20-27 Severe Depression    Screening for Cognitive Impairment    Three Minute Recall (river,  era peck) 3/3    Jeffrey clock face with all 12 numbers and set the hands to show 10 past 11.  Yes 5/5  Cognitive concerns identified deferred for follow up unless specifically addressed in assessment and plan.    Fall Risk Assessment    Has the patient had two or more falls in the last year or any fall with injury in the last year?  No    Safety Assessment    Throw rugs on floor.  No  Handrails on all stairs.  Yes  Good lighting in all hallways.  Yes  Difficulty hearing.  No  Patient counseled about all safety risks that were identified.    Functional Assessment ADLs    Are there any barriers preventing you from cooking for yourself or meeting nutritional needs?  No.    Are there any barriers preventing you from driving safely or obtaining transportation?  No.    Are there any barriers preventing you from using a telephone or calling for help?  No.    Are there any barriers preventing you from shopping?  No.    Are there any barriers preventing you from taking care of your own finances?  No.    Are there any barriers preventing you from managing your medications?  No.    Are there any barriers preventing you from showering, bathing or dressing yourself?  No.    Are you currently engaging in any exercise or physical activity?  Yes.     What is your perception of your health?  Excellent.      Health Maintenance Summary                URINE ACR / MICROALBUMIN Overdue 4/1/2020      Done 4/1/2019 MICROALBUMIN CREAT RATIO URINE     Patient has more history with this topic...    Annual Wellness Visit Overdue 4/5/2020      Done 4/5/2019 Visit Dx: Medicare annual wellness visit, subsequent     Patient has more history with this topic...    IMM ZOSTER VACCINES Postponed 10/13/2033 Originally 11/24/2016. Insurance/Financial     Done 9/29/2016 Imm Admin: Zoster Vaccine Live (ZVL) (Zostavax)    IMM HEP B VACCINE Postponed 10/12/2034 Originally 1/24/1969. Insurance/Financial    IMM INFLUENZA Next Due 9/1/2020      Done  10/5/2019 Imm Admin: Influenza Vaccine Adult HD     Patient has more history with this topic...    RETINAL SCREENING Next Due 9/17/2020      Done 9/17/2019 REFERRAL FOR RETINAL SCREENING EXAM     Patient has more history with this topic...    DIABETES MONOFILAMENT / LE EXAM Next Due 10/14/2020      Done 10/14/2019 AMB DIABETIC MONOFILAMENT LOWER EXTREMITY EXAM     Patient has more history with this topic...    A1C SCREENING Next Due 1/16/2021      Done 7/16/2020 HEMOGLOBIN A1C     Patient has more history with this topic...    COLON CANCER SCREENING ANNUAL FIT Next Due 6/11/2021      Done 6/11/2020 OCCULT BLOOD FECES IMMUNOASSAY     Patient has more history with this topic...    FASTING LIPID PROFILE Next Due 7/16/2021      Done 7/16/2020 LIPID PROFILE     Patient has more history with this topic...    SERUM CREATININE Next Due 7/16/2021      Done 7/16/2020 COMP METABOLIC PANEL     Patient has more history with this topic...    IMM DTaP/Tdap/Td Vaccine Next Due 10/11/2022      Done 10/11/2012 Imm Admin: Tdap Vaccine          Patient Care Team:  Avila Coker M.D. as PCP - General  Courtney Noyola R.N. as Medical Home Care Manager  Andrew Gonzalez M.D. as Consulting Physician (Ophthalmology)  Sophie Glaeano, [facility] Ass't as              Health Care Screening: Age-appropriate preventive services that Medicare covers were discussed today and ordered as indicated and agreed upon by the patient, and as recommended by USPTF and ACIP.     Patient Active Problem List    Diagnosis Date Noted   • Diabetic retinopathy (HCC) 05/30/2015     Priority: High   • Cubital tunnel syndrome on right 09/11/2019   • Atherosclerosis of abdominal aorta (HCC) 04/04/2019   • Dyslipidemia 10/24/2017   • Controlled type 2 diabetes mellitus, without long-term current use of insulin (HCC) 04/06/2017   • Cataract, diabetic (HCC) 04/02/2015   • Primary localized osteoarthrosis, pelvic region and thigh  10/21/2014   • HTN (hypertension), benign 2010       Current Outpatient Medications   Medication Sig Dispense Refill   • lisinopril (PRINIVIL) 40 MG tablet Take 1 Tab by mouth every day. 100 Tab 3   • hydroCHLOROthiazide (HYDRODIURIL) 25 MG Tab Take 1 Tab by mouth every day. 100 Tab 3   • metFORMIN (GLUCOPHAGE) 500 MG Tab Take 1 Tab by mouth 2 times a day, with meals. 200 Tab 3   • atorvastatin (LIPITOR) 40 MG Tab Take 1 Tab by mouth every day. 100 Tab 3   • aspirin (ASA) 325 MG TABS Take 325 mg by mouth 2 Times a Day.     • Coenzyme Q10 (CO Q 10 PO) Take  by mouth 2 Times a Day.     • therapeutic multivitamin-minerals (THERAGRAN-M) TABS Take 1 Tab by mouth every morning.       No current facility-administered medications for this visit.        Family History   Problem Relation Age of Onset   • Cancer Father    • Cancer Brother        Social History     Socioeconomic History   • Marital status:      Spouse name: Not on file   • Number of children: Not on file   • Years of education: Not on file   • Highest education level: Not on file   Occupational History   • Not on file   Social Needs   • Financial resource strain: Not on file   • Food insecurity     Worry: Not on file     Inability: Not on file   • Transportation needs     Medical: Not on file     Non-medical: Not on file   Tobacco Use   • Smoking status: Former Smoker     Packs/day: 1.00     Years: 12.00     Pack years: 12.00     Types: Cigarettes     Last attempt to quit: 1979     Years since quittin.5   • Smokeless tobacco: Never Used   Substance and Sexual Activity   • Alcohol use: Yes     Alcohol/week: 1.8 oz     Types: 3 Glasses of wine per week     Drinks per session: 7 to 9     Binge frequency: Weekly     Comment: 3 per week   • Drug use: No   • Sexual activity: Yes     Partners: Female   Lifestyle   • Physical activity     Days per week: Not on file     Minutes per session: Not on file   • Stress: Not on file   Relationships   •  "Social connections     Talks on phone: Not on file     Gets together: Not on file     Attends Sabianist service: Not on file     Active member of club or organization: Not on file     Attends meetings of clubs or organizations: Not on file     Relationship status: Not on file   • Intimate partner violence     Fear of current or ex partner: Not on file     Emotionally abused: Not on file     Physically abused: Not on file     Forced sexual activity: Not on file   Other Topics Concern   •  Service Yes   • Blood Transfusions No   • Caffeine Concern No   • Occupational Exposure No   • Hobby Hazards No   • Sleep Concern No   • Stress Concern No   • Weight Concern No   • Special Diet No   • Back Care No   • Exercise Yes   • Bike Helmet No   • Seat Belt Yes   • Self-Exams Yes   Social History Narrative   • Not on file       Past Surgical History:   Procedure Laterality Date   • PB WRIST ARTHROSCOP,RELEASE XVERS LIG Right 9/11/2019    Procedure: RELEASE, CARPAL TUNNEL, ENDOSCOPIC;  Surgeon: Titus Lopez M.D.;  Location: SURGERY [facility];  Service: Orthopedics   • PB REVISE ULNAR NERVE AT ELBOW  9/11/2019    Procedure: RELEASE, CUBITAL TUNNEL;  Surgeon: Titus Lopez M.D.;  Location: [facility];  Service: Orthopedics   • HIP ARTHROPLASTY TOTAL  1/20/2015    Performed by Ziyad Tang M.D. at SURGERY [facility]   • HIP ARTHROPLASTY TOTAL  10/21/2014    Performed by Ziyad Tang M.D. at [facility]   • KNEE ARTHROSCOPY Right 1990   • TONSILLECTOMY         Allergies as of 07/22/2020   • (No Known Allergies)        ROS: Denies Chest pain, SOB, LE edema.    /72 (BP Location: Left arm, Patient Position: Sitting)   Pulse (!) 102   Temp 36.3 °C (97.4 °F)   Ht 1.93 m (6' 4\")   Wt 103.9 kg (229 lb 0.9 oz)   SpO2 96%   BMI 27.88 kg/m²     Physical Exam:  Gen:         Alert and oriented, No apparent distress.  Neck:        No Lymphadenopathy or " Bruits.  Lungs:     Clear to auscultation bilaterally  CV:          Regular rate and rhythm. No murmurs, rubs or gallops.  Abd:         Soft non tender, non distended. Normal active bowel sounds.  No  Hepatosplenomegaly, No pulsatile masses.                   Ext:          No clubbing, cyanosis, edema.      Assessment and Plan.   70 y.o. male with the following issues.    1. Medicare annual wellness visit, subsequent  Discussed healthy lifestyle habits as well as screening regimens.  Discussion about safe lifestyle practices, seatbelts, sunscreen, dietary recommendations.  - Subsequent Annual Wellness Visit - Includes PPPS ()    2. HTN (hypertension), benign  Currently well controlled, Discuss diet, exercise and salt restriction.  No change to medication therapy.  -  - lisinopril (PRINIVIL) 40 MG tablet; Take 1 Tab by mouth every day.  Dispense: 100 Tab; Refill: 3  - hydroCHLOROthiazide (HYDRODIURIL) 25 MG Tab; Take 1 Tab by mouth every day.  Dispense: 100 Tab; Refill: 3    3. Controlled type 2 diabetes mellitus with other specified complication, without long-term current use of insulin (HCC)  Currently well controlled no changes. Discussed diet and exercise recheck A1c in 6 months. Reminded about yearly eye exam.  - metFORMIN (GLUCOPHAGE) 500 MG Tab; Take 1 Tab by mouth 2 times a day, with meals.  Dispense: 200 Tab; Refill: 3    4. Dyslipidemia  Lipids currently well controlled. Discussed continued diet and exercise recheck 6 months to 1 year.    - atorvastatin (LIPITOR) 40 MG Tab; Take 1 Tab by mouth every day.  Dispense: 100 Tab; Refill: 3    5. Mild nonproliferative diabetic retinopathy without macular edema associated with type 2 diabetes mellitus, unspecified laterality (HCC)  Follow along with ophthalmology  - Subsequent Annual Wellness Visit - Includes PPPS ()    6. Cataract, diabetic (HCC)  Again follow with ophthalmology  - Subsequent Annual Wellness Visit - Includes PPPS ()    7.  Atherosclerosis of abdominal aorta (HCC)  Continue risk reduction  - Subsequent Annual Wellness Visit - Includes PPPS ()        Referrals offered: Community-based lifestyle interventions to reduce health risks and promote self-management and wellness, fall prevention, nutrition, physical activity, tobacco-use cessation, weight loss, and mental health services as per orders if indicated.    Discussion today about general wellness and lifestyle habits:    · Prevent falls and reduce trip hazards; Cautioned about securing or removing rugs.  · Have a working fire alarm and carbon monoxide detector;   · Engage in regular physical activity and social activities

## 2020-10-14 ENCOUNTER — IMMUNIZATION (OUTPATIENT)
Dept: SOCIAL WORK | Facility: CLINIC | Age: 70
End: 2020-10-14
Payer: MEDICARE

## 2020-10-14 DIAGNOSIS — Z23 NEED FOR VACCINATION: ICD-10-CM

## 2020-10-14 PROCEDURE — 90662 IIV NO PRSV INCREASED AG IM: CPT | Performed by: REGISTERED NURSE

## 2020-10-14 PROCEDURE — G0008 ADMIN INFLUENZA VIRUS VAC: HCPCS | Performed by: REGISTERED NURSE

## 2020-10-20 ENCOUNTER — OFFICE VISIT (OUTPATIENT)
Dept: MEDICAL GROUP | Facility: MEDICAL CENTER | Age: 70
End: 2020-10-20
Payer: MEDICARE

## 2020-10-20 VITALS
WEIGHT: 229.94 LBS | DIASTOLIC BLOOD PRESSURE: 74 MMHG | RESPIRATION RATE: 16 BRPM | TEMPERATURE: 97.3 F | BODY MASS INDEX: 28 KG/M2 | SYSTOLIC BLOOD PRESSURE: 136 MMHG | HEIGHT: 76 IN | HEART RATE: 60 BPM | OXYGEN SATURATION: 99 %

## 2020-10-20 DIAGNOSIS — R79.89 ABNORMAL CBC: ICD-10-CM

## 2020-10-20 DIAGNOSIS — H26.9 CATARACT OF BOTH EYES, UNSPECIFIED CATARACT TYPE: ICD-10-CM

## 2020-10-20 DIAGNOSIS — E11.69 CONTROLLED TYPE 2 DIABETES MELLITUS WITH OTHER SPECIFIED COMPLICATION, WITHOUT LONG-TERM CURRENT USE OF INSULIN (HCC): ICD-10-CM

## 2020-10-20 DIAGNOSIS — E78.5 DYSLIPIDEMIA: ICD-10-CM

## 2020-10-20 DIAGNOSIS — I10 HTN (HYPERTENSION), BENIGN: Chronic | ICD-10-CM

## 2020-10-20 PROCEDURE — 99214 OFFICE O/P EST MOD 30 MIN: CPT | Performed by: INTERNAL MEDICINE

## 2020-10-20 SDOH — HEALTH STABILITY: MENTAL HEALTH: HOW OFTEN DO YOU HAVE A DRINK CONTAINING ALCOHOL?: 2-4 TIMES A MONTH

## 2020-10-20 SDOH — HEALTH STABILITY: MENTAL HEALTH: HOW MANY STANDARD DRINKS CONTAINING ALCOHOL DO YOU HAVE ON A TYPICAL DAY?: 1 OR 2

## 2020-10-20 SDOH — HEALTH STABILITY: MENTAL HEALTH: HOW OFTEN DO YOU HAVE 6 OR MORE DRINKS ON ONE OCCASION?: LESS THAN MONTHLY

## 2020-10-20 NOTE — PROGRESS NOTES
New Patient to Establish    Reason to establish: New patient to establish    Matthew Perez is a 70 y.o. male who presents today with the following:    CC:   Chief Complaint   Patient presents with   • Establish Care       HPI:     Controlled type 2 diabetes mellitus, without long-term current use of insulin (HCC)  Stable on metformin     Ref. Range 7/16/2020 09:15   Glycohemoglobin Latest Ref Range: 0.0 - 5.6 % 6.3 (H)     Hx of cataracts following with optometry  10/20/20  Monofilament testing with a 10 gram force: sensation intact: intact bilaterally  Visual Inspection: Feet without maceration, ulcers, fissures.  Pedal pulses: decreased on left          HTN (hypertension), benign  Stable on lisinopril, HCTZ        Dyslipidemia  Stable on Lipitor      Cataract of both eyes  Following with eye doctor          Current Outpatient Medications:   •  aspirin EC (ECOTRIN) 81 MG Tablet Delayed Response, Take 81 mg by mouth every day., Disp: 30 Tab, Rfl:   •  lisinopril (PRINIVIL) 40 MG tablet, Take 1 Tab by mouth every day., Disp: 100 Tab, Rfl: 3  •  hydroCHLOROthiazide (HYDRODIURIL) 25 MG Tab, Take 1 Tab by mouth every day., Disp: 100 Tab, Rfl: 3  •  metFORMIN (GLUCOPHAGE) 500 MG Tab, Take 1 Tab by mouth 2 times a day, with meals., Disp: 200 Tab, Rfl: 3  •  atorvastatin (LIPITOR) 40 MG Tab, Take 1 Tab by mouth every day., Disp: 100 Tab, Rfl: 3  •  Coenzyme Q10 (CO Q 10 PO), Take  by mouth 2 Times a Day., Disp: , Rfl:   •  therapeutic multivitamin-minerals (THERAGRAN-M) TABS, Take 1 Tab by mouth every morning., Disp: , Rfl:     Allergies, past medical history, past surgical history, medications, family history, social history reviewed and updated.    ROS     Constitutional: Denies fevers or chills  Eyes: Denies changes in vision  Ears/Nose/Throat/Mouth: Denies nasal congestion or sore throat   Cardiovascular: Denies chest pain or palpitations   Respiratory: Denies shortness of breath , Denies  "cough  Gastrointestinal/Hepatic: Denies abd pain, nausea, vomiting   Genitourinary: Denies dysuria or frequency  Musculoskeletal/Rheum: Denies joint pain and swelling   Neurological: Denies headache  Psychiatric: Denies mood disorder   Endocrine:  hx of diabetes Denies thyroid dysfunction  Heme/Oncology/Lymph Nodes: Denies weight changes or enlarged LNs.    Physical Exam  /74 (BP Location: Left arm, Patient Position: Sitting, BP Cuff Size: Adult)   Pulse 60   Temp 36.3 °C (97.3 °F) (Temporal)   Resp 16   Ht 1.93 m (6' 4\")   Wt 104.3 kg (229 lb 15 oz)   SpO2 99%   BMI 27.99 kg/m²   General: Normal appearance.  Well developed, well nourished, no acute distress.  HEENT: Normocephalic.  Extraocular motion intact. Pupils are equally round, reactive to light and accommodation, conjunctiva clear, no scleral icterus.  Ears: normal shape and contour, ear canals clear, tympanic membranes intact. Hearing intact.  Oropharynx clear, no erythema, edema or exudate noted.  NECK: Thyroid is not enlarged. No JVD.  No carotid bruits. No masses.  Cardiovascular: Regular rhythm and rate. No murmur/rubs/gallops.   Respiratory: Normal respiratory effort, clear to auscultation bilaterally. No wheezing/rales/rhonchi.    Abdomen: Bowel sounds present, soft, nontender, nondistended, no rebound, no guarding.   : No suprapubic tenderness. No CVA tenderness.   EXT: no LE edema b/l. No cyanosis.  No clubbing.  Lymph: No cervical, supraclavicular or axillary lymph nodes are palpable  Skin: Warm and dry.  Sun damaged skin on scalp.   Neurologic: No focal deficits.   Psych: AAOx3,  Normal mood and affect, normal judgment and insight, memory within normal limits    Assessment and Plan    1. Abnormal CBC  - CBC WITH DIFFERENTIAL; Future    2. Controlled type 2 diabetes mellitus with other specified complication, without long-term current use of insulin (HCC)  - MICROALBUMIN CREAT RATIO URINE; Future  - Diabetic Monofilament LE " "Exam  Metformin    3. HTN (hypertension), benign  - TSH WITH REFLEX TO FT4; Future  Continue lisinopril and HCTZ    4. Dyslipidemia  Statin    5. Cataract of both eyes, unspecified cataract type  Following with optometry    We had discussion with patient regarding the benefit and risk of aspirin. Per patient, he had hx of TIA in the past. So, we decides to say on aspirin and monitor any adverse reaction.     Reviewed sun protective behavior including sunscreen application and reapplication, appropriate choice of SPF, hat, protective clothing, seeking shade and never choosing to \"lie out\" in the sun.  ABCD skin mole check discussed      Follow-up:Return in about 6 months (around 4/20/2021), or if symptoms worsen or fail to improve.    This note was created using voice recognition software. There may be unintended errors in spelling, grammar or content.    "

## 2020-10-20 NOTE — ASSESSMENT & PLAN NOTE
Stable on metformin     Ref. Range 7/16/2020 09:15   Glycohemoglobin Latest Ref Range: 0.0 - 5.6 % 6.3 (H)     Hx of cataracts following with optometry  10/20/20  Monofilament testing with a 10 gram force: sensation intact: intact bilaterally  Visual Inspection: Feet without maceration, ulcers, fissures.  Pedal pulses: decreased on left

## 2020-10-26 ENCOUNTER — HOSPITAL ENCOUNTER (OUTPATIENT)
Dept: LAB | Facility: MEDICAL CENTER | Age: 70
End: 2020-10-26
Attending: INTERNAL MEDICINE
Payer: MEDICARE

## 2020-10-26 DIAGNOSIS — R79.89 ABNORMAL CBC: ICD-10-CM

## 2020-10-26 DIAGNOSIS — I10 HTN (HYPERTENSION), BENIGN: Chronic | ICD-10-CM

## 2020-10-26 DIAGNOSIS — E11.69 CONTROLLED TYPE 2 DIABETES MELLITUS WITH OTHER SPECIFIED COMPLICATION, WITHOUT LONG-TERM CURRENT USE OF INSULIN (HCC): ICD-10-CM

## 2020-10-26 LAB
BASOPHILS # BLD AUTO: 1 % (ref 0–1.8)
BASOPHILS # BLD: 0.06 K/UL (ref 0–0.12)
CREAT UR-MCNC: 108.53 MG/DL
EOSINOPHIL # BLD AUTO: 0.15 K/UL (ref 0–0.51)
EOSINOPHIL NFR BLD: 2.4 % (ref 0–6.9)
ERYTHROCYTE [DISTWIDTH] IN BLOOD BY AUTOMATED COUNT: 49.5 FL (ref 35.9–50)
HCT VFR BLD AUTO: 48.4 % (ref 42–52)
HGB BLD-MCNC: 15.8 G/DL (ref 14–18)
IMM GRANULOCYTES # BLD AUTO: 0.01 K/UL (ref 0–0.11)
IMM GRANULOCYTES NFR BLD AUTO: 0.2 % (ref 0–0.9)
LYMPHOCYTES # BLD AUTO: 1.85 K/UL (ref 1–4.8)
LYMPHOCYTES NFR BLD: 29.3 % (ref 22–41)
MCH RBC QN AUTO: 30.5 PG (ref 27–33)
MCHC RBC AUTO-ENTMCNC: 32.6 G/DL (ref 33.7–35.3)
MCV RBC AUTO: 93.4 FL (ref 81.4–97.8)
MICROALBUMIN UR-MCNC: <1.2 MG/DL
MICROALBUMIN/CREAT UR: NORMAL MG/G (ref 0–30)
MONOCYTES # BLD AUTO: 0.64 K/UL (ref 0–0.85)
MONOCYTES NFR BLD AUTO: 10.1 % (ref 0–13.4)
NEUTROPHILS # BLD AUTO: 3.6 K/UL (ref 1.82–7.42)
NEUTROPHILS NFR BLD: 57 % (ref 44–72)
NRBC # BLD AUTO: 0 K/UL
NRBC BLD-RTO: 0 /100 WBC
PLATELET # BLD AUTO: 258 K/UL (ref 164–446)
PMV BLD AUTO: 11.3 FL (ref 9–12.9)
RBC # BLD AUTO: 5.18 M/UL (ref 4.7–6.1)
TSH SERPL DL<=0.005 MIU/L-ACNC: 2.64 UIU/ML (ref 0.38–5.33)
WBC # BLD AUTO: 6.3 K/UL (ref 4.8–10.8)

## 2020-10-26 PROCEDURE — 36415 COLL VENOUS BLD VENIPUNCTURE: CPT

## 2020-10-26 PROCEDURE — 82043 UR ALBUMIN QUANTITATIVE: CPT

## 2020-10-26 PROCEDURE — 82570 ASSAY OF URINE CREATININE: CPT

## 2020-10-26 PROCEDURE — 84443 ASSAY THYROID STIM HORMONE: CPT

## 2020-10-26 PROCEDURE — 85025 COMPLETE CBC W/AUTO DIFF WBC: CPT

## 2020-11-06 ENCOUNTER — PATIENT OUTREACH (OUTPATIENT)
Dept: HEALTH INFORMATION MANAGEMENT | Facility: OTHER | Age: 70
End: 2020-11-06

## 2020-11-06 NOTE — PROGRESS NOTES
Called member to confirm [facility] PCP for next years plan. Verified HIPPA. Member is established with Tariq Aquino at [place] [id number] and does not plan on making any changes. Member had no questions or concerns at this time. Used ticketscript and Epic.

## 2021-01-15 DIAGNOSIS — Z23 NEED FOR VACCINATION: ICD-10-CM

## 2021-04-06 DIAGNOSIS — I10 HTN (HYPERTENSION), BENIGN: Chronic | ICD-10-CM

## 2021-04-06 DIAGNOSIS — Z12.5 PROSTATE CANCER SCREENING: ICD-10-CM

## 2021-04-06 DIAGNOSIS — E78.5 DYSLIPIDEMIA: ICD-10-CM

## 2021-04-06 DIAGNOSIS — E11.69 CONTROLLED TYPE 2 DIABETES MELLITUS WITH OTHER SPECIFIED COMPLICATION, WITHOUT LONG-TERM CURRENT USE OF INSULIN (HCC): ICD-10-CM

## 2021-04-06 NOTE — PROGRESS NOTES
Controlled type 2 diabetes mellitus with other specified complication, without long-term current use of insulin (HCC)  -     Comp Metabolic Panel; Future  -     HEMOGLOBIN A1C; Future    Dyslipidemia  -     Lipid Profile; Future    HTN (hypertension), benign  -     Comp Metabolic Panel; Future    Prostate cancer screening

## 2021-04-13 ENCOUNTER — HOSPITAL ENCOUNTER (OUTPATIENT)
Dept: LAB | Facility: MEDICAL CENTER | Age: 71
End: 2021-04-13
Attending: INTERNAL MEDICINE
Payer: MEDICARE

## 2021-04-13 DIAGNOSIS — E11.69 CONTROLLED TYPE 2 DIABETES MELLITUS WITH OTHER SPECIFIED COMPLICATION, WITHOUT LONG-TERM CURRENT USE OF INSULIN (HCC): ICD-10-CM

## 2021-04-13 DIAGNOSIS — I10 HTN (HYPERTENSION), BENIGN: Chronic | ICD-10-CM

## 2021-04-13 DIAGNOSIS — E78.5 DYSLIPIDEMIA: ICD-10-CM

## 2021-04-13 LAB
ALBUMIN SERPL BCP-MCNC: 4.3 G/DL (ref 3.2–4.9)
ALBUMIN/GLOB SERPL: 1.6 G/DL
ALP SERPL-CCNC: 116 U/L (ref 30–99)
ALT SERPL-CCNC: 22 U/L (ref 2–50)
ANION GAP SERPL CALC-SCNC: 6 MMOL/L (ref 7–16)
AST SERPL-CCNC: 25 U/L (ref 12–45)
BILIRUB SERPL-MCNC: 0.5 MG/DL (ref 0.1–1.5)
BUN SERPL-MCNC: 14 MG/DL (ref 8–22)
CALCIUM SERPL-MCNC: 10 MG/DL (ref 8.5–10.5)
CHLORIDE SERPL-SCNC: 100 MMOL/L (ref 96–112)
CHOLEST SERPL-MCNC: 108 MG/DL (ref 100–199)
CO2 SERPL-SCNC: 29 MMOL/L (ref 20–33)
CREAT SERPL-MCNC: 0.84 MG/DL (ref 0.5–1.4)
EST. AVERAGE GLUCOSE BLD GHB EST-MCNC: 131 MG/DL
FASTING STATUS PATIENT QL REPORTED: NORMAL
GLOBULIN SER CALC-MCNC: 2.7 G/DL (ref 1.9–3.5)
GLUCOSE SERPL-MCNC: 112 MG/DL (ref 65–99)
HBA1C MFR BLD: 6.2 % (ref 4–5.6)
HDLC SERPL-MCNC: 29 MG/DL
LDLC SERPL CALC-MCNC: 66 MG/DL
POTASSIUM SERPL-SCNC: 4.2 MMOL/L (ref 3.6–5.5)
PROT SERPL-MCNC: 7 G/DL (ref 6–8.2)
SODIUM SERPL-SCNC: 135 MMOL/L (ref 135–145)
TRIGL SERPL-MCNC: 63 MG/DL (ref 0–149)

## 2021-04-13 PROCEDURE — 83036 HEMOGLOBIN GLYCOSYLATED A1C: CPT

## 2021-04-13 PROCEDURE — 80061 LIPID PANEL: CPT

## 2021-04-13 PROCEDURE — 36415 COLL VENOUS BLD VENIPUNCTURE: CPT

## 2021-04-13 PROCEDURE — 80053 COMPREHEN METABOLIC PANEL: CPT

## 2021-04-20 ENCOUNTER — OFFICE VISIT (OUTPATIENT)
Dept: MEDICAL GROUP | Facility: MEDICAL CENTER | Age: 71
End: 2021-04-20
Payer: MEDICARE

## 2021-04-20 VITALS
OXYGEN SATURATION: 98 % | TEMPERATURE: 97.6 F | HEIGHT: 76 IN | WEIGHT: 231.48 LBS | HEART RATE: 85 BPM | BODY MASS INDEX: 28.19 KG/M2 | RESPIRATION RATE: 18 BRPM | DIASTOLIC BLOOD PRESSURE: 82 MMHG | SYSTOLIC BLOOD PRESSURE: 150 MMHG

## 2021-04-20 DIAGNOSIS — I70.0 ATHEROSCLEROSIS OF ABDOMINAL AORTA (HCC): ICD-10-CM

## 2021-04-20 DIAGNOSIS — R74.8 ELEVATED ALKALINE PHOSPHATASE LEVEL: ICD-10-CM

## 2021-04-20 DIAGNOSIS — I10 HTN (HYPERTENSION), BENIGN: Chronic | ICD-10-CM

## 2021-04-20 DIAGNOSIS — E11.69 CONTROLLED TYPE 2 DIABETES MELLITUS WITH OTHER SPECIFIED COMPLICATION, WITHOUT LONG-TERM CURRENT USE OF INSULIN (HCC): ICD-10-CM

## 2021-04-20 DIAGNOSIS — E55.9 VITAMIN D INSUFFICIENCY: ICD-10-CM

## 2021-04-20 DIAGNOSIS — E78.5 DYSLIPIDEMIA: ICD-10-CM

## 2021-04-20 DIAGNOSIS — Z12.5 PROSTATE CANCER SCREENING: ICD-10-CM

## 2021-04-20 PROCEDURE — 99214 OFFICE O/P EST MOD 30 MIN: CPT | Performed by: INTERNAL MEDICINE

## 2021-04-20 RX ORDER — ASPIRIN 325 MG
81 TABLET ORAL DAILY
COMMUNITY
End: 2021-04-20

## 2021-04-20 RX ORDER — ATORVASTATIN CALCIUM 40 MG/1
40 TABLET, FILM COATED ORAL DAILY
Qty: 100 TABLET | Refills: 3 | Status: SHIPPED | OUTPATIENT
Start: 2021-04-20 | End: 2022-04-21

## 2021-04-20 RX ORDER — ASPIRIN 81 MG/1
81 TABLET, CHEWABLE ORAL DAILY
COMMUNITY

## 2021-04-20 RX ORDER — HYDROCHLOROTHIAZIDE 25 MG/1
25 TABLET ORAL DAILY
Qty: 100 TABLET | Refills: 3 | Status: SHIPPED | OUTPATIENT
Start: 2021-04-20 | End: 2022-04-21 | Stop reason: SDUPTHER

## 2021-04-20 RX ORDER — LISINOPRIL 40 MG/1
40 TABLET ORAL DAILY
Qty: 100 TABLET | Refills: 3 | Status: SHIPPED | OUTPATIENT
Start: 2021-04-20 | End: 2022-04-21 | Stop reason: SDUPTHER

## 2021-04-20 ASSESSMENT — PATIENT HEALTH QUESTIONNAIRE - PHQ9: CLINICAL INTERPRETATION OF PHQ2 SCORE: 0

## 2021-04-20 ASSESSMENT — FIBROSIS 4 INDEX: FIB4 SCORE: 1.49

## 2021-04-20 NOTE — ASSESSMENT & PLAN NOTE
Stable on metformin     Ref. Range 4/13/2021 09:47   Glycohemoglobin Latest Ref Range: 4.0 - 5.6 % 6.2 (H)   Estim. Avg Glu Latest Units: mg/dL 131      Ref. Range 7/16/2020 09:15   Glycohemoglobin Latest Ref Range: 0.0 - 5.6 % 6.3 (H)     Hx of cataracts following with optometry  10/20/20  Monofilament testing with a 10 gram force: sensation intact: intact bilaterally  Visual Inspection: Feet without maceration, ulcers, fissures.  Pedal pulses: decreased on left

## 2021-04-20 NOTE — PROGRESS NOTES
Annual Health Assessment Questions:    1.  Are you currently engaging in any exercise or physical activity? Yes    2.  How would you describe your mood or emotional well-being today? good    3.  Have you had any falls in the last year? No    4.  Have you noticed any problems with your balance or had difficulty walking? No    5.  In the last six months have you experienced any leakage of urine? No    6. DPA/Advanced Directive: Patient has Advanced Directive on file.        Established Patient    Matthew Perez is a 71 y.o. male who presents today with the following:    CC: diabetes, HTN, cholesterol    HPI:     Atherosclerosis of abdominal aorta (HCC)  Continue risk reduction  statin      Controlled type 2 diabetes mellitus, without long-term current use of insulin (HCC)  Stable on metformin     Ref. Range 4/13/2021 09:47   Glycohemoglobin Latest Ref Range: 4.0 - 5.6 % 6.2 (H)   Estim. Avg Glu Latest Units: mg/dL 131      Ref. Range 7/16/2020 09:15   Glycohemoglobin Latest Ref Range: 0.0 - 5.6 % 6.3 (H)     Hx of cataracts following with optometry  10/20/20  Monofilament testing with a 10 gram force: sensation intact: intact bilaterally  Visual Inspection: Feet without maceration, ulcers, fissures.  Pedal pulses: decreased on left        Dyslipidemia  Stable on Lipitor      HTN (hypertension), benign  Stable on lisinopril, HCTZ        Vitamin D insufficiency  On replacement        Current Outpatient Medications   Medication Sig Dispense Refill   • aspirin (ASA) 81 MG Chew Tab chewable tablet Chew 81 mg every day.     • atorvastatin (LIPITOR) 40 MG Tab Take 1 tablet by mouth every day. 100 tablet 3   • hydroCHLOROthiazide (HYDRODIURIL) 25 MG Tab Take 1 tablet by mouth every day. 100 tablet 3   • lisinopril (PRINIVIL) 40 MG tablet Take 1 tablet by mouth every day. 100 tablet 3   • metFORMIN (GLUCOPHAGE) 500 MG Tab Take 1 tablet by mouth 2 times a day with meals. 200 tablet 3   • Coenzyme Q10 (CO Q 10 PO) Take   "by mouth 2 Times a Day.     • therapeutic multivitamin-minerals (THERAGRAN-M) TABS Take 1 Tab by mouth every morning.       No current facility-administered medications for this visit.       Allergies, past medical history, past surgical history, medications, family history, social history reviewed and updated.    ROS   Constitutional: Denies fevers or chills  Eyes: Denies changes in vision  Ears/Nose/Throat/Mouth: Denies nasal congestion or sore throat   Cardiovascular: Denies chest pain or palpitations   Respiratory: Denies shortness of breath , Denies cough  Gastrointestinal/Hepatic: Denies abd pain, nausea, vomiting   Genitourinary: Denies dysuria or frequency  Musculoskeletal/Rheum: Denies joint pain and swelling   Neurological: Denies headache  Psychiatric: Denies mood disorder   Endocrine: Denies hx of diabetes or thyroid dysfunction  Heme/Oncology/Lymph Nodes: Denies weight changes or enlarged LNs.    Physical Exam  Vitals: /82 (BP Location: Left arm, Patient Position: Sitting, BP Cuff Size: Adult)   Pulse 85   Temp 36.4 °C (97.6 °F) (Temporal)   Resp 18   Ht 1.93 m (6' 4\")   Wt 105 kg (231 lb 7.7 oz)   SpO2 98%   BMI 28.18 kg/m²   General: Alert, pleasant, NAD  HEENT: Normocephalic.  EOMI, no icterus or pallor.  Conjunctivae and lids normal. External ears normal. Wearing a mask. Oropharynx non-erythematous, mucous membranes moist.  Neck supple.  No thyromegaly or masses palpated.   Lymph: No cervical or supraclavicular lymphadenopathy.  Cardiovascular: Regular rate and rhythm.  S1 and S2 normal.  No murmurs appreciated.  Respiratory: Normal respiratory effort.  Clear to auscultation bilaterally.  Abdomen: Non-distended, soft, non-tender  Skin: Warm, dry, no rashes.  Musculoskeletal: Gait is normal.  Moves all extremities well.  Extremities: No leg edema.  Radial pulses 2+ symmetric.   Psych:  Affect/mood is normal, judgement is good, memory is intact, grooming is appropriate.      Labs " (4/13/21) were reviewed and discussed with patients.  All questions were answered.      Assessment and Plan    1. Dyslipidemia  - atorvastatin (LIPITOR) 40 MG Tab; Take 1 tablet by mouth every day.  Dispense: 100 tablet; Refill: 3  - TSH WITH REFLEX TO FT4; Future    2. HTN (hypertension), benign  - hydroCHLOROthiazide (HYDRODIURIL) 25 MG Tab; Take 1 tablet by mouth every day.  Dispense: 100 tablet; Refill: 3  - lisinopril (PRINIVIL) 40 MG tablet; Take 1 tablet by mouth every day.  Dispense: 100 tablet; Refill: 3  - Comp Metabolic Panel; Future  - CBC WITH DIFFERENTIAL; Future  - PROSTATE SPECIFIC AG SCREENING; Future    3. Controlled type 2 diabetes mellitus with other specified complication, without long-term current use of insulin (HCC)  - metFORMIN (GLUCOPHAGE) 500 MG Tab; Take 1 tablet by mouth 2 times a day with meals.  Dispense: 200 tablet; Refill: 3  - HEMOGLOBIN A1C; Future    4. Elevated alkaline phosphatase level  - ALKALINE PHOSPHATASE ISOENZYMES; Future  - GAMMA GT (GGT); Future    5. Prostate cancer screening  - PROSTATE SPECIFIC AG SCREENING; Future    6. Vitamin D insufficiency  - VITAMIN D,25 HYDROXY; Future  Replacement    7. Atherosclerosis of abdominal aorta (HCC)  - atorvastatin (LIPITOR) 40 MG Tab; Take 1 tablet by mouth every day.  Dispense: 100 tablet; Refill: 3    Other orders  - aspirin (ASA) 81 MG Chew Tab chewable tablet; Chew 81 mg every day.        Follow-up:Return in about 6 months (around 10/20/2021), or if symptoms worsen or fail to improve.    This note was created using voice recognition software. There may be unintended errors in spelling, grammar or content.

## 2021-06-24 VITALS — SYSTOLIC BLOOD PRESSURE: 129 MMHG | DIASTOLIC BLOOD PRESSURE: 64 MMHG

## 2021-07-21 ENCOUNTER — PATIENT MESSAGE (OUTPATIENT)
Dept: HEALTH INFORMATION MANAGEMENT | Facility: OTHER | Age: 71
End: 2021-07-21

## 2021-08-10 ENCOUNTER — HOSPITAL ENCOUNTER (OUTPATIENT)
Facility: MEDICAL CENTER | Age: 71
End: 2021-08-10
Payer: MEDICARE

## 2021-08-10 PROCEDURE — 82274 ASSAY TEST FOR BLOOD FECAL: CPT

## 2021-08-20 LAB — IMM ASSAY OCC BLD FITOB: NEGATIVE

## 2021-10-14 ENCOUNTER — HOSPITAL ENCOUNTER (OUTPATIENT)
Dept: LAB | Facility: MEDICAL CENTER | Age: 71
End: 2021-10-14
Attending: INTERNAL MEDICINE
Payer: MEDICARE

## 2021-10-14 DIAGNOSIS — I10 HTN (HYPERTENSION), BENIGN: Chronic | ICD-10-CM

## 2021-10-14 DIAGNOSIS — E55.9 VITAMIN D INSUFFICIENCY: ICD-10-CM

## 2021-10-14 DIAGNOSIS — Z12.5 PROSTATE CANCER SCREENING: ICD-10-CM

## 2021-10-14 DIAGNOSIS — E78.5 DYSLIPIDEMIA: ICD-10-CM

## 2021-10-14 DIAGNOSIS — R74.8 ELEVATED ALKALINE PHOSPHATASE LEVEL: ICD-10-CM

## 2021-10-14 DIAGNOSIS — E11.69 CONTROLLED TYPE 2 DIABETES MELLITUS WITH OTHER SPECIFIED COMPLICATION, WITHOUT LONG-TERM CURRENT USE OF INSULIN (HCC): ICD-10-CM

## 2021-10-14 LAB
25(OH)D3 SERPL-MCNC: 40 NG/ML (ref 30–100)
ALBUMIN SERPL BCP-MCNC: 4.5 G/DL (ref 3.2–4.9)
ALBUMIN/GLOB SERPL: 2 G/DL
ALP SERPL-CCNC: 101 U/L (ref 30–99)
ALT SERPL-CCNC: 22 U/L (ref 2–50)
ANION GAP SERPL CALC-SCNC: 11 MMOL/L (ref 7–16)
AST SERPL-CCNC: 16 U/L (ref 12–45)
BASOPHILS # BLD AUTO: 0.6 % (ref 0–1.8)
BASOPHILS # BLD: 0.04 K/UL (ref 0–0.12)
BILIRUB SERPL-MCNC: 0.6 MG/DL (ref 0.1–1.5)
BUN SERPL-MCNC: 13 MG/DL (ref 8–22)
CALCIUM SERPL-MCNC: 10.1 MG/DL (ref 8.5–10.5)
CHLORIDE SERPL-SCNC: 98 MMOL/L (ref 96–112)
CO2 SERPL-SCNC: 28 MMOL/L (ref 20–33)
CREAT SERPL-MCNC: 0.76 MG/DL (ref 0.5–1.4)
EOSINOPHIL # BLD AUTO: 0.13 K/UL (ref 0–0.51)
EOSINOPHIL NFR BLD: 1.8 % (ref 0–6.9)
ERYTHROCYTE [DISTWIDTH] IN BLOOD BY AUTOMATED COUNT: 49.4 FL (ref 35.9–50)
EST. AVERAGE GLUCOSE BLD GHB EST-MCNC: 140 MG/DL
GGT SERPL-CCNC: 19 U/L (ref 7–51)
GLOBULIN SER CALC-MCNC: 2.2 G/DL (ref 1.9–3.5)
GLUCOSE SERPL-MCNC: 112 MG/DL (ref 65–99)
HBA1C MFR BLD: 6.5 % (ref 4–5.6)
HCT VFR BLD AUTO: 50.2 % (ref 42–52)
HGB BLD-MCNC: 16.9 G/DL (ref 14–18)
IMM GRANULOCYTES # BLD AUTO: 0.02 K/UL (ref 0–0.11)
IMM GRANULOCYTES NFR BLD AUTO: 0.3 % (ref 0–0.9)
LYMPHOCYTES # BLD AUTO: 2.13 K/UL (ref 1–4.8)
LYMPHOCYTES NFR BLD: 29.4 % (ref 22–41)
MCH RBC QN AUTO: 31.6 PG (ref 27–33)
MCHC RBC AUTO-ENTMCNC: 33.7 G/DL (ref 33.7–35.3)
MCV RBC AUTO: 93.8 FL (ref 81.4–97.8)
MONOCYTES # BLD AUTO: 0.9 K/UL (ref 0–0.85)
MONOCYTES NFR BLD AUTO: 12.4 % (ref 0–13.4)
NEUTROPHILS # BLD AUTO: 4.03 K/UL (ref 1.82–7.42)
NEUTROPHILS NFR BLD: 55.5 % (ref 44–72)
NRBC # BLD AUTO: 0 K/UL
NRBC BLD-RTO: 0 /100 WBC
PLATELET # BLD AUTO: 279 K/UL (ref 164–446)
PMV BLD AUTO: 11.6 FL (ref 9–12.9)
POTASSIUM SERPL-SCNC: 4 MMOL/L (ref 3.6–5.5)
PROT SERPL-MCNC: 6.7 G/DL (ref 6–8.2)
PSA SERPL-MCNC: 2.67 NG/ML (ref 0–4)
RBC # BLD AUTO: 5.35 M/UL (ref 4.7–6.1)
SODIUM SERPL-SCNC: 137 MMOL/L (ref 135–145)
TSH SERPL DL<=0.005 MIU/L-ACNC: 3.98 UIU/ML (ref 0.38–5.33)
WBC # BLD AUTO: 7.3 K/UL (ref 4.8–10.8)

## 2021-10-14 PROCEDURE — 84153 ASSAY OF PSA TOTAL: CPT

## 2021-10-14 PROCEDURE — 80053 COMPREHEN METABOLIC PANEL: CPT

## 2021-10-14 PROCEDURE — 84075 ASSAY ALKALINE PHOSPHATASE: CPT

## 2021-10-14 PROCEDURE — 85025 COMPLETE CBC W/AUTO DIFF WBC: CPT

## 2021-10-14 PROCEDURE — 83036 HEMOGLOBIN GLYCOSYLATED A1C: CPT

## 2021-10-14 PROCEDURE — 84080 ASSAY ALKALINE PHOSPHATASES: CPT

## 2021-10-14 PROCEDURE — 82977 ASSAY OF GGT: CPT

## 2021-10-14 PROCEDURE — 82306 VITAMIN D 25 HYDROXY: CPT

## 2021-10-14 PROCEDURE — 36415 COLL VENOUS BLD VENIPUNCTURE: CPT

## 2021-10-14 PROCEDURE — 84443 ASSAY THYROID STIM HORMONE: CPT

## 2021-10-18 LAB
ALP BONE SERPL-CCNC: 31 U/L (ref 0–55)
ALP ISOS SERPL HS-CCNC: 0 U/L
ALP LIVER SERPL-CCNC: 73 U/L (ref 0–94)
ALP SERPL-CCNC: 104 U/L (ref 40–120)

## 2021-10-19 ENCOUNTER — OFFICE VISIT (OUTPATIENT)
Dept: MEDICAL GROUP | Facility: MEDICAL CENTER | Age: 71
End: 2021-10-19
Payer: MEDICARE

## 2021-10-19 ENCOUNTER — HOSPITAL ENCOUNTER (OUTPATIENT)
Facility: MEDICAL CENTER | Age: 71
End: 2021-10-19
Attending: INTERNAL MEDICINE
Payer: MEDICARE

## 2021-10-19 VITALS
DIASTOLIC BLOOD PRESSURE: 96 MMHG | HEIGHT: 75 IN | HEART RATE: 65 BPM | TEMPERATURE: 98 F | BODY MASS INDEX: 28.51 KG/M2 | OXYGEN SATURATION: 98 % | RESPIRATION RATE: 14 BRPM | WEIGHT: 229.28 LBS | SYSTOLIC BLOOD PRESSURE: 156 MMHG

## 2021-10-19 DIAGNOSIS — E11.69 CONTROLLED TYPE 2 DIABETES MELLITUS WITH OTHER SPECIFIED COMPLICATION, WITHOUT LONG-TERM CURRENT USE OF INSULIN (HCC): ICD-10-CM

## 2021-10-19 DIAGNOSIS — Z23 NEED FOR VACCINATION: ICD-10-CM

## 2021-10-19 DIAGNOSIS — E55.9 VITAMIN D INSUFFICIENCY: ICD-10-CM

## 2021-10-19 DIAGNOSIS — I10 ESSENTIAL HYPERTENSION: ICD-10-CM

## 2021-10-19 DIAGNOSIS — E78.5 DYSLIPIDEMIA: ICD-10-CM

## 2021-10-19 LAB
CREAT UR-MCNC: 53.78 MG/DL
MICROALBUMIN UR-MCNC: <1.2 MG/DL
MICROALBUMIN/CREAT UR: NORMAL MG/G (ref 0–30)

## 2021-10-19 PROCEDURE — 90662 IIV NO PRSV INCREASED AG IM: CPT | Performed by: INTERNAL MEDICINE

## 2021-10-19 PROCEDURE — 99214 OFFICE O/P EST MOD 30 MIN: CPT | Mod: 25 | Performed by: INTERNAL MEDICINE

## 2021-10-19 PROCEDURE — 82043 UR ALBUMIN QUANTITATIVE: CPT

## 2021-10-19 PROCEDURE — 82570 ASSAY OF URINE CREATININE: CPT

## 2021-10-19 PROCEDURE — G0008 ADMIN INFLUENZA VIRUS VAC: HCPCS | Performed by: INTERNAL MEDICINE

## 2021-10-19 RX ORDER — AMLODIPINE BESYLATE 2.5 MG/1
2.5 TABLET ORAL EVERY EVENING
Qty: 90 TABLET | Refills: 0 | Status: SHIPPED | OUTPATIENT
Start: 2021-10-19 | End: 2021-11-29 | Stop reason: SDUPTHER

## 2021-10-19 ASSESSMENT — FIBROSIS 4 INDEX: FIB4 SCORE: 0.88

## 2021-10-19 NOTE — ASSESSMENT & PLAN NOTE
Stable on metformin     Ref. Range 10/14/2021 09:32   Glycohemoglobin Latest Ref Range: 4.0 - 5.6 % 6.5 (H)   Estim. Avg Glu Latest Units: mg/dL 140      Ref. Range 4/13/2021 09:47   Glycohemoglobin Latest Ref Range: 4.0 - 5.6 % 6.2 (H)   Estim. Avg Glu Latest Units: mg/dL 131      Ref. Range 7/16/2020 09:15   Glycohemoglobin Latest Ref Range: 0.0 - 5.6 % 6.3 (H)      cataracts following with optometry    10/19/21  Monofilament testing with a 10 gram force: sensation intact: intact bilaterally  Visual Inspection: Feet without maceration, ulcers, fissures.  Pedal pulses: normal

## 2021-10-19 NOTE — PROGRESS NOTES
Established Patient    Matthew Perez is a 71 y.o. male who presents today with the following:    CC:   Chief Complaint   Patient presents with   • Follow-Up   • Lab Results   • Flu Vaccine     HD       HPI:     Essential hypertension  Stable on lisinopril, HCTZ        Vitamin D insufficiency  On replacement      Dyslipidemia  Stable on Lipitor      Controlled type 2 diabetes mellitus, without long-term current use of insulin (HCC)  Stable on metformin     Ref. Range 10/14/2021 09:32   Glycohemoglobin Latest Ref Range: 4.0 - 5.6 % 6.5 (H)   Estim. Avg Glu Latest Units: mg/dL 140      Ref. Range 4/13/2021 09:47   Glycohemoglobin Latest Ref Range: 4.0 - 5.6 % 6.2 (H)   Estim. Avg Glu Latest Units: mg/dL 131      Ref. Range 7/16/2020 09:15   Glycohemoglobin Latest Ref Range: 0.0 - 5.6 % 6.3 (H)      cataracts following with optometry    10/19/21  Monofilament testing with a 10 gram force: sensation intact: intact bilaterally  Visual Inspection: Feet without maceration, ulcers, fissures.  Pedal pulses: normal          Current Outpatient Medications   Medication Sig Dispense Refill   • amLODIPine (NORVASC) 2.5 MG Tab Take 1 Tablet by mouth every evening. 90 Tablet 0   • aspirin (ASA) 81 MG Chew Tab chewable tablet Chew 81 mg every day.     • atorvastatin (LIPITOR) 40 MG Tab Take 1 tablet by mouth every day. 100 tablet 3   • hydroCHLOROthiazide (HYDRODIURIL) 25 MG Tab Take 1 tablet by mouth every day. 100 tablet 3   • lisinopril (PRINIVIL) 40 MG tablet Take 1 tablet by mouth every day. 100 tablet 3   • metFORMIN (GLUCOPHAGE) 500 MG Tab Take 1 tablet by mouth 2 times a day with meals. 200 tablet 3   • Coenzyme Q10 (CO Q 10 PO) Take  by mouth 2 Times a Day.     • therapeutic multivitamin-minerals (THERAGRAN-M) TABS Take 1 Tab by mouth every morning.       No current facility-administered medications for this visit.       Allergies, past medical history, past surgical history, medications, family history, social  "history reviewed and updated.    ROS   Constitutional: Denies fevers or chills  Eyes: Denies changes in vision  Ears/Nose/Throat/Mouth: Denies nasal congestion or sore throat   Cardiovascular: Denies chest pain or palpitations   Respiratory: Denies shortness of breath , Denies cough  Gastrointestinal/Hepatic: Denies abd pain, nausea, vomiting   Genitourinary: Denies dysuria or frequency  Musculoskeletal/Rheum: Denies joint pain and swelling   Neurological: Denies headache  Psychiatric: mood stable  Endocrine:  Diabetes Denies thyroid dysfunction  Heme/Oncology/Lymph Nodes: Denies weight changes or enlarged LNs.    Physical Exam  Vitals: /96 (BP Location: Left arm, Patient Position: Sitting, BP Cuff Size: Adult)   Pulse 65   Temp 36.7 °C (98 °F) (Temporal)   Resp 14   Ht 1.905 m (6' 3\")   Wt 104 kg (229 lb 4.5 oz)   SpO2 98%   BMI 28.66 kg/m²   General: Alert, pleasant, NAD  HEENT: Normocephalic.  EOMI, no icterus or pallor.  Conjunctivae and lids normal. External ears normal. Wearing a mask. Oropharynx non-erythematous, mucous membranes moist.  Neck supple.  No thyromegaly or masses palpated.   Lymph: No cervical or supraclavicular lymphadenopathy.  Cardiovascular: Regular rate and rhythm.   Respiratory: Normal respiratory effort.  Clear to auscultation bilaterally.  Abdomen: Non-distended, soft, non-tender  Skin: Warm, dry  Musculoskeletal: Gait is normal.  Moves all extremities well.  Extremities: No leg edema.    Psych:  Affect/mood is normal, judgement is good, memory is intact, grooming is appropriate.      Labs (10/14/21) were reviewed and discussed with patients.  All questions were answered.      Assessment and Plan    1. Controlled type 2 diabetes mellitus with other specified complication, without long-term current use of insulin (HCC)  - Microalbumin Creat Ratio Urine (Lab Collect); Future  - Diabetic Monofilament LE Exam  metformin  2. Essential hypertension  - amLODIPine (NORVASC) 2.5 MG " Tab; Take 1 Tablet by mouth every evening.  Dispense: 90 Tablet; Refill: 0    3. Vitamin D insufficiency  On replacement    4. Dyslipidemia  Healthful lifestyle measures  Statin    5. Need for vaccination  - INFLUENZA VACCINE, HIGH DOSE (65+ ONLY)        Follow-up:Return in about 6 weeks (around 11/30/2021), or if symptoms worsen or fail to improve.    This note was created using voice recognition software. There may be unintended errors in spelling, grammar or content.

## 2021-11-04 VITALS — DIASTOLIC BLOOD PRESSURE: 73 MMHG | SYSTOLIC BLOOD PRESSURE: 131 MMHG

## 2021-11-29 DIAGNOSIS — E11.69 CONTROLLED TYPE 2 DIABETES MELLITUS WITH OTHER SPECIFIED COMPLICATION, WITHOUT LONG-TERM CURRENT USE OF INSULIN (HCC): ICD-10-CM

## 2021-11-29 DIAGNOSIS — E78.5 DYSLIPIDEMIA: ICD-10-CM

## 2021-11-29 DIAGNOSIS — I10 ESSENTIAL HYPERTENSION: ICD-10-CM

## 2021-11-29 RX ORDER — AMLODIPINE BESYLATE 2.5 MG/1
2.5 TABLET ORAL EVERY EVENING
Qty: 100 TABLET | Refills: 3 | Status: SHIPPED | OUTPATIENT
Start: 2021-11-29 | End: 2022-04-21 | Stop reason: SDUPTHER

## 2021-11-30 ENCOUNTER — APPOINTMENT (OUTPATIENT)
Dept: MEDICAL GROUP | Facility: MEDICAL CENTER | Age: 71
End: 2021-11-30
Payer: MEDICARE

## 2021-11-30 NOTE — PROGRESS NOTES
Essential hypertension  -     amLODIPine (NORVASC) 2.5 MG Tab; Take 1 Tablet by mouth every evening.  -     CBC WITH DIFFERENTIAL; Future  -     Comp Metabolic Panel; Future  -     TSH WITH REFLEX TO FT4; Future    Controlled type 2 diabetes mellitus with other specified complication, without long-term current use of insulin (HCC)  -     Lipid Profile; Future  -     HEMOGLOBIN A1C; Future    Dyslipidemia  -     Lipid Profile; Future

## 2022-04-11 ENCOUNTER — HOSPITAL ENCOUNTER (OUTPATIENT)
Dept: LAB | Facility: MEDICAL CENTER | Age: 72
End: 2022-04-11
Attending: INTERNAL MEDICINE
Payer: MEDICARE

## 2022-04-11 DIAGNOSIS — E78.5 DYSLIPIDEMIA: ICD-10-CM

## 2022-04-11 DIAGNOSIS — E11.69 CONTROLLED TYPE 2 DIABETES MELLITUS WITH OTHER SPECIFIED COMPLICATION, WITHOUT LONG-TERM CURRENT USE OF INSULIN (HCC): ICD-10-CM

## 2022-04-11 DIAGNOSIS — I10 ESSENTIAL HYPERTENSION: ICD-10-CM

## 2022-04-11 LAB
ALBUMIN SERPL BCP-MCNC: 4.2 G/DL (ref 3.2–4.9)
ALBUMIN/GLOB SERPL: 1.6 G/DL
ALP SERPL-CCNC: 103 U/L (ref 30–99)
ALT SERPL-CCNC: 16 U/L (ref 2–50)
ANION GAP SERPL CALC-SCNC: 9 MMOL/L (ref 7–16)
AST SERPL-CCNC: 17 U/L (ref 12–45)
BASOPHILS # BLD AUTO: 0.9 % (ref 0–1.8)
BASOPHILS # BLD: 0.05 K/UL (ref 0–0.12)
BILIRUB SERPL-MCNC: 0.5 MG/DL (ref 0.1–1.5)
BUN SERPL-MCNC: 12 MG/DL (ref 8–22)
CALCIUM SERPL-MCNC: 9.8 MG/DL (ref 8.4–10.2)
CHLORIDE SERPL-SCNC: 98 MMOL/L (ref 96–112)
CHOLEST SERPL-MCNC: 92 MG/DL (ref 100–199)
CO2 SERPL-SCNC: 27 MMOL/L (ref 20–33)
CREAT SERPL-MCNC: 0.73 MG/DL (ref 0.5–1.4)
EOSINOPHIL # BLD AUTO: 0.14 K/UL (ref 0–0.51)
EOSINOPHIL NFR BLD: 2.5 % (ref 0–6.9)
ERYTHROCYTE [DISTWIDTH] IN BLOOD BY AUTOMATED COUNT: 44.9 FL (ref 35.9–50)
EST. AVERAGE GLUCOSE BLD GHB EST-MCNC: 131 MG/DL
FASTING STATUS PATIENT QL REPORTED: NORMAL
GFR SERPLBLD CREATININE-BSD FMLA CKD-EPI: 96 ML/MIN/1.73 M 2
GLOBULIN SER CALC-MCNC: 2.6 G/DL (ref 1.9–3.5)
GLUCOSE SERPL-MCNC: 113 MG/DL (ref 65–99)
HBA1C MFR BLD: 6.2 % (ref 4–5.6)
HCT VFR BLD AUTO: 45.7 % (ref 42–52)
HDLC SERPL-MCNC: 28 MG/DL
HGB BLD-MCNC: 15.4 G/DL (ref 14–18)
IMM GRANULOCYTES # BLD AUTO: 0 K/UL (ref 0–0.11)
IMM GRANULOCYTES NFR BLD AUTO: 0 % (ref 0–0.9)
LDLC SERPL CALC-MCNC: 53 MG/DL
LYMPHOCYTES # BLD AUTO: 1.75 K/UL (ref 1–4.8)
LYMPHOCYTES NFR BLD: 30.8 % (ref 22–41)
MCH RBC QN AUTO: 30.8 PG (ref 27–33)
MCHC RBC AUTO-ENTMCNC: 33.7 G/DL (ref 33.7–35.3)
MCV RBC AUTO: 91.4 FL (ref 81.4–97.8)
MONOCYTES # BLD AUTO: 0.59 K/UL (ref 0–0.85)
MONOCYTES NFR BLD AUTO: 10.4 % (ref 0–13.4)
NEUTROPHILS # BLD AUTO: 3.15 K/UL (ref 1.82–7.42)
NEUTROPHILS NFR BLD: 55.4 % (ref 44–72)
NRBC # BLD AUTO: 0 K/UL
NRBC BLD-RTO: 0 /100 WBC
PLATELET # BLD AUTO: 270 K/UL (ref 164–446)
PMV BLD AUTO: 10.2 FL (ref 9–12.9)
POTASSIUM SERPL-SCNC: 4.4 MMOL/L (ref 3.6–5.5)
PROT SERPL-MCNC: 6.8 G/DL (ref 6–8.2)
RBC # BLD AUTO: 5 M/UL (ref 4.7–6.1)
SODIUM SERPL-SCNC: 134 MMOL/L (ref 135–145)
TRIGL SERPL-MCNC: 57 MG/DL (ref 0–149)
TSH SERPL DL<=0.005 MIU/L-ACNC: 3.41 UIU/ML (ref 0.38–5.33)
WBC # BLD AUTO: 5.7 K/UL (ref 4.8–10.8)

## 2022-04-11 PROCEDURE — 36415 COLL VENOUS BLD VENIPUNCTURE: CPT

## 2022-04-11 PROCEDURE — 84443 ASSAY THYROID STIM HORMONE: CPT

## 2022-04-11 PROCEDURE — 83036 HEMOGLOBIN GLYCOSYLATED A1C: CPT

## 2022-04-11 PROCEDURE — 85025 COMPLETE CBC W/AUTO DIFF WBC: CPT

## 2022-04-11 PROCEDURE — 80053 COMPREHEN METABOLIC PANEL: CPT

## 2022-04-11 PROCEDURE — 80061 LIPID PANEL: CPT

## 2022-04-14 ENCOUNTER — APPOINTMENT (OUTPATIENT)
Dept: MEDICAL GROUP | Facility: MEDICAL CENTER | Age: 72
End: 2022-04-14
Payer: MEDICARE

## 2022-04-21 ENCOUNTER — OFFICE VISIT (OUTPATIENT)
Dept: MEDICAL GROUP | Facility: MEDICAL CENTER | Age: 72
End: 2022-04-21
Payer: MEDICARE

## 2022-04-21 VITALS
BODY MASS INDEX: 28.51 KG/M2 | SYSTOLIC BLOOD PRESSURE: 148 MMHG | HEIGHT: 75 IN | DIASTOLIC BLOOD PRESSURE: 78 MMHG | WEIGHT: 229.28 LBS | TEMPERATURE: 98.1 F | OXYGEN SATURATION: 97 % | RESPIRATION RATE: 16 BRPM | HEART RATE: 76 BPM

## 2022-04-21 DIAGNOSIS — I10 HTN (HYPERTENSION), BENIGN: Chronic | ICD-10-CM

## 2022-04-21 DIAGNOSIS — Z13.6 ENCOUNTER FOR ABDOMINAL AORTIC ANEURYSM (AAA) SCREENING: ICD-10-CM

## 2022-04-21 DIAGNOSIS — R74.8 ELEVATED ALKALINE PHOSPHATASE LEVEL: ICD-10-CM

## 2022-04-21 DIAGNOSIS — E78.5 DYSLIPIDEMIA: ICD-10-CM

## 2022-04-21 DIAGNOSIS — Z12.5 PROSTATE CANCER SCREENING: ICD-10-CM

## 2022-04-21 DIAGNOSIS — H26.9 CATARACT OF BOTH EYES, UNSPECIFIED CATARACT TYPE: ICD-10-CM

## 2022-04-21 DIAGNOSIS — E11.69 CONTROLLED TYPE 2 DIABETES MELLITUS WITH OTHER SPECIFIED COMPLICATION, WITHOUT LONG-TERM CURRENT USE OF INSULIN (HCC): ICD-10-CM

## 2022-04-21 DIAGNOSIS — D22.9 NUMEROUS SKIN MOLES: ICD-10-CM

## 2022-04-21 DIAGNOSIS — E55.9 VITAMIN D INSUFFICIENCY: ICD-10-CM

## 2022-04-21 DIAGNOSIS — I10 ESSENTIAL HYPERTENSION: ICD-10-CM

## 2022-04-21 DIAGNOSIS — I70.0 ATHEROSCLEROSIS OF ABDOMINAL AORTA (HCC): ICD-10-CM

## 2022-04-21 DIAGNOSIS — Z12.83 SKIN CANCER SCREENING: ICD-10-CM

## 2022-04-21 PROCEDURE — 99214 OFFICE O/P EST MOD 30 MIN: CPT | Performed by: INTERNAL MEDICINE

## 2022-04-21 RX ORDER — AMLODIPINE BESYLATE 2.5 MG/1
2.5 TABLET ORAL EVERY EVENING
Qty: 100 TABLET | Refills: 2 | Status: SHIPPED | OUTPATIENT
Start: 2022-04-21 | End: 2022-10-24

## 2022-04-21 RX ORDER — LISINOPRIL 40 MG/1
40 TABLET ORAL DAILY
Qty: 100 TABLET | Refills: 2 | Status: SHIPPED | OUTPATIENT
Start: 2022-04-21 | End: 2022-10-24 | Stop reason: SDUPTHER

## 2022-04-21 RX ORDER — HYDROCHLOROTHIAZIDE 25 MG/1
25 TABLET ORAL DAILY
Qty: 100 TABLET | Refills: 2 | Status: SHIPPED | OUTPATIENT
Start: 2022-04-21 | End: 2022-10-24 | Stop reason: SDUPTHER

## 2022-04-21 RX ORDER — ATORVASTATIN CALCIUM 40 MG/1
40 TABLET, FILM COATED ORAL EVERY EVENING
Qty: 100 TABLET | Refills: 2 | Status: SHIPPED | OUTPATIENT
Start: 2022-04-21 | End: 2022-10-24

## 2022-04-21 ASSESSMENT — FIBROSIS 4 INDEX: FIB4 SCORE: 1.15

## 2022-04-21 ASSESSMENT — PATIENT HEALTH QUESTIONNAIRE - PHQ9: CLINICAL INTERPRETATION OF PHQ2 SCORE: 0

## 2022-04-21 NOTE — PROGRESS NOTES
Annual Health Assessment Questions:    1.  Are you currently engaging in any exercise or physical activity? Yes    2.  How would you describe your mood or emotional well-being today? good    3.  Have you had any falls in the last year? No    4.  Have you noticed any problems with your balance or had difficulty walking? No    5.  In the last six months have you experienced any leakage of urine? No    6. DPA/Advanced Directive: Patient has Advanced Directive on file.        Established Patient    Matthew Perez is a 72 y.o. male who presents today with the following:    CC:   Chief Complaint   Patient presents with   • Medication Refill   • Follow-Up   • Lab Results       HPI:       Problem   Vitamin D Insufficiency    Stable on replacement     Atherosclerosis of Abdominal Aorta (Hcc)    Presume from previous PCP note.  Continue risk reduction  Statin  Check AAA screen given hx of smoking      Dyslipidemia    Stable on Lipitor     Controlled Type 2 Diabetes Mellitus, Without Long-Term Current Use of Insulin (Hcc)    Stable on metformin  He has DLD on statin     Ref. Range 4/11/2022 09:35   Glycohemoglobin Latest Ref Range: 4.0 - 5.6 % 6.2 (H)   Estim. Avg Glu Latest Units: mg/dL 131      Ref. Range 10/14/2021 09:32   Glycohemoglobin Latest Ref Range: 4.0 - 5.6 % 6.5 (H)   Estim. Avg Glu Latest Units: mg/dL 140      Ref. Range 4/13/2021 09:47   Glycohemoglobin Latest Ref Range: 4.0 - 5.6 % 6.2 (H)   Estim. Avg Glu Latest Units: mg/dL 131      Ref. Range 7/16/2020 09:15   Glycohemoglobin Latest Ref Range: 0.0 - 5.6 % 6.3 (H)      cataracts following with optometry    10/19/21  Monofilament testing with a 10 gram force: sensation intact: intact bilaterally  Visual Inspection: Feet without maceration, ulcers, fissures.  Pedal pulses: normal       Cataract of Both Eyes    Following with eye doctor     Essential Hypertension    Stable on lisinopril, HCTZ            Current Outpatient Medications   Medication Sig  "Dispense Refill   • metFORMIN (GLUCOPHAGE) 500 MG Tab Take 1 Tablet by mouth 2 times a day with meals. 200 Tablet 2   • lisinopril (PRINIVIL) 40 MG tablet Take 1 Tablet by mouth every day. 100 Tablet 2   • hydroCHLOROthiazide (HYDRODIURIL) 25 MG Tab Take 1 Tablet by mouth every day. 100 Tablet 2   • atorvastatin (LIPITOR) 40 MG Tab Take 1 Tablet by mouth every evening. 100 Tablet 2   • amLODIPine (NORVASC) 2.5 MG Tab Take 1 Tablet by mouth every evening. 100 Tablet 2   • aspirin (ASA) 81 MG Chew Tab chewable tablet Chew 81 mg every day.     • Coenzyme Q10 (CO Q 10 PO) Take  by mouth 2 Times a Day.     • therapeutic multivitamin-minerals (THERAGRAN-M) TABS Take 1 Tab by mouth every morning.     • atorvastatin (LIPITOR) 40 MG Tab Take 1 Tablet by mouth every evening. 100 Tablet 2   • amLODIPine (NORVASC) 2.5 MG Tab Take 1 Tablet by mouth every evening. 100 Tablet 2     No current facility-administered medications for this visit.       Allergies, past medical history, past surgical history, medications, family history, social history reviewed and updated.    ROS   Constitutional: Denies fevers or chills  Eyes: Denies changes in vision  Ears/Nose/Throat/Mouth: Denies nasal congestion or sore throat   Cardiovascular: Denies chest pain or palpitations   Respiratory: Denies shortness of breath , Denies cough  Gastrointestinal/Hepatic: Denies abd pain, nausea, vomiting   Genitourinary: Denies dysuria or frequency  Musculoskeletal/Rheum: Denies joint pain and swelling   Neurological: Denies headache  Psychiatric: Denies mood disorder   Endocrine: Denies hx of diabetes or thyroid dysfunction  Heme/Oncology/Lymph Nodes: Denies weight changes or enlarged LNs.    Physical Exam  Vitals: /78 (BP Location: Left arm, Patient Position: Sitting, BP Cuff Size: Adult)   Pulse 76   Temp 36.7 °C (98.1 °F) (Temporal)   Resp 16   Ht 1.905 m (6' 3\")   Wt 104 kg (229 lb 4.5 oz)   SpO2 97%   BMI 28.66 kg/m²   General: Alert, " pleasant, NAD  HEENT: Normocephalic.  EOMI, no icterus or pallor.  Conjunctivae and lids normal. External ears normal. Wearing a mask. Oropharynx non-erythematous, mucous membranes moist.  Neck supple.  No thyromegaly or masses palpated.   Lymph: No cervical or supraclavicular lymphadenopathy.  Cardiovascular: Regular rate and rhythm.    Respiratory: Normal respiratory effort.  Clear to auscultation bilaterally.  Abdomen: Non-distended, soft, non-tender  Skin: Warm, dry, sun damaged skin on scalp and face. Multiple moles on trunk  Musculoskeletal: Gait is normal.  Moves all extremities well.  Extremities: No leg edema.    Psych:  Affect/mood is normal, judgement is good, memory is intact, grooming is appropriate.      Labs (4/11/22) were reviewed and discussed with patients.  All questions were answered.      Assessment and Plan    1. Controlled type 2 diabetes mellitus with other specified complication, without long-term current use of insulin (HCC)  - HEMOGLOBIN A1C; Future  - Comp Metabolic Panel; Future  - metFORMIN (GLUCOPHAGE) 500 MG Tab; Take 1 Tablet by mouth 2 times a day with meals.  Dispense: 200 Tablet; Refill: 2    2. Essential hypertension  - Comp Metabolic Panel; Future  - amLODIPine (NORVASC) 2.5 MG Tab; Take 1 Tablet by mouth every evening.  Dispense: 100 Tablet; Refill: 2    3. Elevated alkaline phosphatase level  - ALKALINE PHOSPHATASE ISOENZYMES; Future    4. Prostate cancer screening  - PROSTATE SPECIFIC AG SCREENING; Future    5. Skin cancer screening  - Referral to Dermatology    6. Numerous skin moles  - Referral to Dermatology    7. HTN (hypertension), benign  - lisinopril (PRINIVIL) 40 MG tablet; Take 1 Tablet by mouth every day.  Dispense: 100 Tablet; Refill: 2  - hydroCHLOROthiazide (HYDRODIURIL) 25 MG Tab; Take 1 Tablet by mouth every day.  Dispense: 100 Tablet; Refill: 2    8. Dyslipidemia  - atorvastatin (LIPITOR) 40 MG Tab; Take 1 Tablet by mouth every evening.  Dispense: 100 Tablet;  Refill: 2    9. Atherosclerosis of abdominal aorta (HCC)  - atorvastatin (LIPITOR) 40 MG Tab; Take 1 Tablet by mouth every evening.  Dispense: 100 Tablet; Refill: 2  - US-ABDOMINAL AORTA SCREEN (AAA); Future    10. Cataract of both eyes, unspecified cataract type  Follow with eye doctor    11. Encounter for abdominal aortic aneurysm (AAA) screening  Hx of smoking  - US-ABDOMINAL AORTA SCREEN (AAA); Future          Follow-up:Return in about 6 months (around 10/21/2022).    This note was created using voice recognition software. There may be unintended errors in spelling, grammar or content.

## 2022-04-21 NOTE — ASSESSMENT & PLAN NOTE
Presume from previous PCP note.  Continue risk reduction  Statin  Check AAA screen given hx of smoking

## 2022-04-25 ENCOUNTER — PHARMACY VISIT (OUTPATIENT)
Dept: PHARMACY | Facility: MEDICAL CENTER | Age: 72
End: 2022-04-25
Payer: MEDICARE

## 2022-04-25 PROCEDURE — RXMED WILLOW AMBULATORY MEDICATION CHARGE: Performed by: INTERNAL MEDICINE

## 2022-04-25 RX ORDER — ATORVASTATIN CALCIUM 40 MG/1
40 TABLET, FILM COATED ORAL EVERY EVENING
Qty: 100 TABLET | Refills: 2 | Status: SHIPPED | OUTPATIENT
Start: 2022-04-21 | End: 2022-10-24 | Stop reason: SDUPTHER

## 2022-04-25 RX ORDER — AMLODIPINE BESYLATE 2.5 MG/1
2.5 TABLET ORAL EVERY EVENING
Qty: 100 TABLET | Refills: 2 | Status: SHIPPED | OUTPATIENT
Start: 2022-04-21 | End: 2022-10-24 | Stop reason: SDUPTHER

## 2022-05-04 ENCOUNTER — APPOINTMENT (RX ONLY)
Dept: URBAN - METROPOLITAN AREA CLINIC 6 | Facility: CLINIC | Age: 72
Setting detail: DERMATOLOGY
End: 2022-05-04

## 2022-05-04 DIAGNOSIS — D22 MELANOCYTIC NEVI: ICD-10-CM

## 2022-05-04 DIAGNOSIS — L82.1 OTHER SEBORRHEIC KERATOSIS: ICD-10-CM

## 2022-05-04 DIAGNOSIS — L21.8 OTHER SEBORRHEIC DERMATITIS: ICD-10-CM

## 2022-05-04 DIAGNOSIS — Z71.89 OTHER SPECIFIED COUNSELING: ICD-10-CM

## 2022-05-04 DIAGNOSIS — L81.4 OTHER MELANIN HYPERPIGMENTATION: ICD-10-CM

## 2022-05-04 DIAGNOSIS — D18.0 HEMANGIOMA: ICD-10-CM

## 2022-05-04 DIAGNOSIS — L57.0 ACTINIC KERATOSIS: ICD-10-CM

## 2022-05-04 PROBLEM — D22.5 MELANOCYTIC NEVI OF TRUNK: Status: ACTIVE | Noted: 2022-05-04

## 2022-05-04 PROBLEM — D18.01 HEMANGIOMA OF SKIN AND SUBCUTANEOUS TISSUE: Status: ACTIVE | Noted: 2022-05-04

## 2022-05-04 PROCEDURE — 17000 DESTRUCT PREMALG LESION: CPT

## 2022-05-04 PROCEDURE — ? COUNSELING

## 2022-05-04 PROCEDURE — 17003 DESTRUCT PREMALG LES 2-14: CPT

## 2022-05-04 PROCEDURE — 99203 OFFICE O/P NEW LOW 30 MIN: CPT | Mod: 25

## 2022-05-04 PROCEDURE — ? REFERRAL CORRESPONDENCE

## 2022-05-04 PROCEDURE — ? ADDITIONAL NOTES

## 2022-05-04 PROCEDURE — ? SUNSCREEN RECOMMENDATIONS

## 2022-05-04 PROCEDURE — ? LIQUID NITROGEN

## 2022-05-04 ASSESSMENT — LOCATION SIMPLE DESCRIPTION DERM
LOCATION SIMPLE: RIGHT FOREHEAD
LOCATION SIMPLE: UPPER BACK
LOCATION SIMPLE: ABDOMEN
LOCATION SIMPLE: RIGHT EAR
LOCATION SIMPLE: POSTERIOR SCALP
LOCATION SIMPLE: LEFT EAR
LOCATION SIMPLE: LEFT SCALP
LOCATION SIMPLE: INFERIOR FOREHEAD
LOCATION SIMPLE: CHEST

## 2022-05-04 ASSESSMENT — LOCATION DETAILED DESCRIPTION DERM
LOCATION DETAILED: SUPERIOR THORACIC SPINE
LOCATION DETAILED: RIGHT INFERIOR CRUS OF ANTIHELIX
LOCATION DETAILED: RIGHT FOREHEAD
LOCATION DETAILED: INFERIOR THORACIC SPINE
LOCATION DETAILED: LEFT CENTRAL FRONTAL SCALP
LOCATION DETAILED: EPIGASTRIC SKIN
LOCATION DETAILED: INFERIOR MID FOREHEAD
LOCATION DETAILED: POSTERIOR MID-PARIETAL SCALP
LOCATION DETAILED: STERNUM
LOCATION DETAILED: LEFT INFERIOR CRUS OF ANTIHELIX

## 2022-05-04 ASSESSMENT — LOCATION ZONE DERM
LOCATION ZONE: FACE
LOCATION ZONE: TRUNK
LOCATION ZONE: SCALP
LOCATION ZONE: EAR

## 2022-05-04 NOTE — PROCEDURE: LIQUID NITROGEN
Post-Care Instructions: I reviewed with the patient in detail post-care instructions. Patient is to wear sunprotection, and avoid picking at any of the treated lesions. Pt may apply Vaseline to crusted or scabbing areas.
Duration Of Freeze Thaw-Cycle (Seconds): 8
Show Applicator Variable?: Yes
Detail Level: Zone
Render Post-Care Instructions In Note?: no
Number Of Freeze-Thaw Cycles: 2 freeze-thaw cycles
Consent: The patient's consent was obtained including but not limited to risks of crusting, scabbing, blistering, scarring, darker or lighter pigmentary change, recurrence, incomplete removal and infection.

## 2022-05-04 NOTE — PROCEDURE: ADDITIONAL NOTES
Detail Level: Detailed
Additional Notes: Patient defers prescription therapy at today’s visit.
Render Risk Assessment In Note?: no

## 2022-07-21 PROCEDURE — RXMED WILLOW AMBULATORY MEDICATION CHARGE: Performed by: INTERNAL MEDICINE

## 2022-07-22 ENCOUNTER — PHARMACY VISIT (OUTPATIENT)
Dept: PHARMACY | Facility: MEDICAL CENTER | Age: 72
End: 2022-07-22
Payer: MEDICARE

## 2022-08-02 ENCOUNTER — TELEPHONE (OUTPATIENT)
Dept: HEALTH INFORMATION MANAGEMENT | Facility: OTHER | Age: 72
End: 2022-08-02
Payer: MEDICARE

## 2022-10-06 ENCOUNTER — TELEPHONE (OUTPATIENT)
Dept: MEDICAL GROUP | Facility: MEDICAL CENTER | Age: 72
End: 2022-10-06
Payer: MEDICARE

## 2022-10-11 ENCOUNTER — PHARMACY VISIT (OUTPATIENT)
Dept: PHARMACY | Facility: MEDICAL CENTER | Age: 72
End: 2022-10-11
Payer: COMMERCIAL

## 2022-10-11 PROCEDURE — RXMED WILLOW AMBULATORY MEDICATION CHARGE: Performed by: INTERNAL MEDICINE

## 2022-10-17 ENCOUNTER — DOCUMENTATION (OUTPATIENT)
Dept: HEALTH INFORMATION MANAGEMENT | Facility: OTHER | Age: 72
End: 2022-10-17
Payer: MEDICARE

## 2022-10-18 ENCOUNTER — HOSPITAL ENCOUNTER (OUTPATIENT)
Dept: LAB | Facility: MEDICAL CENTER | Age: 72
End: 2022-10-18
Attending: INTERNAL MEDICINE
Payer: MEDICARE

## 2022-10-18 DIAGNOSIS — I10 ESSENTIAL HYPERTENSION: ICD-10-CM

## 2022-10-18 DIAGNOSIS — Z12.5 PROSTATE CANCER SCREENING: ICD-10-CM

## 2022-10-18 DIAGNOSIS — E11.69 CONTROLLED TYPE 2 DIABETES MELLITUS WITH OTHER SPECIFIED COMPLICATION, WITHOUT LONG-TERM CURRENT USE OF INSULIN (HCC): ICD-10-CM

## 2022-10-18 DIAGNOSIS — R74.8 ELEVATED ALKALINE PHOSPHATASE LEVEL: ICD-10-CM

## 2022-10-18 LAB
ALBUMIN SERPL BCP-MCNC: 4.2 G/DL (ref 3.2–4.9)
ALBUMIN/GLOB SERPL: 1.4 G/DL
ALP SERPL-CCNC: 105 U/L (ref 30–99)
ALT SERPL-CCNC: 19 U/L (ref 2–50)
ANION GAP SERPL CALC-SCNC: 12 MMOL/L (ref 7–16)
AST SERPL-CCNC: 18 U/L (ref 12–45)
BILIRUB SERPL-MCNC: 0.5 MG/DL (ref 0.1–1.5)
BUN SERPL-MCNC: 13 MG/DL (ref 8–22)
CALCIUM SERPL-MCNC: 10 MG/DL (ref 8.4–10.2)
CHLORIDE SERPL-SCNC: 97 MMOL/L (ref 96–112)
CO2 SERPL-SCNC: 28 MMOL/L (ref 20–33)
CREAT SERPL-MCNC: 0.75 MG/DL (ref 0.5–1.4)
EST. AVERAGE GLUCOSE BLD GHB EST-MCNC: 131 MG/DL
FASTING STATUS PATIENT QL REPORTED: NORMAL
GFR SERPLBLD CREATININE-BSD FMLA CKD-EPI: 95 ML/MIN/1.73 M 2
GLOBULIN SER CALC-MCNC: 3 G/DL (ref 1.9–3.5)
GLUCOSE SERPL-MCNC: 108 MG/DL (ref 65–99)
HBA1C MFR BLD: 6.2 % (ref 4–5.6)
POTASSIUM SERPL-SCNC: 4 MMOL/L (ref 3.6–5.5)
PROT SERPL-MCNC: 7.2 G/DL (ref 6–8.2)
PSA SERPL-MCNC: 2.71 NG/ML (ref 0–4)
SODIUM SERPL-SCNC: 137 MMOL/L (ref 135–145)

## 2022-10-18 PROCEDURE — 36415 COLL VENOUS BLD VENIPUNCTURE: CPT

## 2022-10-18 PROCEDURE — 84153 ASSAY OF PSA TOTAL: CPT

## 2022-10-18 PROCEDURE — 84080 ASSAY ALKALINE PHOSPHATASES: CPT

## 2022-10-18 PROCEDURE — 83036 HEMOGLOBIN GLYCOSYLATED A1C: CPT

## 2022-10-18 PROCEDURE — 84075 ASSAY ALKALINE PHOSPHATASE: CPT

## 2022-10-18 PROCEDURE — 80053 COMPREHEN METABOLIC PANEL: CPT

## 2022-10-20 LAB
ALP BONE SERPL-CCNC: 37 U/L (ref 0–55)
ALP ISOS SERPL HS-CCNC: 0 U/L
ALP LIVER SERPL-CCNC: 67 U/L (ref 0–94)
ALP SERPL-CCNC: 104 U/L (ref 40–120)

## 2022-10-21 SDOH — HEALTH STABILITY: PHYSICAL HEALTH: ON AVERAGE, HOW MANY MINUTES DO YOU ENGAGE IN EXERCISE AT THIS LEVEL?: 20 MIN

## 2022-10-21 SDOH — ECONOMIC STABILITY: INCOME INSECURITY: IN THE LAST 12 MONTHS, WAS THERE A TIME WHEN YOU WERE NOT ABLE TO PAY THE MORTGAGE OR RENT ON TIME?: NO

## 2022-10-21 SDOH — ECONOMIC STABILITY: INCOME INSECURITY: HOW HARD IS IT FOR YOU TO PAY FOR THE VERY BASICS LIKE FOOD, HOUSING, MEDICAL CARE, AND HEATING?: NOT HARD AT ALL

## 2022-10-21 SDOH — HEALTH STABILITY: PHYSICAL HEALTH: ON AVERAGE, HOW MANY DAYS PER WEEK DO YOU ENGAGE IN MODERATE TO STRENUOUS EXERCISE (LIKE A BRISK WALK)?: 5 DAYS

## 2022-10-21 SDOH — ECONOMIC STABILITY: FOOD INSECURITY: WITHIN THE PAST 12 MONTHS, YOU WORRIED THAT YOUR FOOD WOULD RUN OUT BEFORE YOU GOT MONEY TO BUY MORE.: NEVER TRUE

## 2022-10-21 SDOH — ECONOMIC STABILITY: HOUSING INSECURITY: IN THE LAST 12 MONTHS, HOW MANY PLACES HAVE YOU LIVED?: 1

## 2022-10-21 SDOH — ECONOMIC STABILITY: HOUSING INSECURITY
IN THE LAST 12 MONTHS, WAS THERE A TIME WHEN YOU DID NOT HAVE A STEADY PLACE TO SLEEP OR SLEPT IN A SHELTER (INCLUDING NOW)?: NO

## 2022-10-21 SDOH — ECONOMIC STABILITY: FOOD INSECURITY: WITHIN THE PAST 12 MONTHS, THE FOOD YOU BOUGHT JUST DIDN'T LAST AND YOU DIDN'T HAVE MONEY TO GET MORE.: NEVER TRUE

## 2022-10-21 SDOH — ECONOMIC STABILITY: TRANSPORTATION INSECURITY
IN THE PAST 12 MONTHS, HAS THE LACK OF TRANSPORTATION KEPT YOU FROM MEDICAL APPOINTMENTS OR FROM GETTING MEDICATIONS?: NO

## 2022-10-21 SDOH — ECONOMIC STABILITY: TRANSPORTATION INSECURITY
IN THE PAST 12 MONTHS, HAS LACK OF TRANSPORTATION KEPT YOU FROM MEETINGS, WORK, OR FROM GETTING THINGS NEEDED FOR DAILY LIVING?: NO

## 2022-10-21 SDOH — ECONOMIC STABILITY: TRANSPORTATION INSECURITY
IN THE PAST 12 MONTHS, HAS LACK OF RELIABLE TRANSPORTATION KEPT YOU FROM MEDICAL APPOINTMENTS, MEETINGS, WORK OR FROM GETTING THINGS NEEDED FOR DAILY LIVING?: NO

## 2022-10-21 SDOH — HEALTH STABILITY: MENTAL HEALTH
STRESS IS WHEN SOMEONE FEELS TENSE, NERVOUS, ANXIOUS, OR CAN'T SLEEP AT NIGHT BECAUSE THEIR MIND IS TROUBLED. HOW STRESSED ARE YOU?: NOT AT ALL

## 2022-10-21 ASSESSMENT — SOCIAL DETERMINANTS OF HEALTH (SDOH)
IN A TYPICAL WEEK, HOW MANY TIMES DO YOU TALK ON THE PHONE WITH FAMILY, FRIENDS, OR NEIGHBORS?: NEVER
DO YOU BELONG TO ANY CLUBS OR ORGANIZATIONS SUCH AS CHURCH GROUPS UNIONS, FRATERNAL OR ATHLETIC GROUPS, OR SCHOOL GROUPS?: NO
HOW OFTEN DO YOU GET TOGETHER WITH FRIENDS OR RELATIVES?: NEVER
HOW OFTEN DO YOU ATTEND CHURCH OR RELIGIOUS SERVICES?: NEVER
IN A TYPICAL WEEK, HOW MANY TIMES DO YOU TALK ON THE PHONE WITH FAMILY, FRIENDS, OR NEIGHBORS?: NEVER
DO YOU BELONG TO ANY CLUBS OR ORGANIZATIONS SUCH AS CHURCH GROUPS UNIONS, FRATERNAL OR ATHLETIC GROUPS, OR SCHOOL GROUPS?: NO
HOW OFTEN DO YOU HAVE SIX OR MORE DRINKS ON ONE OCCASION: NEVER
HOW OFTEN DO YOU HAVE A DRINK CONTAINING ALCOHOL: NEVER
HOW OFTEN DO YOU GET TOGETHER WITH FRIENDS OR RELATIVES?: NEVER
HOW HARD IS IT FOR YOU TO PAY FOR THE VERY BASICS LIKE FOOD, HOUSING, MEDICAL CARE, AND HEATING?: NOT HARD AT ALL
WITHIN THE PAST 12 MONTHS, YOU WORRIED THAT YOUR FOOD WOULD RUN OUT BEFORE YOU GOT THE MONEY TO BUY MORE: NEVER TRUE
HOW MANY DRINKS CONTAINING ALCOHOL DO YOU HAVE ON A TYPICAL DAY WHEN YOU ARE DRINKING: PATIENT DOES NOT DRINK
HOW OFTEN DO YOU ATTENT MEETINGS OF THE CLUB OR ORGANIZATION YOU BELONG TO?: NEVER
HOW OFTEN DO YOU ATTEND CHURCH OR RELIGIOUS SERVICES?: NEVER
HOW OFTEN DO YOU ATTENT MEETINGS OF THE CLUB OR ORGANIZATION YOU BELONG TO?: NEVER

## 2022-10-21 ASSESSMENT — LIFESTYLE VARIABLES
AUDIT-C TOTAL SCORE: 0
HOW OFTEN DO YOU HAVE SIX OR MORE DRINKS ON ONE OCCASION: NEVER
SKIP TO QUESTIONS 9-10: 1
HOW MANY STANDARD DRINKS CONTAINING ALCOHOL DO YOU HAVE ON A TYPICAL DAY: PATIENT DOES NOT DRINK
HOW OFTEN DO YOU HAVE A DRINK CONTAINING ALCOHOL: NEVER

## 2022-10-24 ENCOUNTER — HOSPITAL ENCOUNTER (OUTPATIENT)
Facility: MEDICAL CENTER | Age: 72
End: 2022-10-24
Attending: INTERNAL MEDICINE
Payer: MEDICARE

## 2022-10-24 ENCOUNTER — OFFICE VISIT (OUTPATIENT)
Dept: MEDICAL GROUP | Facility: MEDICAL CENTER | Age: 72
End: 2022-10-24
Payer: MEDICARE

## 2022-10-24 VITALS
HEIGHT: 75 IN | DIASTOLIC BLOOD PRESSURE: 68 MMHG | BODY MASS INDEX: 28.23 KG/M2 | WEIGHT: 227.07 LBS | TEMPERATURE: 97.3 F | SYSTOLIC BLOOD PRESSURE: 132 MMHG | HEART RATE: 76 BPM | OXYGEN SATURATION: 97 %

## 2022-10-24 DIAGNOSIS — I10 ESSENTIAL HYPERTENSION: ICD-10-CM

## 2022-10-24 DIAGNOSIS — E11.69 CONTROLLED TYPE 2 DIABETES MELLITUS WITH OTHER SPECIFIED COMPLICATION, WITHOUT LONG-TERM CURRENT USE OF INSULIN (HCC): ICD-10-CM

## 2022-10-24 DIAGNOSIS — Z12.11 COLON CANCER SCREENING: ICD-10-CM

## 2022-10-24 DIAGNOSIS — Z87.891 FORMER SMOKER: ICD-10-CM

## 2022-10-24 DIAGNOSIS — I10 HTN (HYPERTENSION), BENIGN: Chronic | ICD-10-CM

## 2022-10-24 DIAGNOSIS — E78.5 DYSLIPIDEMIA: ICD-10-CM

## 2022-10-24 PROBLEM — E11.9 CONTROLLED TYPE 2 DIABETES MELLITUS, WITHOUT LONG-TERM CURRENT USE OF INSULIN (HCC): Chronic | Status: ACTIVE | Noted: 2017-04-06

## 2022-10-24 LAB
CREAT UR-MCNC: 34.52 MG/DL
MICROALBUMIN UR-MCNC: <1.2 MG/DL
MICROALBUMIN/CREAT UR: NORMAL MG/G (ref 0–30)

## 2022-10-24 PROCEDURE — RXMED WILLOW AMBULATORY MEDICATION CHARGE: Performed by: INTERNAL MEDICINE

## 2022-10-24 PROCEDURE — 99214 OFFICE O/P EST MOD 30 MIN: CPT | Performed by: INTERNAL MEDICINE

## 2022-10-24 PROCEDURE — 82043 UR ALBUMIN QUANTITATIVE: CPT

## 2022-10-24 PROCEDURE — 82570 ASSAY OF URINE CREATININE: CPT

## 2022-10-24 RX ORDER — AMLODIPINE BESYLATE 2.5 MG/1
2.5 TABLET ORAL EVERY EVENING
Qty: 100 TABLET | Refills: 3 | Status: SHIPPED | OUTPATIENT
Start: 2022-10-24 | End: 2023-10-24 | Stop reason: SDUPTHER

## 2022-10-24 RX ORDER — HYDROCHLOROTHIAZIDE 25 MG/1
25 TABLET ORAL DAILY
Qty: 100 TABLET | Refills: 3 | Status: SHIPPED | OUTPATIENT
Start: 2022-10-24 | End: 2023-10-25 | Stop reason: SDUPTHER

## 2022-10-24 RX ORDER — ATORVASTATIN CALCIUM 40 MG/1
40 TABLET, FILM COATED ORAL EVERY EVENING
Qty: 100 TABLET | Refills: 3 | Status: SHIPPED | OUTPATIENT
Start: 2022-10-24 | End: 2023-10-25 | Stop reason: SDUPTHER

## 2022-10-24 RX ORDER — LISINOPRIL 40 MG/1
40 TABLET ORAL DAILY
Qty: 100 TABLET | Refills: 3 | Status: SHIPPED | OUTPATIENT
Start: 2022-10-24 | End: 2023-10-25 | Stop reason: SDUPTHER

## 2022-10-24 ASSESSMENT — FIBROSIS 4 INDEX: FIB4 SCORE: 1.12

## 2022-10-24 ASSESSMENT — ENCOUNTER SYMPTOMS
CHILLS: 0
FEVER: 0
BLURRED VISION: 0
DEPRESSION: 0
HEARTBURN: 0
SHORTNESS OF BREATH: 0

## 2022-10-24 NOTE — ASSESSMENT & PLAN NOTE
Low-sodium diet, healthful diet measures.  Continue current regimen amlodipine 2.5, hydrochlorothiazide 25 mg and lisinopril 40 mg tablet daily.

## 2022-10-24 NOTE — PROGRESS NOTES
Subjective:     Chief Complaint   Patient presents with    Establish Care        Diagnoses of Controlled type 2 diabetes mellitus with other specified complication, without long-term current use of insulin (HCC), Essential hypertension, HTN (hypertension), benign, Colon cancer screening, Former smoker, and Dyslipidemia were pertinent to this visit.    HISTORY OF THE PRESENT ILLNESS: Patient is a 72 y.o. male. This pleasant patient is here today to establish care. His prior PCP was Dr. Aquino.  He is feeling well and denies active complaints.      Problem   Former Smoker    Obtain AAA screening.     Dyslipidemia    Controlled on Lipitor 40 mg daily.  Lab Results   Component Value Date/Time    CHOLSTRLTOT 92 (L) 04/11/2022 09:35 AM    LDL 53 04/11/2022 09:35 AM    HDL 28 (A) 04/11/2022 09:35 AM    TRIGLYCERIDE 57 04/11/2022 09:35 AM            Controlled Type 2 Diabetes Mellitus, Without Long-Term Current Use of Insulin (Hcc)    This is a chronic problem, well-controlled on metformin 500 mg twice daily.    Lab Results   Component Value Date/Time    HBA1C 6.2 (H) 10/18/2022 09:17 AM      Last Microalb/Cr ratio:   Lab Results   Component Value Date/Time    URCREAT 53.78 10/19/2021 1330    MICROALBUR <1.2 10/19/2021 1330    MALBCRT see below 10/19/2021 1330     Last diabetic foot exam: April 2022  Last retinal eye exam: cataracts following with optometry  ACEi/ARB: Lisinopril 40 mg daily  Statin: Atorvastatin 40 mg daily  LDL: At goal  Aspirin: 81 mg daily  Concomitant HTN:  controlled            Essential Hypertension    Chronic, controlled on amlodipine 2.5, hydrochlorothiazide 25 mg and lisinopril 40 mg tablet daily.  Low-sodium diet, healthful measures.       Past Medical History:   Diagnosis Date    CAD (coronary artery disease) 3/22/2010    CATARACT 3/28/2012    Cataract, diabetic (HCC) 4/2/2015    Dyslipidemia 3/22/2010    High cholesterol     HTN (hypertension), benign 3/22/2010    Hypertension     MEDICAL HOME      Type II or unspecified type diabetes mellitus without mention of complication, not stated as uncontrolled     oral meds     Past Surgical History:   Procedure Laterality Date    PB WRIST ARTHROSCOP,RELEASE XVERS LIG Right 2019    Procedure: RELEASE, CARPAL TUNNEL, ENDOSCOPIC;  Surgeon: Titus Lopez M.D.;  Location: SURGERY [facility];  Service: Orthopedics    LA NEUROPLASTY & OR TRANSPOS ULNAR NERVE ELBOW  2019    Procedure: RELEASE, CUBITAL TUNNEL;  Surgeon: Titus Lopez M.D.;  Location: SURGERY [facility];  Service: Orthopedics    HIP ARTHROPLASTY TOTAL  2015    Performed by Ziyad Tang M.D. at SURGERY [facility]    HIP ARTHROPLASTY TOTAL  10/21/2014    Performed by Ziyad Tang M.D. at SURGERY [facility]    KNEE ARTHROSCOPY Right     EYE SURGERY      TONSILLECTOMY       Family History   Problem Relation Age of Onset    Stroke Mother 75    Heart Disease Father         bypass    Heart Disease Brother 59        MI, smoker    Heart Disease Maternal Uncle     Heart Disease Paternal Uncle     Stroke Maternal Grandmother     Heart Disease Maternal Grandfather         MI    Diabetes Paternal Grandmother         type 1    Cancer Paternal Grandmother         stomach    Heart Disease Paternal Grandfather         MI     Social History     Tobacco Use    Smoking status: Former     Packs/day: 1.00     Years: 12.00     Pack years: 12.00     Types: Cigarettes     Quit date: 1979     Years since quittin.8    Smokeless tobacco: Never   Vaping Use    Vaping Use: Never used   Substance Use Topics    Alcohol use: Not Currently     Alcohol/week: 0.6 oz     Types: 1 Glasses of wine per week     Comment: 1 glass/week    Drug use: No     Current Outpatient Medications Ordered in Epic   Medication Sig Dispense Refill    amLODIPine (NORVASC) 2.5 MG Tab Take 1 Tablet by mouth every evening. 100 Tablet 3    atorvastatin (LIPITOR) 40 MG Tab Take 1 Tablet by  "mouth every evening. 100 Tablet 3    hydroCHLOROthiazide (HYDRODIURIL) 25 MG Tab Take 1 Tablet by mouth every day. 100 Tablet 3    lisinopril (PRINIVIL) 40 MG tablet Take 1 Tablet by mouth every day. 100 Tablet 3    metFORMIN (GLUCOPHAGE) 500 MG Tab Take 1 Tablet by mouth 2 times a day with meals. 200 Tablet 3    aspirin (ASA) 81 MG Chew Tab chewable tablet Chew 81 mg every day.      Coenzyme Q10 (CO Q 10 PO) Take  by mouth 2 Times a Day.      therapeutic multivitamin-minerals (THERAGRAN-M) TABS Take 1 Tab by mouth every morning.       No current [facility]-ordered facility-administered medications on file.     Review of Systems   Constitutional:  Negative for chills and fever.   Eyes:  Negative for blurred vision.   Respiratory:  Negative for shortness of breath.    Cardiovascular:  Negative for chest pain.   Gastrointestinal:  Negative for heartburn.   Psychiatric/Behavioral:  Negative for depression.        Objective:     Exam: /68 (BP Location: Right arm, Patient Position: Sitting, BP Cuff Size: Adult)   Pulse 76   Temp 36.3 °C (97.3 °F) (Temporal)   Ht 1.905 m (6' 3\")   Wt 103 kg (227 lb 1.2 oz)   SpO2 97%  Body mass index is 28.38 kg/m².    Physical Exam  Constitutional:       Comments: Overweight   HENT:      Head: Normocephalic and atraumatic.      Nose: Nose normal.   Cardiovascular:      Rate and Rhythm: Normal rate and regular rhythm.      Pulses: Normal pulses.      Heart sounds: Normal heart sounds. No murmur heard.  Pulmonary:      Effort: Pulmonary effort is normal. No respiratory distress.      Breath sounds: Normal breath sounds.   Neurological:      General: No focal deficit present.      Mental Status: He is alert.     Labs: Discussed results of A1c, CMP, PSA from 10/18/2022    Assessment & Plan:   72 y.o. male with the following -    Problem List Items Addressed This Visit       Controlled type 2 diabetes mellitus, without long-term current use of insulin (HCC) (Chronic)     This is a " chronic problem, controlled, continue metformin 500 mg daily.  Repeat A1c in 6 months.          Relevant Medications    metFORMIN (GLUCOPHAGE) 500 MG Tab    Other Relevant Orders    MICROALBUMIN CREAT RATIO URINE    HEMOGLOBIN A1C    Dyslipidemia (Chronic)     Controlled, continue Lipitor 40 mg daily.         Essential hypertension (Chronic)     Low-sodium diet, healthful diet measures.  Continue current regimen amlodipine 2.5, hydrochlorothiazide 25 mg and lisinopril 40 mg tablet daily.         Relevant Medications    amLODIPine (NORVASC) 2.5 MG Tab    atorvastatin (LIPITOR) 40 MG Tab    hydroCHLOROthiazide (HYDRODIURIL) 25 MG Tab    lisinopril (PRINIVIL) 40 MG tablet    Other Relevant Orders    Lipid Profile    CBC WITH DIFFERENTIAL    TSH WITH REFLEX TO FT4    Former smoker     obtain AAA screening.         Relevant Orders    US-ABDOMINAL AORTA W/O DOPPLER     Other Visit Diagnoses       HTN (hypertension), benign  (Chronic)       Relevant Medications    amLODIPine (NORVASC) 2.5 MG Tab    atorvastatin (LIPITOR) 40 MG Tab    hydroCHLOROthiazide (HYDRODIURIL) 25 MG Tab    lisinopril (PRINIVIL) 40 MG tablet    Colon cancer screening        Relevant Orders    COLOGUARD (FIT DNA)          Return in about 6 months (around 4/24/2023), or if symptoms worsen or fail to improve, for 6 mo follow up, follow up on DM.    Please note that this dictation was created using voice recognition software. I have made every reasonable attempt to correct obvious errors, but I expect that there are errors of grammar and possibly content that I did not discover before finalizing the note.

## 2022-10-25 ENCOUNTER — PHARMACY VISIT (OUTPATIENT)
Dept: PHARMACY | Facility: MEDICAL CENTER | Age: 72
End: 2022-10-25
Payer: MEDICARE

## 2022-11-28 ENCOUNTER — PATIENT MESSAGE (OUTPATIENT)
Dept: MEDICAL GROUP | Facility: MEDICAL CENTER | Age: 72
End: 2022-11-28
Payer: MEDICARE

## 2022-11-28 DIAGNOSIS — R19.5 POSITIVE COLORECTAL CANCER SCREENING USING COLOGUARD TEST: ICD-10-CM

## 2022-12-01 ENCOUNTER — HOSPITAL ENCOUNTER (OUTPATIENT)
Dept: RADIOLOGY | Facility: MEDICAL CENTER | Age: 72
End: 2022-12-01
Attending: INTERNAL MEDICINE
Payer: MEDICARE

## 2022-12-01 DIAGNOSIS — Z87.891 FORMER SMOKER: ICD-10-CM

## 2022-12-01 PROCEDURE — 76775 US EXAM ABDO BACK WALL LIM: CPT

## 2023-01-19 PROCEDURE — RXMED WILLOW AMBULATORY MEDICATION CHARGE: Performed by: NURSE PRACTITIONER

## 2023-01-20 ENCOUNTER — PHARMACY VISIT (OUTPATIENT)
Dept: PHARMACY | Facility: MEDICAL CENTER | Age: 73
End: 2023-01-20
Payer: COMMERCIAL

## 2023-01-27 PROCEDURE — RXMED WILLOW AMBULATORY MEDICATION CHARGE: Performed by: INTERNAL MEDICINE

## 2023-02-08 ENCOUNTER — PHARMACY VISIT (OUTPATIENT)
Dept: PHARMACY | Facility: MEDICAL CENTER | Age: 73
End: 2023-02-08
Payer: COMMERCIAL

## 2023-02-08 PROCEDURE — RXMED WILLOW AMBULATORY MEDICATION CHARGE: Performed by: NURSE PRACTITIONER

## 2023-02-08 PROCEDURE — RXMED WILLOW AMBULATORY MEDICATION CHARGE: Performed by: INTERNAL MEDICINE

## 2023-02-08 RX ORDER — POLYETHYLENE GLYCOL 3350, SODIUM SULFATE ANHYDROUS, SODIUM BICARBONATE, SODIUM CHLORIDE, POTASSIUM CHLORIDE 236; 22.74; 6.74; 5.86; 2.97 G/4L; G/4L; G/4L; G/4L; G/4L
POWDER, FOR SOLUTION ORAL
Qty: 4000 ML | Refills: 0 | Status: SHIPPED | OUTPATIENT
Start: 2023-02-08 | End: 2023-04-25

## 2023-02-08 RX ORDER — BISACODYL 5 MG/1
TABLET, DELAYED RELEASE ORAL
Qty: 2 TABLET | Refills: 0 | Status: SHIPPED | OUTPATIENT
Start: 2023-02-08 | End: 2023-04-25

## 2023-02-10 ENCOUNTER — PHARMACY VISIT (OUTPATIENT)
Dept: PHARMACY | Facility: MEDICAL CENTER | Age: 73
End: 2023-02-10
Payer: MEDICARE

## 2023-02-10 PROCEDURE — RXMED WILLOW AMBULATORY MEDICATION CHARGE: Performed by: INTERNAL MEDICINE

## 2023-02-10 RX ORDER — HYDROCHLOROTHIAZIDE 50 MG/1
25 TABLET ORAL DAILY
Qty: 50 TABLET | Refills: 5 | Status: SHIPPED | OUTPATIENT
Start: 2022-10-24 | End: 2023-10-25

## 2023-04-19 ENCOUNTER — HOSPITAL ENCOUNTER (OUTPATIENT)
Dept: LAB | Facility: MEDICAL CENTER | Age: 73
End: 2023-04-19
Attending: INTERNAL MEDICINE
Payer: MEDICARE

## 2023-04-19 DIAGNOSIS — E11.69 CONTROLLED TYPE 2 DIABETES MELLITUS WITH OTHER SPECIFIED COMPLICATION, WITHOUT LONG-TERM CURRENT USE OF INSULIN (HCC): ICD-10-CM

## 2023-04-19 DIAGNOSIS — I10 ESSENTIAL HYPERTENSION: ICD-10-CM

## 2023-04-19 LAB
BASOPHILS # BLD AUTO: 0.8 % (ref 0–1.8)
BASOPHILS # BLD: 0.04 K/UL (ref 0–0.12)
CHOLEST SERPL-MCNC: 97 MG/DL (ref 100–199)
EOSINOPHIL # BLD AUTO: 0.09 K/UL (ref 0–0.51)
EOSINOPHIL NFR BLD: 1.7 % (ref 0–6.9)
ERYTHROCYTE [DISTWIDTH] IN BLOOD BY AUTOMATED COUNT: 47 FL (ref 35.9–50)
EST. AVERAGE GLUCOSE BLD GHB EST-MCNC: 143 MG/DL
FASTING STATUS PATIENT QL REPORTED: NORMAL
HBA1C MFR BLD: 6.6 % (ref 4–5.6)
HCT VFR BLD AUTO: 45.7 % (ref 42–52)
HDLC SERPL-MCNC: 30 MG/DL
HGB BLD-MCNC: 15 G/DL (ref 14–18)
IMM GRANULOCYTES # BLD AUTO: 0.01 K/UL (ref 0–0.11)
IMM GRANULOCYTES NFR BLD AUTO: 0.2 % (ref 0–0.9)
LDLC SERPL CALC-MCNC: 55 MG/DL
LYMPHOCYTES # BLD AUTO: 1.65 K/UL (ref 1–4.8)
LYMPHOCYTES NFR BLD: 31.1 % (ref 22–41)
MCH RBC QN AUTO: 30 PG (ref 27–33)
MCHC RBC AUTO-ENTMCNC: 32.8 G/DL (ref 33.7–35.3)
MCV RBC AUTO: 91.4 FL (ref 81.4–97.8)
MONOCYTES # BLD AUTO: 0.62 K/UL (ref 0–0.85)
MONOCYTES NFR BLD AUTO: 11.7 % (ref 0–13.4)
NEUTROPHILS # BLD AUTO: 2.9 K/UL (ref 1.82–7.42)
NEUTROPHILS NFR BLD: 54.5 % (ref 44–72)
NRBC # BLD AUTO: 0 K/UL
NRBC BLD-RTO: 0 /100 WBC
PLATELET # BLD AUTO: 275 K/UL (ref 164–446)
PMV BLD AUTO: 10.1 FL (ref 9–12.9)
RBC # BLD AUTO: 5 M/UL (ref 4.7–6.1)
TRIGL SERPL-MCNC: 62 MG/DL (ref 0–149)
TSH SERPL DL<=0.005 MIU/L-ACNC: 3.78 UIU/ML (ref 0.38–5.33)
WBC # BLD AUTO: 5.3 K/UL (ref 4.8–10.8)

## 2023-04-19 PROCEDURE — 80061 LIPID PANEL: CPT

## 2023-04-19 PROCEDURE — 83036 HEMOGLOBIN GLYCOSYLATED A1C: CPT

## 2023-04-19 PROCEDURE — 85025 COMPLETE CBC W/AUTO DIFF WBC: CPT

## 2023-04-19 PROCEDURE — 84443 ASSAY THYROID STIM HORMONE: CPT

## 2023-04-19 PROCEDURE — 36415 COLL VENOUS BLD VENIPUNCTURE: CPT

## 2023-04-25 ENCOUNTER — OFFICE VISIT (OUTPATIENT)
Dept: MEDICAL GROUP | Facility: MEDICAL CENTER | Age: 73
End: 2023-04-25
Payer: MEDICARE

## 2023-04-25 VITALS
SYSTOLIC BLOOD PRESSURE: 122 MMHG | DIASTOLIC BLOOD PRESSURE: 72 MMHG | HEIGHT: 75 IN | WEIGHT: 235.23 LBS | OXYGEN SATURATION: 92 % | HEART RATE: 95 BPM | TEMPERATURE: 97.5 F | BODY MASS INDEX: 29.25 KG/M2

## 2023-04-25 DIAGNOSIS — Z23 NEED FOR VACCINATION: ICD-10-CM

## 2023-04-25 DIAGNOSIS — R09.89 DECREASED PULSES IN FEET: ICD-10-CM

## 2023-04-25 DIAGNOSIS — E11.69 CONTROLLED TYPE 2 DIABETES MELLITUS WITH OTHER SPECIFIED COMPLICATION, WITHOUT LONG-TERM CURRENT USE OF INSULIN (HCC): Chronic | ICD-10-CM

## 2023-04-25 DIAGNOSIS — I70.0 ATHEROSCLEROSIS OF ABDOMINAL AORTA (HCC): ICD-10-CM

## 2023-04-25 PROCEDURE — 99214 OFFICE O/P EST MOD 30 MIN: CPT | Mod: 25 | Performed by: INTERNAL MEDICINE

## 2023-04-25 PROCEDURE — 90471 IMMUNIZATION ADMIN: CPT | Performed by: INTERNAL MEDICINE

## 2023-04-25 PROCEDURE — 90715 TDAP VACCINE 7 YRS/> IM: CPT | Performed by: INTERNAL MEDICINE

## 2023-04-25 ASSESSMENT — FIBROSIS 4 INDEX: FIB4 SCORE: 1.11

## 2023-04-25 ASSESSMENT — ENCOUNTER SYMPTOMS
DEPRESSION: 0
FEVER: 0
SHORTNESS OF BREATH: 0
HEARTBURN: 0
CHILLS: 0
BLURRED VISION: 0

## 2023-04-25 ASSESSMENT — PATIENT HEALTH QUESTIONNAIRE - PHQ9: CLINICAL INTERPRETATION OF PHQ2 SCORE: 0

## 2023-04-25 NOTE — PROGRESS NOTES
Subjective:     HPI: Matthew is a pleasant 73 y.o. male who presents today  to follow-up on chronic problems listed below:    Problem   Need for Vaccination    Due for Tdap. Counseled patient, that not all vaccinations may be covered by his insurance and he may need to pay out-of-pocket for this vaccination.  Patient verbalizes understanding and agrees.       Atherosclerosis of Abdominal Aorta (Hcc)    Presume from previous PCP note.  Continue risk reduction  Statin and ASA        Controlled Type 2 Diabetes Mellitus, Without Long-Term Current Use of Insulin (Hcc)    This is a chronic problem, well-controlled on metformin 500 mg twice daily.  A1c has slightly increased as patient has gained some weight and has been exercising less over the winter.    Lab Results   Component Value Date/Time    HBA1C 6.6 (H) 04/19/2023 09:02 AM        Lab Results   Component Value Date/Time    HBA1C 6.2 (H) 10/18/2022 09:17 AM      Last Microalb/Cr ratio:   Lab Results   Component Value Date/Time    URCREAT 34.52 10/24/2022 1105    MICROALBUR <1.2 10/24/2022 1105    MALBCRT see below 10/24/2022 1105     Last diabetic foot exam: April 2023, decreased bilateral pulses  Last retinal eye exam: cataracts following with optometry, August 2022  ACEi/ARB: Lisinopril 40 mg daily  Statin: Atorvastatin 40 mg daily  LDL: At goal  Aspirin: 81 mg daily  Concomitant HTN:  controlled              Past Medical History:   Diagnosis Date    CAD (coronary artery disease) 3/22/2010    CATARACT 3/28/2012    Cataract, diabetic (HCC) 4/2/2015    Dyslipidemia 3/22/2010    High cholesterol     HTN (hypertension), benign 3/22/2010    Hypertension     MEDICAL HOME     Type II or unspecified type diabetes mellitus without mention of complication, not stated as uncontrolled     oral meds     Current Outpatient Medications Ordered in Epic   Medication Sig Dispense Refill    hydroCHLOROthiazide (HYDRODIURIL) 50 MG Tab Take 0.5 (one-half) tablet by mouth every day. 50  "Tablet 5    amLODIPine (NORVASC) 2.5 MG Tab Take 1 Tablet by mouth every evening. 100 Tablet 3    atorvastatin (LIPITOR) 40 MG Tab Take 1 Tablet by mouth every evening. 100 Tablet 3    hydroCHLOROthiazide (HYDRODIURIL) 25 MG Tab Take 1 tablet by mouth every day. 100 Tablet 3    lisinopril (PRINIVIL) 40 MG tablet Take 1 Tablet by mouth every day. 100 Tablet 3    metFORMIN (GLUCOPHAGE) 500 MG Tab Take 1 Tablet by mouth 2 times a day with meals. 200 Tablet 3    aspirin (ASA) 81 MG Chew Tab chewable tablet Chew 81 mg every day.      Coenzyme Q10 (CO Q 10 PO) Take  by mouth 2 Times a Day.      therapeutic multivitamin-minerals (THERAGRAN-M) TABS Take 1 Tab by mouth every morning.       No current [facility]-ordered facility-administered medications on file.     Health Maintenance: Completed    Review of Systems   Constitutional:  Negative for chills and fever.   Eyes:  Negative for blurred vision.   Respiratory:  Negative for shortness of breath.    Cardiovascular:  Negative for chest pain.   Gastrointestinal:  Negative for heartburn.   Psychiatric/Behavioral:  Negative for depression.      Objective:     Exam:  /72 (BP Location: Left arm, Patient Position: Sitting, BP Cuff Size: Adult)   Pulse 95   Temp 36.4 °C (97.5 °F) (Temporal)   Ht 1.905 m (6' 3\")   Wt 107 kg (235 lb 3.7 oz)   SpO2 92%   BMI 29.40 kg/m²  Body mass index is 29.4 kg/m².    Physical Exam  Constitutional:       Comments: Overweight   HENT:      Head: Normocephalic and atraumatic.      Nose: Nose normal.   Cardiovascular:      Rate and Rhythm: Normal rate and regular rhythm.      Pulses: Normal pulses.      Heart sounds: Normal heart sounds. No murmur heard.  Pulmonary:      Effort: Pulmonary effort is normal. No respiratory distress.      Breath sounds: Normal breath sounds.   Neurological:      General: No focal deficit present.      Mental Status: He is alert.     Monofilament testing with a 10 gram force: sensation intact: intact " bilaterally  Visual Inspection: Feet without maceration, ulcers, fissures.  Pedal pulses: decreased bilaterally    Labs: Reviewed results of CBC, TSH, A1c lipid panel from 4/19/2023    Assessment & Plan:   Matthew  is a pleasant 73 y.o. male with the following -     Problem List Items Addressed This Visit       Atherosclerosis of abdominal aorta (HCC) (Chronic)     Continue aspirin and statin, asymptomatic.         Controlled type 2 diabetes mellitus, without long-term current use of insulin (HCC) (Chronic)     Chronic problem, controlled, continue metformin 500 mg twice daily.  Patient will work on losing weight, exercising regularly and low carbohydrate diet.   -retina exam was normal limits from August 2022, no diabetes retinopathy   - Hypertension controlled, hyperlipidemia controlled   - Repeat A1c in 6 months         Relevant Orders    HEMOGLOBIN A1C    Diabetic Monofilament LE Exam (Completed)    Need for vaccination    Relevant Orders    Tdap =>8yo IM (Completed)     Other Visit Diagnoses       Decreased pulses in feet        Relevant Orders    US-EXTREMITY ARTERY LOWER BILAT W/YAS (COMBO)          Return in about 6 months (around 10/25/2023) for 6 mo follow up.    Please note that this dictation was created using voice recognition software. I have made every reasonable attempt to correct obvious errors, but I expect that there are errors of grammar and possibly content that I did not discover before finalizing the note.

## 2023-04-25 NOTE — ASSESSMENT & PLAN NOTE
Chronic problem, controlled, continue metformin 500 mg twice daily.  Patient will work on losing weight, exercising regularly and low carbohydrate diet.   -retina exam was normal limits from August 2022, no diabetes retinopathy   - Hypertension controlled, hyperlipidemia controlled   - Repeat A1c in 6 months

## 2023-05-01 ENCOUNTER — PHARMACY VISIT (OUTPATIENT)
Dept: PHARMACY | Facility: MEDICAL CENTER | Age: 73
End: 2023-05-01
Payer: MEDICARE

## 2023-05-01 PROCEDURE — RXMED WILLOW AMBULATORY MEDICATION CHARGE: Performed by: DENTIST

## 2023-05-01 RX ORDER — AMOXICILLIN 500 MG/1
500 CAPSULE ORAL 3 TIMES DAILY
Qty: 30 CAPSULE | Refills: 0 | Status: SHIPPED | OUTPATIENT
Start: 2023-05-01 | End: 2023-10-25

## 2023-05-12 PROCEDURE — RXMED WILLOW AMBULATORY MEDICATION CHARGE: Performed by: INTERNAL MEDICINE

## 2023-05-15 ENCOUNTER — PHARMACY VISIT (OUTPATIENT)
Dept: PHARMACY | Facility: MEDICAL CENTER | Age: 73
End: 2023-05-15
Payer: MEDICARE

## 2023-05-25 ENCOUNTER — TELEPHONE (OUTPATIENT)
Dept: HEALTH INFORMATION MANAGEMENT | Facility: OTHER | Age: 73
End: 2023-05-25

## 2023-05-25 ENCOUNTER — PHARMACY VISIT (OUTPATIENT)
Dept: PHARMACY | Facility: MEDICAL CENTER | Age: 73
End: 2023-05-25
Payer: MEDICARE

## 2023-05-25 PROCEDURE — RXMED WILLOW AMBULATORY MEDICATION CHARGE: Performed by: INTERNAL MEDICINE

## 2023-06-30 ENCOUNTER — HOSPITAL ENCOUNTER (OUTPATIENT)
Dept: RADIOLOGY | Facility: MEDICAL CENTER | Age: 73
End: 2023-06-30
Attending: INTERNAL MEDICINE
Payer: MEDICARE

## 2023-06-30 DIAGNOSIS — R09.89 DECREASED PULSES IN FEET: ICD-10-CM

## 2023-06-30 PROCEDURE — 93922 UPR/L XTREMITY ART 2 LEVELS: CPT

## 2023-06-30 PROCEDURE — 93922 UPR/L XTREMITY ART 2 LEVELS: CPT | Mod: 26 | Performed by: INTERNAL MEDICINE

## 2023-07-25 PROCEDURE — RXMED WILLOW AMBULATORY MEDICATION CHARGE: Performed by: INTERNAL MEDICINE

## 2023-07-26 ENCOUNTER — PHARMACY VISIT (OUTPATIENT)
Dept: PHARMACY | Facility: MEDICAL CENTER | Age: 73
End: 2023-07-26
Payer: MEDICARE

## 2023-07-26 PROCEDURE — RXMED WILLOW AMBULATORY MEDICATION CHARGE: Performed by: INTERNAL MEDICINE

## 2023-07-27 ENCOUNTER — PHARMACY VISIT (OUTPATIENT)
Dept: PHARMACY | Facility: MEDICAL CENTER | Age: 73
End: 2023-07-27
Payer: MEDICARE

## 2023-08-29 ENCOUNTER — PHARMACY VISIT (OUTPATIENT)
Dept: PHARMACY | Facility: MEDICAL CENTER | Age: 73
End: 2023-08-29
Payer: COMMERCIAL

## 2023-08-29 PROCEDURE — RXMED WILLOW AMBULATORY MEDICATION CHARGE: Performed by: DENTIST

## 2023-08-29 RX ORDER — AMOXICILLIN 500 MG/1
500 CAPSULE ORAL 3 TIMES DAILY
Qty: 30 CAPSULE | Refills: 0 | Status: SHIPPED | OUTPATIENT
Start: 2023-08-29 | End: 2023-10-25

## 2023-09-19 PROCEDURE — RXMED WILLOW AMBULATORY MEDICATION CHARGE: Performed by: INTERNAL MEDICINE

## 2023-09-20 ENCOUNTER — PHARMACY VISIT (OUTPATIENT)
Dept: PHARMACY | Facility: MEDICAL CENTER | Age: 73
End: 2023-09-20
Payer: COMMERCIAL

## 2023-10-17 ENCOUNTER — HOSPITAL ENCOUNTER (OUTPATIENT)
Dept: LAB | Facility: MEDICAL CENTER | Age: 73
End: 2023-10-17
Attending: INTERNAL MEDICINE
Payer: MEDICARE

## 2023-10-17 DIAGNOSIS — E11.69 CONTROLLED TYPE 2 DIABETES MELLITUS WITH OTHER SPECIFIED COMPLICATION, WITHOUT LONG-TERM CURRENT USE OF INSULIN (HCC): Chronic | ICD-10-CM

## 2023-10-17 LAB
EST. AVERAGE GLUCOSE BLD GHB EST-MCNC: 146 MG/DL
HBA1C MFR BLD: 6.7 % (ref 4–5.6)

## 2023-10-17 PROCEDURE — 83036 HEMOGLOBIN GLYCOSYLATED A1C: CPT

## 2023-10-17 PROCEDURE — 36415 COLL VENOUS BLD VENIPUNCTURE: CPT

## 2023-10-19 PROBLEM — E11.69 DM TYPE 2 WITH DIABETIC MIXED HYPERLIPIDEMIA (HCC): Status: ACTIVE | Noted: 2023-10-19

## 2023-10-19 PROBLEM — E78.2 DM TYPE 2 WITH DIABETIC MIXED HYPERLIPIDEMIA (HCC): Status: ACTIVE | Noted: 2023-10-19

## 2023-10-24 DIAGNOSIS — E11.69 CONTROLLED TYPE 2 DIABETES MELLITUS WITH OTHER SPECIFIED COMPLICATION, WITHOUT LONG-TERM CURRENT USE OF INSULIN (HCC): ICD-10-CM

## 2023-10-25 ENCOUNTER — PHARMACY VISIT (OUTPATIENT)
Dept: PHARMACY | Facility: MEDICAL CENTER | Age: 73
End: 2023-10-25
Payer: COMMERCIAL

## 2023-10-25 ENCOUNTER — OFFICE VISIT (OUTPATIENT)
Dept: MEDICAL GROUP | Facility: MEDICAL CENTER | Age: 73
End: 2023-10-25
Payer: MEDICARE

## 2023-10-25 ENCOUNTER — HOSPITAL ENCOUNTER (OUTPATIENT)
Facility: MEDICAL CENTER | Age: 73
End: 2023-10-25
Attending: INTERNAL MEDICINE
Payer: MEDICARE

## 2023-10-25 VITALS
OXYGEN SATURATION: 98 % | TEMPERATURE: 97.3 F | HEART RATE: 60 BPM | WEIGHT: 235 LBS | DIASTOLIC BLOOD PRESSURE: 64 MMHG | SYSTOLIC BLOOD PRESSURE: 118 MMHG | HEIGHT: 75 IN | BODY MASS INDEX: 29.22 KG/M2

## 2023-10-25 DIAGNOSIS — E11.69 CONTROLLED TYPE 2 DIABETES MELLITUS WITH OTHER SPECIFIED COMPLICATION, WITHOUT LONG-TERM CURRENT USE OF INSULIN (HCC): ICD-10-CM

## 2023-10-25 DIAGNOSIS — I10 ESSENTIAL HYPERTENSION: Chronic | ICD-10-CM

## 2023-10-25 DIAGNOSIS — I10 HTN (HYPERTENSION), BENIGN: Chronic | ICD-10-CM

## 2023-10-25 DIAGNOSIS — E78.5 DYSLIPIDEMIA: Chronic | ICD-10-CM

## 2023-10-25 LAB
CREAT UR-MCNC: 32.33 MG/DL
MICROALBUMIN UR-MCNC: <1.2 MG/DL
MICROALBUMIN/CREAT UR: NORMAL MG/G (ref 0–30)

## 2023-10-25 PROCEDURE — 3078F DIAST BP <80 MM HG: CPT | Performed by: INTERNAL MEDICINE

## 2023-10-25 PROCEDURE — 82043 UR ALBUMIN QUANTITATIVE: CPT

## 2023-10-25 PROCEDURE — 3074F SYST BP LT 130 MM HG: CPT | Performed by: INTERNAL MEDICINE

## 2023-10-25 PROCEDURE — RXMED WILLOW AMBULATORY MEDICATION CHARGE: Performed by: INTERNAL MEDICINE

## 2023-10-25 PROCEDURE — 99214 OFFICE O/P EST MOD 30 MIN: CPT | Performed by: INTERNAL MEDICINE

## 2023-10-25 PROCEDURE — 82570 ASSAY OF URINE CREATININE: CPT

## 2023-10-25 RX ORDER — LISINOPRIL 40 MG/1
40 TABLET ORAL DAILY
Qty: 100 TABLET | Refills: 3 | Status: SHIPPED | OUTPATIENT
Start: 2023-10-25

## 2023-10-25 RX ORDER — AMLODIPINE BESYLATE 2.5 MG/1
2.5 TABLET ORAL EVERY EVENING
Qty: 100 TABLET | Refills: 3 | Status: SHIPPED | OUTPATIENT
Start: 2023-10-25 | End: 2024-01-29 | Stop reason: SDUPTHER

## 2023-10-25 RX ORDER — HYDROCHLOROTHIAZIDE 25 MG/1
25 TABLET ORAL DAILY
Qty: 100 TABLET | Refills: 3 | Status: SHIPPED | OUTPATIENT
Start: 2023-10-25

## 2023-10-25 RX ORDER — ATORVASTATIN CALCIUM 40 MG/1
40 TABLET, FILM COATED ORAL EVERY EVENING
Qty: 100 TABLET | Refills: 3 | Status: SHIPPED | OUTPATIENT
Start: 2023-10-25

## 2023-10-25 ASSESSMENT — FIBROSIS 4 INDEX: FIB4 SCORE: 1.11

## 2023-10-25 NOTE — PROGRESS NOTES
Subjective:     CC: No chief complaint on file.    Diagnoses of Controlled type 2 diabetes mellitus with other specified complication, without long-term current use of insulin (HCC), Dyslipidemia, HTN (hypertension), benign, and Essential hypertension were pertinent to this visit.    HPI: Matthew is a pleasant 73 y.o. male who presents today for DM follow up     Problem   Dyslipidemia    Controlled on Lipitor 40 mg daily.  Lab Results   Component Value Date/Time    CHOLSTRLTOT 92 (L) 04/11/2022 09:35 AM    LDL 53 04/11/2022 09:35 AM    HDL 28 (A) 04/11/2022 09:35 AM    TRIGLYCERIDE 57 04/11/2022 09:35 AM            Controlled Type 2 Diabetes Mellitus, Without Long-Term Current Use of Insulin (Hcc)    This is a chronic problem, well-controlled on metformin 500 mg twice daily.  A1c has slightly increased as patient has gained some weight.     Lab Results   Component Value Date/Time    HBA1C 6.7 (H) 10/17/2023 08:54 AM        Lab Results   Component Value Date/Time    HBA1C 6.6 (H) 04/19/2023 09:02 AM      Lab Results   Component Value Date/Time    HBA1C 6.2 (H) 10/18/2022 09:17 AM      Last Microalb/Cr ratio:   Lab Results   Component Value Date/Time    URCREAT 34.52 10/24/2022 1105    MICROALBUR <1.2 10/24/2022 1105    MALBCRT see below 10/24/2022 1105     Last diabetic foot exam: April 2023, decreased bilateral pulses  Last retinal eye exam: cataracts following with optometry, August 2023, requested   ACEi/ARB: Lisinopril 40 mg daily  Statin: Atorvastatin 40 mg daily  LDL: At goal  Aspirin: 81 mg daily  Concomitant HTN:  controlled            Essential Hypertension    Chronic, controlled on amlodipine 2.5, hydrochlorothiazide 25 mg and lisinopril 40 mg tablet daily.  Low-sodium diet, healthful measures.       Past Medical History:   Diagnosis Date    CAD (coronary artery disease) 3/22/2010    CATARACT 3/28/2012    Cataract, diabetic (HCC) 4/2/2015    Dyslipidemia 3/22/2010    High cholesterol     HTN (hypertension),  "benign 3/22/2010    Hypertension     MEDICAL HOME     Type II or unspecified type diabetes mellitus without mention of complication, not stated as uncontrolled     oral meds     Current Outpatient Medications Ordered in Epic   Medication Sig Dispense Refill    atorvastatin (LIPITOR) 40 MG Tab Take 1 Tablet by mouth every evening. 100 Tablet 3    hydroCHLOROthiazide 25 MG Tab Take 1 tablet by mouth every day. 100 Tablet 3    lisinopril (PRINIVIL) 40 MG tablet Take 1 Tablet by mouth every day. 100 Tablet 3    amLODIPine (NORVASC) 2.5 MG Tab Take 1 Tablet by mouth every evening. 100 Tablet 3    metFORMIN (GLUCOPHAGE) 500 MG Tab Take 1 Tablet by mouth 2 times a day with meals. 200 Tablet 3    aspirin (ASA) 81 MG Chew Tab chewable tablet Chew 81 mg every day.      Coenzyme Q10 (CO Q 10 PO) Take  by mouth 2 Times a Day.      therapeutic multivitamin-minerals (THERAGRAN-M) TABS Take 1 Tab by mouth every morning.       No current [facility]-ordered facility-administered medications on file.     Review of Systems   All other systems reviewed and are negative.    Objective:     Exam:  /64 (BP Location: Left arm, Patient Position: Sitting, BP Cuff Size: Adult)   Pulse 60   Temp 36.3 °C (97.3 °F) (Temporal)   Ht 1.905 m (6' 3\")   Wt 107 kg (235 lb)   SpO2 98%   BMI 29.37 kg/m²  Body mass index is 29.37 kg/m².    Physical Exam  Constitutional:       Appearance: Normal appearance.   Cardiovascular:      Rate and Rhythm: Normal rate and regular rhythm.      Pulses: Normal pulses.      Heart sounds: Normal heart sounds. No murmur heard.  Pulmonary:      Effort: Pulmonary effort is normal.      Breath sounds: Normal breath sounds.   Neurological:      General: No focal deficit present.      Mental Status: He is alert and oriented to person, place, and time.       Labs: as above     Assessment & Plan:   Matthew  is a pleasant 73 y.o. male with the following -     Problem List Items Addressed This Visit       Controlled type 2 " diabetes mellitus, without long-term current use of insulin (HCC) (Chronic)    Relevant Orders    Lipid Profile    HEMOGLOBIN A1C    Comp Metabolic Panel    MICROALBUMIN CREAT RATIO URINE    Dyslipidemia (Chronic)     Chronic, stable, continue Lipitor 40 mg daily         Relevant Medications    atorvastatin (LIPITOR) 40 MG Tab    Essential hypertension (Chronic)     Stable, controlled on lisinopril 40, HCTZ 25 and amlodipine 2.5 mg         Relevant Medications    atorvastatin (LIPITOR) 40 MG Tab    hydroCHLOROthiazide 25 MG Tab    lisinopril (PRINIVIL) 40 MG tablet     Other Visit Diagnoses       HTN (hypertension), benign  (Chronic)       Relevant Medications    atorvastatin (LIPITOR) 40 MG Tab    hydroCHLOROthiazide 25 MG Tab    lisinopril (PRINIVIL) 40 MG tablet          Return in about 4 months (around 2/25/2024), or if symptoms worsen or fail to improve, for labs, DM, LIPIDS FV , annual wellness visit.    Please note that this dictation was created using voice recognition software. I have made every reasonable attempt to correct obvious errors, but I expect that there are errors of grammar and possibly content that I did not discover before finalizing the note.

## 2023-10-25 NOTE — LETTER
[facility]  Pooja Marte M.D.  [address]  [place] [address]  Fax: [phone]   Authorization for Release/Disclosure of   Protected Health Information   Name: MATTHEW ALFRED : 1950 SSN: [id number]   Address: [address] [place] [address]  [place] [address] Phone:    [phone] (home) [phone] (work)   I authorize the entity listed below to release/disclose the PHI below to:   [facility]/Pooja Marte M.D. and Pooja Marte M.D.   Provider or Entity Name:  [facility]    Address   City, State, Zip   Phone:      Fax:     Reason for request: continuity of care   Information to be released:    [  ] LAST COLONOSCOPY,  including any PATH REPORT and follow-up  [  ] LAST FIT/COLOGUARD RESULT [  ] LAST DEXA  [  ] LAST MAMMOGRAM  [  ] LAST PAP  [  ] LAST LABS [ x ] RETINA EXAM REPORT  [  ] IMMUNIZATION RECORDS  [  x] Release all info      [  ] Check here and initial the line next to each item to release ALL health information INCLUDING  _____ Care and treatment for drug and / or alcohol abuse  _____ HIV testing, infection status, or AIDS  _____ Genetic Testing    DATES OF SERVICE OR TIME PERIOD TO BE DISCLOSED: _____________  I understand and acknowledge that:  * This Authorization may be revoked at any time by you in writing, except if your health information has already been used or disclosed.  * Your health information that will be used or disclosed as a result of you signing this authorization could be re-disclosed by the recipient. If this occurs, your re-disclosed health information may no longer be protected by State or Federal laws.  * You may refuse to sign this Authorization. Your refusal will not affect your ability to obtain treatment.  * This Authorization becomes effective upon signing and will  on (date) __________.      If no date is indicated, this Authorization will  one (1) year from the signature date.    Name: Matthew  Tahir Perez  Signature: Date:   10/25/2023     PLEASE FAX REQUESTED RECORDS BACK TO: [phone]

## 2023-11-28 ENCOUNTER — PHARMACY VISIT (OUTPATIENT)
Dept: PHARMACY | Facility: MEDICAL CENTER | Age: 73
End: 2023-11-28
Payer: COMMERCIAL

## 2023-11-28 PROCEDURE — RXMED WILLOW AMBULATORY MEDICATION CHARGE: Performed by: ORAL & MAXILLOFACIAL SURGERY

## 2023-11-28 RX ORDER — CHLORHEXIDINE GLUCONATE ORAL RINSE 1.2 MG/ML
SOLUTION DENTAL
Qty: 473 ML | Refills: 0 | Status: SHIPPED | OUTPATIENT
Start: 2023-11-28 | End: 2024-02-27

## 2023-11-28 RX ORDER — AMOXICILLIN 500 MG/1
CAPSULE ORAL
Qty: 21 CAPSULE | Refills: 0 | Status: SHIPPED | OUTPATIENT
Start: 2023-11-28 | End: 2024-02-27

## 2024-01-19 PROCEDURE — RXMED WILLOW AMBULATORY MEDICATION CHARGE: Performed by: INTERNAL MEDICINE

## 2024-01-22 ENCOUNTER — PHARMACY VISIT (OUTPATIENT)
Dept: PHARMACY | Facility: MEDICAL CENTER | Age: 74
End: 2024-01-22
Payer: COMMERCIAL

## 2024-01-22 PROCEDURE — RXMED WILLOW AMBULATORY MEDICATION CHARGE: Performed by: INTERNAL MEDICINE

## 2024-01-28 NOTE — ASSESSMENT & PLAN NOTE
Stable on metformin  He has DLD on statin     Ref. Range 4/11/2022 09:35   Glycohemoglobin Latest Ref Range: 4.0 - 5.6 % 6.2 (H)   Estim. Avg Glu Latest Units: mg/dL 131      Ref. Range 10/14/2021 09:32   Glycohemoglobin Latest Ref Range: 4.0 - 5.6 % 6.5 (H)   Estim. Avg Glu Latest Units: mg/dL 140      Ref. Range 4/13/2021 09:47   Glycohemoglobin Latest Ref Range: 4.0 - 5.6 % 6.2 (H)   Estim. Avg Glu Latest Units: mg/dL 131      Ref. Range 7/16/2020 09:15   Glycohemoglobin Latest Ref Range: 0.0 - 5.6 % 6.3 (H)      cataracts following with optometry    10/19/21  Monofilament testing with a 10 gram force: sensation intact: intact bilaterally  Visual Inspection: Feet without maceration, ulcers, fissures.  Pedal pulses: normal       Yes

## 2024-01-29 DIAGNOSIS — E11.69 CONTROLLED TYPE 2 DIABETES MELLITUS WITH OTHER SPECIFIED COMPLICATION, WITHOUT LONG-TERM CURRENT USE OF INSULIN (HCC): ICD-10-CM

## 2024-01-29 RX ORDER — AMLODIPINE BESYLATE 2.5 MG/1
2.5 TABLET ORAL EVERY EVENING
Qty: 100 TABLET | Refills: 3 | Status: SHIPPED | OUTPATIENT
Start: 2024-01-29

## 2024-01-29 NOTE — TELEPHONE ENCOUNTER
Received request via: Pharmacy    Was the patient seen in the last year in this department? Yes    Does the patient have an active prescription (recently filled or refills available) for medication(s) requested? No    Pharmacy Name: Pharmacy:   [facility] - [place], [place] - [address]     Does the patient have care home Plus and need 100 day supply (blood pressure, diabetes and cholesterol meds only)? Yes, quantity updated to 100 days

## 2024-02-20 ENCOUNTER — HOSPITAL ENCOUNTER (OUTPATIENT)
Dept: LAB | Facility: MEDICAL CENTER | Age: 74
End: 2024-02-20
Attending: INTERNAL MEDICINE
Payer: MEDICARE

## 2024-02-20 DIAGNOSIS — E11.69 CONTROLLED TYPE 2 DIABETES MELLITUS WITH OTHER SPECIFIED COMPLICATION, WITHOUT LONG-TERM CURRENT USE OF INSULIN (HCC): ICD-10-CM

## 2024-02-20 LAB
ALBUMIN SERPL BCP-MCNC: 4.1 G/DL (ref 3.2–4.9)
ALBUMIN/GLOB SERPL: 1.4 G/DL
ALP SERPL-CCNC: 99 U/L (ref 30–99)
ALT SERPL-CCNC: 18 U/L (ref 2–50)
ANION GAP SERPL CALC-SCNC: 9 MMOL/L (ref 7–16)
AST SERPL-CCNC: 19 U/L (ref 12–45)
BILIRUB SERPL-MCNC: 0.5 MG/DL (ref 0.1–1.5)
BUN SERPL-MCNC: 13 MG/DL (ref 8–22)
CALCIUM ALBUM COR SERPL-MCNC: 9.6 MG/DL (ref 8.5–10.5)
CALCIUM SERPL-MCNC: 9.7 MG/DL (ref 8.4–10.2)
CHLORIDE SERPL-SCNC: 99 MMOL/L (ref 96–112)
CHOLEST SERPL-MCNC: 109 MG/DL (ref 100–199)
CO2 SERPL-SCNC: 29 MMOL/L (ref 20–33)
CREAT SERPL-MCNC: 0.89 MG/DL (ref 0.5–1.4)
EST. AVERAGE GLUCOSE BLD GHB EST-MCNC: 143 MG/DL
FASTING STATUS PATIENT QL REPORTED: NORMAL
GFR SERPLBLD CREATININE-BSD FMLA CKD-EPI: 89 ML/MIN/1.73 M 2
GLOBULIN SER CALC-MCNC: 3 G/DL (ref 1.9–3.5)
GLUCOSE SERPL-MCNC: 120 MG/DL (ref 65–99)
HBA1C MFR BLD: 6.6 % (ref 4–5.6)
HDLC SERPL-MCNC: 30 MG/DL
LDLC SERPL CALC-MCNC: 62 MG/DL
POTASSIUM SERPL-SCNC: 4.2 MMOL/L (ref 3.6–5.5)
PROT SERPL-MCNC: 7.1 G/DL (ref 6–8.2)
SODIUM SERPL-SCNC: 137 MMOL/L (ref 135–145)
TRIGL SERPL-MCNC: 85 MG/DL (ref 0–149)

## 2024-02-20 PROCEDURE — 83036 HEMOGLOBIN GLYCOSYLATED A1C: CPT

## 2024-02-20 PROCEDURE — 80053 COMPREHEN METABOLIC PANEL: CPT

## 2024-02-20 PROCEDURE — 80061 LIPID PANEL: CPT

## 2024-02-20 PROCEDURE — 36415 COLL VENOUS BLD VENIPUNCTURE: CPT

## 2024-02-24 SDOH — HEALTH STABILITY: PHYSICAL HEALTH: ON AVERAGE, HOW MANY MINUTES DO YOU ENGAGE IN EXERCISE AT THIS LEVEL?: 20 MIN

## 2024-02-24 SDOH — ECONOMIC STABILITY: INCOME INSECURITY: IN THE LAST 12 MONTHS, WAS THERE A TIME WHEN YOU WERE NOT ABLE TO PAY THE MORTGAGE OR RENT ON TIME?: NO

## 2024-02-24 SDOH — ECONOMIC STABILITY: FOOD INSECURITY: WITHIN THE PAST 12 MONTHS, YOU WORRIED THAT YOUR FOOD WOULD RUN OUT BEFORE YOU GOT MONEY TO BUY MORE.: NEVER TRUE

## 2024-02-24 SDOH — ECONOMIC STABILITY: FOOD INSECURITY: WITHIN THE PAST 12 MONTHS, THE FOOD YOU BOUGHT JUST DIDN'T LAST AND YOU DIDN'T HAVE MONEY TO GET MORE.: NEVER TRUE

## 2024-02-24 SDOH — ECONOMIC STABILITY: HOUSING INSECURITY: IN THE LAST 12 MONTHS, HOW MANY PLACES HAVE YOU LIVED?: 1

## 2024-02-24 SDOH — HEALTH STABILITY: PHYSICAL HEALTH: ON AVERAGE, HOW MANY DAYS PER WEEK DO YOU ENGAGE IN MODERATE TO STRENUOUS EXERCISE (LIKE A BRISK WALK)?: 6 DAYS

## 2024-02-24 SDOH — ECONOMIC STABILITY: INCOME INSECURITY: HOW HARD IS IT FOR YOU TO PAY FOR THE VERY BASICS LIKE FOOD, HOUSING, MEDICAL CARE, AND HEATING?: NOT HARD AT ALL

## 2024-02-24 ASSESSMENT — SOCIAL DETERMINANTS OF HEALTH (SDOH)
HOW OFTEN DO YOU GET TOGETHER WITH FRIENDS OR RELATIVES?: NEVER
IN A TYPICAL WEEK, HOW MANY TIMES DO YOU TALK ON THE PHONE WITH FAMILY, FRIENDS, OR NEIGHBORS?: NEVER
DO YOU BELONG TO ANY CLUBS OR ORGANIZATIONS SUCH AS CHURCH GROUPS UNIONS, FRATERNAL OR ATHLETIC GROUPS, OR SCHOOL GROUPS?: NO
HOW OFTEN DO YOU ATTEND CHURCH OR RELIGIOUS SERVICES?: NEVER
WITHIN THE PAST 12 MONTHS, YOU WORRIED THAT YOUR FOOD WOULD RUN OUT BEFORE YOU GOT THE MONEY TO BUY MORE: NEVER TRUE
HOW HARD IS IT FOR YOU TO PAY FOR THE VERY BASICS LIKE FOOD, HOUSING, MEDICAL CARE, AND HEATING?: NOT HARD AT ALL
HOW OFTEN DO YOU ATTEND CHURCH OR RELIGIOUS SERVICES?: NEVER
HOW OFTEN DO YOU ATTENT MEETINGS OF THE CLUB OR ORGANIZATION YOU BELONG TO?: NEVER
HOW OFTEN DO YOU HAVE SIX OR MORE DRINKS ON ONE OCCASION: NEVER
IN A TYPICAL WEEK, HOW MANY TIMES DO YOU TALK ON THE PHONE WITH FAMILY, FRIENDS, OR NEIGHBORS?: NEVER
HOW MANY DRINKS CONTAINING ALCOHOL DO YOU HAVE ON A TYPICAL DAY WHEN YOU ARE DRINKING: PATIENT DOES NOT DRINK
HOW OFTEN DO YOU GET TOGETHER WITH FRIENDS OR RELATIVES?: NEVER
HOW OFTEN DO YOU HAVE A DRINK CONTAINING ALCOHOL: NEVER
DO YOU BELONG TO ANY CLUBS OR ORGANIZATIONS SUCH AS CHURCH GROUPS UNIONS, FRATERNAL OR ATHLETIC GROUPS, OR SCHOOL GROUPS?: NO
HOW OFTEN DO YOU ATTENT MEETINGS OF THE CLUB OR ORGANIZATION YOU BELONG TO?: NEVER

## 2024-02-24 ASSESSMENT — LIFESTYLE VARIABLES
HOW MANY STANDARD DRINKS CONTAINING ALCOHOL DO YOU HAVE ON A TYPICAL DAY: PATIENT DOES NOT DRINK
HOW OFTEN DO YOU HAVE A DRINK CONTAINING ALCOHOL: NEVER
HOW OFTEN DO YOU HAVE SIX OR MORE DRINKS ON ONE OCCASION: NEVER
SKIP TO QUESTIONS 9-10: 1
AUDIT-C TOTAL SCORE: 0

## 2024-02-27 ENCOUNTER — OFFICE VISIT (OUTPATIENT)
Dept: MEDICAL GROUP | Facility: MEDICAL CENTER | Age: 74
End: 2024-02-27
Payer: MEDICARE

## 2024-02-27 VITALS
HEART RATE: 91 BPM | TEMPERATURE: 97.8 F | HEIGHT: 75 IN | DIASTOLIC BLOOD PRESSURE: 82 MMHG | SYSTOLIC BLOOD PRESSURE: 138 MMHG | BODY MASS INDEX: 29.06 KG/M2 | WEIGHT: 233.69 LBS | OXYGEN SATURATION: 97 %

## 2024-02-27 DIAGNOSIS — I70.0 ATHEROSCLEROSIS OF ABDOMINAL AORTA (HCC): Chronic | ICD-10-CM

## 2024-02-27 DIAGNOSIS — E11.69 CONTROLLED TYPE 2 DIABETES MELLITUS WITH OTHER SPECIFIED COMPLICATION, WITHOUT LONG-TERM CURRENT USE OF INSULIN (HCC): Chronic | ICD-10-CM

## 2024-02-27 DIAGNOSIS — Z00.00 MEDICARE ANNUAL WELLNESS VISIT, SUBSEQUENT: Primary | ICD-10-CM

## 2024-02-27 DIAGNOSIS — Z12.5 PROSTATE CANCER SCREENING: ICD-10-CM

## 2024-02-27 PROCEDURE — G0439 PPPS, SUBSEQ VISIT: HCPCS | Performed by: INTERNAL MEDICINE

## 2024-02-27 PROCEDURE — 3079F DIAST BP 80-89 MM HG: CPT | Performed by: INTERNAL MEDICINE

## 2024-02-27 PROCEDURE — 3075F SYST BP GE 130 - 139MM HG: CPT | Performed by: INTERNAL MEDICINE

## 2024-02-27 ASSESSMENT — ACTIVITIES OF DAILY LIVING (ADL): BATHING_REQUIRES_ASSISTANCE: 0

## 2024-02-27 ASSESSMENT — PATIENT HEALTH QUESTIONNAIRE - PHQ9: CLINICAL INTERPRETATION OF PHQ2 SCORE: 0

## 2024-02-27 ASSESSMENT — FIBROSIS 4 INDEX: FIB4 SCORE: 1.221366258413127542

## 2024-02-27 ASSESSMENT — ENCOUNTER SYMPTOMS: GENERAL WELL-BEING: EXCELLENT

## 2024-02-27 NOTE — PROGRESS NOTES
Chief Complaint   Patient presents with    Annual Wellness Visit     HPI: Matthew Perez is a 74 y.o. here for Medicare Annual Wellness Visit and diabetes follow-up.   Problem   Medicare Annual Wellness Visit, Subsequent   Atherosclerosis of Abdominal Aorta (Hcc)    Asymptomatic,  Continue risk reduction  Statin and ASA        Controlled Type 2 Diabetes Mellitus, Without Long-Term Current Use of Insulin (Hcc)    This is a chronic problem, well-controlled on metformin 500 mg twice daily.  patient has gained some weight during the holidays   Lab Results   Component Value Date/Time    HBA1C 6.6 (H) 02/20/2024 08:56 AM      Last Microalb/Cr ratio:   Lab Results   Component Value Date/Time    URCREAT 32.33 10/25/2023 1132    MICROALBUR <1.2 10/25/2023 1132    MALBCRT see below 10/25/2023 1132   Last diabetic foot exam: April 2023, decreased bilateral pulses  Last retinal eye exam: cataracts following with optometry, August 2023, requested   ACEi/ARB: Lisinopril 40 mg daily  Statin: Atorvastatin 40 mg daily  LDL: At goal  Aspirin: 81 mg daily  Concomitant HTN:  controlled                Patient Active Problem List    Diagnosis Date Noted    Medicare annual wellness visit, subsequent 02/27/2024    DM type 2 with diabetic mixed hyperlipidemia (HCC) 10/19/2023    Need for vaccination 04/25/2023    Former smoker 10/24/2022    Vitamin D insufficiency 04/20/2021    Cubital tunnel syndrome on right 09/11/2019    Atherosclerosis of abdominal aorta (HCC) 04/04/2019    Dyslipidemia 10/24/2017    Controlled type 2 diabetes mellitus, without long-term current use of insulin (HCC) 04/06/2017    Cataract of both eyes 04/02/2015    Primary localized osteoarthrosis, pelvic region and thigh 10/21/2014    Essential hypertension 03/22/2010    CAD (coronary artery disease) 03/22/2010       Current Outpatient Medications   Medication Sig Dispense Refill    amLODIPine (NORVASC) 2.5 MG Tab Take 1 Tablet by mouth every evening. 100  Tablet 3    metFORMIN (GLUCOPHAGE) 500 MG Tab Take 1 Tablet by mouth 2 times a day with meals. 200 Tablet 3    atorvastatin (LIPITOR) 40 MG Tab Take 1 Tablet by mouth every evening. 100 Tablet 3    hydroCHLOROthiazide 25 MG Tab Take 1 tablet by mouth every day. 100 Tablet 3    lisinopril (PRINIVIL) 40 MG tablet Take 1 Tablet by mouth every day. 100 Tablet 3    aspirin (ASA) 81 MG Chew Tab chewable tablet Chew 81 mg every day.      Coenzyme Q10 (CO Q 10 PO) Take  by mouth 2 Times a Day.      therapeutic multivitamin-minerals (THERAGRAN-M) TABS Take 1 Tab by mouth every morning.       No current facility-administered medications for this visit.          Current supplements as per medication list.     Allergies: Seasonal    Current social contact/activities: spends time with his wife, travelling     He  reports that he quit smoking about 45 years ago. His smoking use included cigarettes. He started smoking about 57 years ago. He has a 12 pack-year smoking history. He has never used smokeless tobacco. He reports that he does not currently use alcohol after a past usage of about 0.6 oz of alcohol per week. He reports that he does not use drugs.  Counseling given: Not Answered      ROS:    Gait: Uses no assistive device  Ostomy: No  Other tubes: No  Amputations: No  Chronic oxygen use: No  Last eye exam: 5/2023  Wears hearing aids: No   : Denies any urinary leakage during the last 6 months    Screening:    Depression Screening  Little interest or pleasure in doing things?  0 - not at all  Feeling down, depressed , or hopeless? 0 - not at all  Patient Health Questionnaire Score: 0     If depressive symptoms identified deferred to follow up visit unless specifically addressed in assessment and plan.    Interpretation of PHQ-9 Total Score   Score Severity   1-4 No Depression   5-9 Mild Depression   10-14 Moderate Depression   15-19 Moderately Severe Depression   20-27 Severe Depression    Screening for Cognitive  Impairment  Do you or any of your friends or family members have any concern about your memory?    Three Minute Recall (Banana, Sunrise, Chair) 2/3    Jeffrey clock face with all 12 numbers and set the hands to show 20 past 8.  Yes    Cognitive concerns identified deferred for follow up unless specifically addressed in assessment and plan.    Fall Risk Assessment  Has the patient had two or more falls in the last year or any fall with injury in the last year?  No    Safety Assessment  Do you always wear your seatbelt?  Yes  Any changes to home needed to function safely? No  Difficulty hearing.  No  Patient counseled about all safety risks that were identified.    Functional Assessment ADLs  Are there any barriers preventing you from cooking for yourself or meeting nutritional needs?  No.    Are there any barriers preventing you from driving safely or obtaining transportation?  No.    Are there any barriers preventing you from using a telephone or calling for help?  No    Are there any barriers preventing you from shopping?  No.    Are there any barriers preventing you from taking care of your own finances?  No    Are there any barriers preventing you from managing your medications?  No    Are there any barriers preventing you from showering, bathing or dressing yourself? No    Are there any barriers preventing you from doing housework or laundry? No  Are there any barriers preventing you from using the toilet?No  Are you currently engaging in any exercise or physical activity?  Yes.      Self-Assessment of Health  What is your perception of your health? Excellent  Do you sleep more than six hours a night? Yes  In the past 7 days, how much did pain keep you from doing your normal work? None  Do you spend quality time with family or friends (virtually or in person)? Yes  Do you usually eat a heart healthy diet that constists of a variety of fruits, vegetables, whole grains and fiber? Yes  Do you eat foods high in fat  and/or Fast Food more than three times per week? No    Advance Care Planning  Do you have an Advance Directive, Living Will, Durable Power of , or POLST? Yes  Advance Directive Living Will Durable Power of  POLST is on file    Health Maintenance Summary            Overdue - Diabetes: Retinopathy Screening (Yearly) Overdue since 8/10/2023      08/10/2022  REFERRAL FOR RETINAL SCREENING EXAM    07/19/2021  Done - no diabetic retinopathy    09/17/2019  REFERRAL FOR RETINAL SCREENING EXAM    07/12/2018  REFERRAL FOR RETINAL SCREENING EXAM    05/30/2017  REFERRAL FOR RETINAL SCREENING EXAM    Only the first 5 history entries have been loaded, but more history exists.              Overdue - COVID-19 Vaccine (5 - 2023-24 season) Overdue since 12/11/2023      10/16/2023  Imm Admin: MODERNA BIVALENT BOOSTER SARS-COV-2 VACCINE (PED 6M-5Y)    10/06/2022  Imm Admin: MODERNA BIVALENT BOOSTER SARS-COV-2 VACCINE (6+)    05/06/2022  Imm Admin: MODERNA SARS-COV-2 VACCINE (12+)    11/15/2021  Imm Admin: MODERNA SARS-COV-2 VACCINE (12+)    02/17/2021  Imm Admin: MODERNA SARS-COV-2 VACCINE (12+)    Only the first 5 history entries have been loaded, but more history exists.              Diabetes: Monofilament / LE Exam (Yearly) Tentatively due on 4/25/2024 04/25/2023  SmartData: WORKFLOW - DIABETES - DIABETIC FOOT EXAM PERFORMED    04/25/2023  Diabetic Monofilament LE Exam    04/21/2022  SmartData: WORKFLOW - DIABETES - DIABETIC FOOT EXAM PERFORMED    10/19/2021  SmartData: WORKFLOW - DIABETES - DIABETIC FOOT EXAM PERFORMED    10/19/2021  Diabetic Monofilament LE Exam    Only the first 5 history entries have been loaded, but more history exists.              Ordered - A1c Screening (Every 6 Months) Ordered on 2/27/2024 02/20/2024  HEMOGLOBIN A1C    10/17/2023  HEMOGLOBIN A1C    04/19/2023  HEMOGLOBIN A1C    10/18/2022  HEMOGLOBIN A1C    04/11/2022  HEMOGLOBIN A1C    Only the first 5 history entries have been  loaded, but more history exists.              Diabetes: Urine Protein Screening (Yearly) Next due on 10/25/2024      10/25/2023  MICROALBUMIN CREAT RATIO URINE    10/24/2022  MICROALBUMIN CREAT RATIO URINE    10/19/2021  Microalbumin Creat Ratio Urine (Lab Collect)    10/26/2020  MICROALBUMIN CREAT RATIO URINE    04/01/2019  MICROALBUMIN CREAT RATIO URINE    Only the first 5 history entries have been loaded, but more history exists.              Fasting Lipid Profile (Yearly) Next due on 2/20/2025 02/20/2024  Lipid Profile    04/19/2023  Lipid Profile    04/11/2022  Lipid Profile    04/13/2021  Lipid Profile    07/16/2020  Lipid Profile    Only the first 5 history entries have been loaded, but more history exists.              SERUM CREATININE (Yearly) Next due on 2/20/2025 02/20/2024  Comp Metabolic Panel    10/18/2022  Comp Metabolic Panel    04/11/2022  Comp Metabolic Panel    10/14/2021  Comp Metabolic Panel    04/13/2021  Comp Metabolic Panel    Only the first 5 history entries have been loaded, but more history exists.              Annual Wellness Visit (Yearly) Next due on 2/27/2025 02/27/2024  Prob Dx: Medicare annual wellness visit, subsequent    02/27/2024  Visit Dx: Medicare annual wellness visit, subsequent    07/22/2020  Subsequent Annual Wellness Visit - Includes PPPS ()    07/22/2020  Visit Dx: Medicare annual wellness visit, subsequent    04/05/2019  Visit Dx: Medicare annual wellness visit, subsequent    Only the first 5 history entries have been loaded, but more history exists.              Colorectal Cancer Screening (Colonoscopy - Every 5 Years) Next due on 2/9/2028 02/09/2023  AMB EXTERNAL COLONOSCOPY RESULTS    02/09/2023  AMB EXTERNAL COLONOSCOPY RESULTS    02/08/2023  AMB EXTERNAL COLONOSCOPY RESULTS    02/08/2023  AMB EXTERNAL COLONOSCOPY RESULTS    01/05/2023  AMB EXTERNAL COLONOSCOPY RESULTS    Only the first 5 history entries have been loaded, but more history  exists.              IMM DTaP/Tdap/Td Vaccine (3 - Td or Tdap) Next due on 4/25/2033 04/25/2023  Imm Admin: Tdap Vaccine    10/11/2012  Imm Admin: Tdap Vaccine              Pneumococcal Vaccine: 65+ Years (Series Information) Completed      04/05/2019  Imm Admin: Pneumococcal polysaccharide vaccine (PPSV-23)    09/30/2015  Imm Admin: Pneumococcal Conjugate Vaccine (Prevnar/PCV-13)    10/11/2013  Imm Admin: Pneumococcal polysaccharide vaccine (PPSV-23)              Hepatitis C Screening  Tentatively Complete      07/16/2020  Hepatitis C Antibody component of HEP C VIRUS ANTIBODY              Abdominal Aortic Aneurysm (AAA) Screening  Tentatively Complete      12/01/2022  US-ABDOMINAL AORTA W/O DOPPLER              Zoster (Shingles) Vaccines (Series Information) Completed      09/20/2023  Imm Admin: Zoster Vaccine Recombinant (RZV) (SHINGRIX)    05/25/2023  Imm Admin: Zoster Vaccine Recombinant (RZV) (SHINGRIX)    09/29/2016  Imm Admin: Zoster Vaccine Live (ZVL) (Zostavax) - HISTORICAL DATA              Influenza Vaccine (Series Information) Completed      10/16/2023  Imm Admin: Influenza Vaccine, Quadrivalent, Adjuvanted (Pf)    10/11/2022  Imm Admin: Influenza Vaccine Adult HD    10/19/2021  Imm Admin: Influenza Vaccine Adult HD    10/14/2020  Imm Admin: Influenza Vaccine Adult HD    10/05/2019  Imm Admin: Influenza Vaccine Adult HD    Only the first 5 history entries have been loaded, but more history exists.              Hepatitis A Vaccine (Hep A) (Series Information) Aged Out      No completion history exists for this topic.              Hepatitis B Vaccine (Hep B) (Series Information) Aged Out      No completion history exists for this topic.              HPV Vaccines (Series Information) Aged Out      No completion history exists for this topic.              Polio Vaccine (Inactivated Polio) (Series Information) Aged Out      No completion history exists for this topic.              Meningococcal  Immunization (Series Information) Aged Out      No completion history exists for this topic.                    Patient Care Team:  Pooja Marte M.D. as PCP - General (Internal Medicine)  Avila Coker M.D. as PCP - [facility] Paneled  Courtney Noyola R.N. as Medical Home Care Manager  Anitra Mueller C.N.A. as [facility]   Aditi Patton O.D. (Optometry)    Social History     Tobacco Use    Smoking status: Former     Current packs/day: 0.00     Average packs/day: 1 pack/day for 12.0 years (12.0 ttl pk-yrs)     Types: Cigarettes     Start date: 1967     Quit date: 1979     Years since quittin.1    Smokeless tobacco: Never   Vaping Use    Vaping Use: Never used   Substance Use Topics    Alcohol use: Not Currently     Alcohol/week: 0.6 oz     Types: 1 Glasses of wine per week     Comment: 1 glass/week    Drug use: No     Family History   Problem Relation Age of Onset    Stroke Mother 75    Heart Disease Father         bypass    Heart Disease Brother 59        MI, smoker    Heart Disease Maternal Uncle     Heart Disease Paternal Uncle     Stroke Maternal Grandmother     Heart Disease Maternal Grandfather         MI    Diabetes Paternal Grandmother         type 1    Cancer Paternal Grandmother         stomach    Heart Disease Paternal Grandfather         MI     He  has a past medical history of CAD (coronary artery disease) (3/22/2010), CATARACT (3/28/2012), Cataract, diabetic (HCC) (2015), Dyslipidemia (3/22/2010), High cholesterol, HTN (hypertension), benign (3/22/2010), Hypertension, MEDICAL HOME, and Type II or unspecified type diabetes mellitus without mention of complication, not stated as uncontrolled.   Past Surgical History:   Procedure Laterality Date    PB WRIST ARTHROSCOP,RELEASE XVERS LIG Right 2019    Procedure: RELEASE, CARPAL TUNNEL, ENDOSCOPIC;  Surgeon: Titus Lopez M.D.;  Location: SURGERY [facility];  Service: Orthopedics    ID  "NEUROPLASTY & OR TRANSPOS ULNAR NERVE ELBOW  09/11/2019    Procedure: RELEASE, CUBITAL TUNNEL;  Surgeon: Titus Lopez M.D.;  Location: SURGERY [facility];  Service: Orthopedics    HIP ARTHROPLASTY TOTAL  01/20/2015    Performed by Ziyad Tang M.D. at SURGERY [facility]    HIP ARTHROPLASTY TOTAL  10/21/2014    Performed by Ziyad Tang M.D. at SURGERY [facility]    KNEE ARTHROSCOPY Right 1990    EYE SURGERY      TONSILLECTOMY         Exam:   /82 (BP Location: Left arm, Patient Position: Sitting, BP Cuff Size: Adult)   Pulse 91   Temp 36.6 °C (97.8 °F) (Temporal)   Ht 1.905 m (6' 3\")   Wt 106 kg (233 lb 11 oz)   SpO2 97%  Body mass index is 29.21 kg/m².    Physical Exam  Vitals reviewed.   Constitutional:       Appearance: Normal appearance.   HENT:      Head: Normocephalic and atraumatic.   Cardiovascular:      Rate and Rhythm: Normal rate and regular rhythm.      Pulses: Normal pulses.      Heart sounds: Normal heart sounds. No murmur heard.  Pulmonary:      Effort: Pulmonary effort is normal. No respiratory distress.      Breath sounds: Normal breath sounds.   Neurological:      General: No focal deficit present.      Mental Status: He is alert and oriented to person, place, and time.       Hearing good.    Dentition good  Alert, oriented in no acute distress.  Eye contact is good, speech goal directed, affect calm    Assessment and Plan. The following treatment and monitoring plan is recommended:    Problem List Items Addressed This Visit       Atherosclerosis of abdominal aorta (HCC) (Chronic)     Continue statin and aspirin         Controlled type 2 diabetes mellitus, without long-term current use of insulin (HCC) (Chronic)    Relevant Orders    HEMOGLOBIN A1C    Medicare annual wellness visit, subsequent     Other Visit Diagnoses       Prostate cancer screening        Relevant Orders    PROSTATE SPECIFIC AG SCREENING            Services suggested: No services " needed at this time  Health Care Screening: Age-appropriate preventive services recommended by USPTF and ACIP covered by Medicare were discussed today. Services ordered if indicated and agreed upon by the patient.  Referrals offered: Community-based lifestyle interventions to reduce health risks and promote self-management and wellness, fall prevention, nutrition, physical activity, tobacco-use cessation, weight loss, and mental health services as per orders if indicated.    Discussion today about general wellness and lifestyle habits:    Prevent falls and reduce trip hazards; Cautioned about securing or removing rugs.  Have a working fire alarm and carbon monoxide detector;   Engage in regular physical activity and social activities     Follow-up: Return in about 4 months (around 6/27/2024), or if symptoms worsen or fail to improve.    Please note that this dictation was created using voice recognition software. I have made every reasonable attempt to correct obvious errors, but I expect that there are errors of grammar and possibly content that I did not discover before finalizing the note.

## 2024-02-27 NOTE — LETTER
[employer]  Pooja Marte M.D.  [address]  [place] [address]  Fax: [phone]   Authorization for Release/Disclosure of   Protected Health Information   Name: MATTHEW ALFRED : 1950 SSN: [id number]   Address: [address] [place] [address]  [place] [address] Phone:    [phone] (home)    I authorize the entity listed below to release/disclose the PHI below to:   [employer]/Pooja Marte M.D. and Pooja Marte M.D.   Provider or Entity Name:  Dr Britni Patton    Address   [facility]   Phone:      Fax:     Reason for request: continuity of care   Information to be released:    [  ] LAST COLONOSCOPY,  including any PATH REPORT and follow-up  [  ] LAST FIT/COLOGUARD RESULT [  ] LAST DEXA  [  ] LAST MAMMOGRAM  [  ] LAST PAP  [  ] LAST LABS [ x ] RETINA EXAM REPORT  [  ] IMMUNIZATION RECORDS  [x  ] Release all info      [  ] Check here and initial the line next to each item to release ALL health information INCLUDING  _____ Care and treatment for drug and / or alcohol abuse  _____ HIV testing, infection status, or AIDS  _____ Genetic Testing    DATES OF SERVICE OR TIME PERIOD TO BE DISCLOSED: _____________  I understand and acknowledge that:  * This Authorization may be revoked at any time by you in writing, except if your health information has already been used or disclosed.  * Your health information that will be used or disclosed as a result of you signing this authorization could be re-disclosed by the recipient. If this occurs, your re-disclosed health information may no longer be protected by State or Federal laws.  * You may refuse to sign this Authorization. Your refusal will not affect your ability to obtain treatment.  * This Authorization becomes effective upon signing and will  on (date) __________.      If no date is indicated, this Authorization will  one (1) year from the signature date.    Name: Matthew Alfred  Signature:  Date:   2/27/2024     PLEASE FAX REQUESTED RECORDS BACK TO: [phone]

## 2024-02-27 NOTE — LETTER
[employer]  Pooja Marte M.D.  [address]  [place] [address]  Fax: [phone]   Authorization for Release/Disclosure of   Protected Health Information   Name: MATTHEW ALFRED : 1950 SSN: [id number]   Address: [address] [place] [address]  [place] [address] Phone:    [phone] (home)    I authorize the entity listed below to release/disclose the PHI below to:   [facility]/Pooja Marte M.D. and Pooja Marte M.D.   Provider or Entity Name:     Address   City, State, Zip   Phone:      Fax:     Reason for request: continuity of care   Information to be released:    [  ] LAST COLONOSCOPY,  including any PATH REPORT and follow-up  [  ] LAST FIT/COLOGUARD RESULT [  ] LAST DEXA  [  ] LAST MAMMOGRAM  [  ] LAST PAP  [  ] LAST LABS [ XXX ] RETINA EXAM REPORT  [  ] IMMUNIZATION RECORDS  [  ] Release all info      [  ] Check here and initial the line next to each item to release ALL health information INCLUDING  _____ Care and treatment for drug and / or alcohol abuse  _____ HIV testing, infection status, or AIDS  _____ Genetic Testing    DATES OF SERVICE OR TIME PERIOD TO BE DISCLOSED: _____________  I understand and acknowledge that:  * This Authorization may be revoked at any time by you in writing, except if your health information has already been used or disclosed.  * Your health information that will be used or disclosed as a result of you signing this authorization could be re-disclosed by the recipient. If this occurs, your re-disclosed health information may no longer be protected by State or Federal laws.  * You may refuse to sign this Authorization. Your refusal will not affect your ability to obtain treatment.  * This Authorization becomes effective upon signing and will  on (date) __________.      If no date is indicated, this Authorization will  one (1) year from the signature date.    Name: Matthew Alfred  Signature: Continue care   Date:   2/27/2024     PLEASE FAX REQUESTED RECORDS BACK TO: [phone]

## 2024-05-04 DIAGNOSIS — E11.69 CONTROLLED TYPE 2 DIABETES MELLITUS WITH OTHER SPECIFIED COMPLICATION, WITHOUT LONG-TERM CURRENT USE OF INSULIN (HCC): ICD-10-CM

## 2024-05-04 PROCEDURE — RXMED WILLOW AMBULATORY MEDICATION CHARGE: Performed by: INTERNAL MEDICINE

## 2024-05-06 RX ORDER — AMLODIPINE BESYLATE 2.5 MG/1
2.5 TABLET ORAL EVERY EVENING
Qty: 100 TABLET | Refills: 3 | Status: SHIPPED | OUTPATIENT
Start: 2024-05-06

## 2024-05-07 PROCEDURE — RXMED WILLOW AMBULATORY MEDICATION CHARGE: Performed by: INTERNAL MEDICINE

## 2024-05-09 ENCOUNTER — PHARMACY VISIT (OUTPATIENT)
Dept: PHARMACY | Facility: MEDICAL CENTER | Age: 74
End: 2024-05-09
Payer: COMMERCIAL

## 2024-06-18 ENCOUNTER — HOSPITAL ENCOUNTER (OUTPATIENT)
Dept: LAB | Facility: MEDICAL CENTER | Age: 74
End: 2024-06-18
Attending: INTERNAL MEDICINE
Payer: MEDICARE

## 2024-06-18 DIAGNOSIS — Z12.5 PROSTATE CANCER SCREENING: ICD-10-CM

## 2024-06-18 DIAGNOSIS — E11.69 CONTROLLED TYPE 2 DIABETES MELLITUS WITH OTHER SPECIFIED COMPLICATION, WITHOUT LONG-TERM CURRENT USE OF INSULIN (HCC): Chronic | ICD-10-CM

## 2024-06-18 LAB
EST. AVERAGE GLUCOSE BLD GHB EST-MCNC: 137 MG/DL
HBA1C MFR BLD: 6.4 % (ref 4–5.6)
PSA SERPL-MCNC: 3.3 NG/ML (ref 0–4)

## 2024-06-18 PROCEDURE — 84153 ASSAY OF PSA TOTAL: CPT

## 2024-06-18 PROCEDURE — 83036 HEMOGLOBIN GLYCOSYLATED A1C: CPT

## 2024-06-18 PROCEDURE — 36415 COLL VENOUS BLD VENIPUNCTURE: CPT

## 2024-06-25 ENCOUNTER — OFFICE VISIT (OUTPATIENT)
Dept: MEDICAL GROUP | Facility: MEDICAL CENTER | Age: 74
End: 2024-06-25
Payer: MEDICARE

## 2024-06-25 VITALS
OXYGEN SATURATION: 95 % | TEMPERATURE: 96.8 F | SYSTOLIC BLOOD PRESSURE: 138 MMHG | DIASTOLIC BLOOD PRESSURE: 88 MMHG | BODY MASS INDEX: 27.95 KG/M2 | HEIGHT: 75 IN | WEIGHT: 224.8 LBS | HEART RATE: 96 BPM

## 2024-06-25 DIAGNOSIS — I10 ESSENTIAL HYPERTENSION: Chronic | ICD-10-CM

## 2024-06-25 DIAGNOSIS — I70.0 ATHEROSCLEROSIS OF ABDOMINAL AORTA (HCC): Chronic | ICD-10-CM

## 2024-06-25 DIAGNOSIS — E11.69 CONTROLLED TYPE 2 DIABETES MELLITUS WITH OTHER SPECIFIED COMPLICATION, WITHOUT LONG-TERM CURRENT USE OF INSULIN (HCC): ICD-10-CM

## 2024-06-25 DIAGNOSIS — E55.9 VITAMIN D DEFICIENCY: ICD-10-CM

## 2024-06-25 PROCEDURE — 3075F SYST BP GE 130 - 139MM HG: CPT | Performed by: INTERNAL MEDICINE

## 2024-06-25 PROCEDURE — 99214 OFFICE O/P EST MOD 30 MIN: CPT | Performed by: INTERNAL MEDICINE

## 2024-06-25 PROCEDURE — 3078F DIAST BP <80 MM HG: CPT | Performed by: INTERNAL MEDICINE

## 2024-06-25 RX ORDER — AMLODIPINE BESYLATE 5 MG/1
5 TABLET ORAL EVERY EVENING
Qty: 100 TABLET | Refills: 3 | Status: SHIPPED | OUTPATIENT
Start: 2024-06-25

## 2024-06-25 ASSESSMENT — ENCOUNTER SYMPTOMS: SHORTNESS OF BREATH: 0

## 2024-06-25 ASSESSMENT — FIBROSIS 4 INDEX: FIB4 SCORE: 1.221366258413127542

## 2024-06-25 NOTE — PROGRESS NOTES
CC:   Chief Complaint   Patient presents with    Hypertension Follow-up     Retina eye exam Sept 2023  [facility]     Diagnoses of Controlled type 2 diabetes mellitus with other specified complication, without long-term current use of insulin (HCC), Essential hypertension, Vitamin D deficiency, and Atherosclerosis of abdominal aorta (HCC) were pertinent to this visit.  Verbal consent was acquired by the patient to use [employer] ambient listening note generation during this visit Yes      History of Present Illness  Matthew is a pleasant 74 y.o. male who presents today to discuss the following problems:     The patient's blood pressure remains elevated, albeit not severe. His current medication regimen includes amlodipine 2.5 mg and lisinopril, taken in the evening.     He has experienced weight gain, but has lost nearly 10 pounds in the past 4 months. Previously, he was able to lose 5 pounds in a week. His glucose levels have improved, but his blood pressure remains elevated. He maintains a daily water intake of at least 64 ounces. He maintains an active lifestyle, albeit with reduced physical activity due to his wife's back problems.    The patient experiences foot swelling during the day, which subsides when he elevates his feet at night. However, prolonged periods of activity results in reinfection. He wore compression socks during the winter months.    Past Medical History:   Diagnosis Date    CAD (coronary artery disease) 3/22/2010    CATARACT 3/28/2012    Cataract, diabetic (HCC) 4/2/2015    Dyslipidemia 3/22/2010    High cholesterol     HTN (hypertension), benign 3/22/2010    Hypertension     MEDICAL HOME     Type II or unspecified type diabetes mellitus without mention of complication, not stated as uncontrolled     oral meds       Current Outpatient Medications Ordered in Epic   Medication Sig Dispense Refill    amLODIPine (NORVASC) 5 MG Tab Take 1 Tablet by mouth every evening. 100 Tablet 3     "metFORMIN (GLUCOPHAGE) 500 MG Tab Take 1 Tablet by mouth 2 times a day with meals. 200 Tablet 3    atorvastatin (LIPITOR) 40 MG Tab Take 1 Tablet by mouth every evening. 100 Tablet 3    hydroCHLOROthiazide 25 MG Tab Take 1 tablet by mouth every day. 100 Tablet 3    lisinopril (PRINIVIL) 40 MG tablet Take 1 Tablet by mouth every day. 100 Tablet 3    aspirin (ASA) 81 MG Chew Tab chewable tablet Chew 81 mg every day.      Coenzyme Q10 (CO Q 10 PO) Take  by mouth 2 Times a Day.      therapeutic multivitamin-minerals (THERAGRAN-M) TABS Take 1 Tab by mouth every morning.       No current [facility]-ordered facility-administered medications on file.     Review of Systems   Respiratory:  Negative for shortness of breath.    Cardiovascular:  Negative for chest pain.   All other systems reviewed and are negative.    Objective:     Exam:  BP (!) 146/72   Pulse 96   Temp 36 °C (96.8 °F) (Temporal)   Ht 1.905 m (6' 3\")   Wt 102 kg (224 lb 12.8 oz)   SpO2 95%   BMI 28.10 kg/m²  Body mass index is 28.1 kg/m².    Physical Exam  Constitutional:       Appearance: Normal appearance.   HENT:      Head: Normocephalic and atraumatic.   Cardiovascular:      Rate and Rhythm: Normal rate and regular rhythm.      Pulses: Normal pulses.      Heart sounds: Normal heart sounds. No murmur heard.  Musculoskeletal:      Right lower leg: No edema.      Left lower leg: No edema.   Neurological:      General: No focal deficit present.      Mental Status: He is alert and oriented to person, place, and time.   Psychiatric:         Mood and Affect: Mood normal.         Behavior: Behavior normal.         Thought Content: Thought content normal.         Judgment: Judgment normal.       Results  Laboratory Studies  A1c is 6.4.    Assessment & Plan:   Matthew  is a pleasant 74 y.o. male with the following -   Assessment & Plan  1. Hypertension  This is a chronic condition, yet suboptimally controlled. Today, the dosage of amlodipine has been increased to 5 " mg daily, while the continuation of hydrochlorothiazide 25 mg and lisinopril 40 mg daily is advised. A low-sodium diet and healthy lifestyle changes are also recommended.    2. Type 2 diabetes:  This is a chronic condition and is well-managed with metformin 500 mg twice daily. The most recent A1c is 6.4 percent, indicating a positive response to diet and exercise. The current regimen will be maintained, with a repeat A1c in 4 months.    Follow-up  The patient is scheduled for a follow-up visit in the office in 4 months.    Problem List Items Addressed This Visit       Atherosclerosis of abdominal aorta (HCC) (Chronic)    Relevant Medications    amLODIPine (NORVASC) 5 MG Tab    Other Relevant Orders    Lipid Profile    Controlled type 2 diabetes mellitus, without long-term current use of insulin (HCC) (Chronic)    Relevant Orders    Diabetic Monofilament LE Exam (Completed)    HEMOGLOBIN A1C    Essential hypertension (Chronic)    Relevant Medications    amLODIPine (NORVASC) 5 MG Tab    Other Relevant Orders    Lipid Profile    CBC WITH DIFFERENTIAL    Comp Metabolic Panel    TSH WITH REFLEX TO FT4     Other Visit Diagnoses       Vitamin D deficiency        Relevant Orders    VITAMIN D,25 HYDROXY (DEFICIENCY)          Return in about 4 months (around 10/25/2024), or if symptoms worsen or fail to improve, for follow up on DM, follow up on lab results.    Please note that this dictation was created using voice recognition software. I have made every reasonable attempt to correct obvious errors, but I expect that there are errors of grammar and possibly content that I did not discover before finalizing the note.

## 2024-07-24 ENCOUNTER — PHARMACY VISIT (OUTPATIENT)
Dept: PHARMACY | Facility: MEDICAL CENTER | Age: 74
End: 2024-07-24
Payer: COMMERCIAL

## 2024-07-24 PROCEDURE — RXMED WILLOW AMBULATORY MEDICATION CHARGE: Performed by: INTERNAL MEDICINE

## 2024-10-16 ENCOUNTER — HOSPITAL ENCOUNTER (OUTPATIENT)
Dept: LAB | Facility: MEDICAL CENTER | Age: 74
End: 2024-10-16
Attending: INTERNAL MEDICINE
Payer: MEDICARE

## 2024-10-16 DIAGNOSIS — E11.69 CONTROLLED TYPE 2 DIABETES MELLITUS WITH OTHER SPECIFIED COMPLICATION, WITHOUT LONG-TERM CURRENT USE OF INSULIN (HCC): ICD-10-CM

## 2024-10-16 DIAGNOSIS — I70.0 ATHEROSCLEROSIS OF ABDOMINAL AORTA (HCC): Chronic | ICD-10-CM

## 2024-10-16 DIAGNOSIS — I10 ESSENTIAL HYPERTENSION: Chronic | ICD-10-CM

## 2024-10-16 DIAGNOSIS — E55.9 VITAMIN D DEFICIENCY: ICD-10-CM

## 2024-10-16 LAB
25(OH)D3 SERPL-MCNC: 32 NG/ML (ref 30–100)
ALBUMIN SERPL BCP-MCNC: 4 G/DL (ref 3.2–4.9)
ALBUMIN/GLOB SERPL: 1.5 G/DL
ALP SERPL-CCNC: 98 U/L (ref 30–99)
ALT SERPL-CCNC: 23 U/L (ref 2–50)
ANION GAP SERPL CALC-SCNC: 11 MMOL/L (ref 7–16)
AST SERPL-CCNC: 20 U/L (ref 12–45)
BASOPHILS # BLD AUTO: 1.2 % (ref 0–1.8)
BASOPHILS # BLD: 0.07 K/UL (ref 0–0.12)
BILIRUB SERPL-MCNC: 0.5 MG/DL (ref 0.1–1.5)
BUN SERPL-MCNC: 12 MG/DL (ref 8–22)
CALCIUM ALBUM COR SERPL-MCNC: 9.6 MG/DL (ref 8.5–10.5)
CALCIUM SERPL-MCNC: 9.6 MG/DL (ref 8.4–10.2)
CHLORIDE SERPL-SCNC: 100 MMOL/L (ref 96–112)
CHOLEST SERPL-MCNC: 107 MG/DL (ref 100–199)
CO2 SERPL-SCNC: 26 MMOL/L (ref 20–33)
CREAT SERPL-MCNC: 0.75 MG/DL (ref 0.5–1.4)
EOSINOPHIL # BLD AUTO: 0.1 K/UL (ref 0–0.51)
EOSINOPHIL NFR BLD: 1.7 % (ref 0–6.9)
ERYTHROCYTE [DISTWIDTH] IN BLOOD BY AUTOMATED COUNT: 47.2 FL (ref 35.9–50)
EST. AVERAGE GLUCOSE BLD GHB EST-MCNC: 151 MG/DL
FASTING STATUS PATIENT QL REPORTED: NORMAL
GFR SERPLBLD CREATININE-BSD FMLA CKD-EPI: 94 ML/MIN/1.73 M 2
GLOBULIN SER CALC-MCNC: 2.7 G/DL (ref 1.9–3.5)
GLUCOSE SERPL-MCNC: 128 MG/DL (ref 65–99)
HBA1C MFR BLD: 6.9 % (ref 4–5.6)
HCT VFR BLD AUTO: 45.8 % (ref 42–52)
HDLC SERPL-MCNC: 32 MG/DL
HGB BLD-MCNC: 15.4 G/DL (ref 14–18)
IMM GRANULOCYTES # BLD AUTO: 0.01 K/UL (ref 0–0.11)
IMM GRANULOCYTES NFR BLD AUTO: 0.2 % (ref 0–0.9)
LDLC SERPL CALC-MCNC: 60 MG/DL
LYMPHOCYTES # BLD AUTO: 1.91 K/UL (ref 1–4.8)
LYMPHOCYTES NFR BLD: 32.9 % (ref 22–41)
MCH RBC QN AUTO: 31.2 PG (ref 27–33)
MCHC RBC AUTO-ENTMCNC: 33.6 G/DL (ref 32.3–36.5)
MCV RBC AUTO: 92.9 FL (ref 81.4–97.8)
MONOCYTES # BLD AUTO: 0.61 K/UL (ref 0–0.85)
MONOCYTES NFR BLD AUTO: 10.5 % (ref 0–13.4)
NEUTROPHILS # BLD AUTO: 3.1 K/UL (ref 1.82–7.42)
NEUTROPHILS NFR BLD: 53.5 % (ref 44–72)
NRBC # BLD AUTO: 0 K/UL
NRBC BLD-RTO: 0 /100 WBC (ref 0–0.2)
PLATELET # BLD AUTO: 309 K/UL (ref 164–446)
PMV BLD AUTO: 9.8 FL (ref 9–12.9)
POTASSIUM SERPL-SCNC: 3.9 MMOL/L (ref 3.6–5.5)
PROT SERPL-MCNC: 6.7 G/DL (ref 6–8.2)
RBC # BLD AUTO: 4.93 M/UL (ref 4.7–6.1)
SODIUM SERPL-SCNC: 137 MMOL/L (ref 135–145)
TRIGL SERPL-MCNC: 77 MG/DL (ref 0–149)
TSH SERPL DL<=0.005 MIU/L-ACNC: 3.07 UIU/ML (ref 0.38–5.33)
WBC # BLD AUTO: 5.8 K/UL (ref 4.8–10.8)

## 2024-10-16 PROCEDURE — 80053 COMPREHEN METABOLIC PANEL: CPT

## 2024-10-16 PROCEDURE — 36415 COLL VENOUS BLD VENIPUNCTURE: CPT

## 2024-10-16 PROCEDURE — 84443 ASSAY THYROID STIM HORMONE: CPT

## 2024-10-16 PROCEDURE — 85025 COMPLETE CBC W/AUTO DIFF WBC: CPT

## 2024-10-16 PROCEDURE — 82306 VITAMIN D 25 HYDROXY: CPT

## 2024-10-16 PROCEDURE — 83036 HEMOGLOBIN GLYCOSYLATED A1C: CPT

## 2024-10-16 PROCEDURE — 80061 LIPID PANEL: CPT

## 2024-10-24 ENCOUNTER — HOSPITAL ENCOUNTER (OUTPATIENT)
Facility: MEDICAL CENTER | Age: 74
End: 2024-10-24
Attending: INTERNAL MEDICINE
Payer: MEDICARE

## 2024-10-24 ENCOUNTER — OFFICE VISIT (OUTPATIENT)
Dept: MEDICAL GROUP | Facility: MEDICAL CENTER | Age: 74
End: 2024-10-24
Payer: MEDICARE

## 2024-10-24 VITALS
OXYGEN SATURATION: 96 % | HEIGHT: 75 IN | DIASTOLIC BLOOD PRESSURE: 60 MMHG | SYSTOLIC BLOOD PRESSURE: 140 MMHG | BODY MASS INDEX: 28.37 KG/M2 | TEMPERATURE: 98 F | HEART RATE: 77 BPM | WEIGHT: 228.18 LBS

## 2024-10-24 DIAGNOSIS — I10 ESSENTIAL HYPERTENSION: Chronic | ICD-10-CM

## 2024-10-24 DIAGNOSIS — E11.69 CONTROLLED TYPE 2 DIABETES MELLITUS WITH OTHER SPECIFIED COMPLICATION, WITHOUT LONG-TERM CURRENT USE OF INSULIN (HCC): ICD-10-CM

## 2024-10-24 DIAGNOSIS — E78.5 DYSLIPIDEMIA: Chronic | ICD-10-CM

## 2024-10-24 DIAGNOSIS — I10 HTN (HYPERTENSION), BENIGN: Chronic | ICD-10-CM

## 2024-10-24 PROCEDURE — 99214 OFFICE O/P EST MOD 30 MIN: CPT | Performed by: INTERNAL MEDICINE

## 2024-10-24 PROCEDURE — 82043 UR ALBUMIN QUANTITATIVE: CPT

## 2024-10-24 PROCEDURE — 3077F SYST BP >= 140 MM HG: CPT | Performed by: INTERNAL MEDICINE

## 2024-10-24 PROCEDURE — RXMED WILLOW AMBULATORY MEDICATION CHARGE: Performed by: INTERNAL MEDICINE

## 2024-10-24 PROCEDURE — 82570 ASSAY OF URINE CREATININE: CPT

## 2024-10-24 PROCEDURE — 3078F DIAST BP <80 MM HG: CPT | Performed by: INTERNAL MEDICINE

## 2024-10-24 RX ORDER — AMLODIPINE BESYLATE 5 MG/1
5 TABLET ORAL EVERY EVENING
Qty: 100 TABLET | Refills: 3 | Status: SHIPPED | OUTPATIENT
Start: 2024-10-24

## 2024-10-24 RX ORDER — LISINOPRIL 40 MG/1
40 TABLET ORAL DAILY
Qty: 100 TABLET | Refills: 3 | Status: SHIPPED | OUTPATIENT
Start: 2024-10-24

## 2024-10-24 RX ORDER — ATORVASTATIN CALCIUM 40 MG/1
40 TABLET, FILM COATED ORAL EVERY EVENING
Qty: 100 TABLET | Refills: 3 | Status: SHIPPED | OUTPATIENT
Start: 2024-10-24

## 2024-10-24 RX ORDER — HYDROCHLOROTHIAZIDE 25 MG/1
25 TABLET ORAL DAILY
Qty: 100 TABLET | Refills: 3 | Status: SHIPPED | OUTPATIENT
Start: 2024-10-24

## 2024-10-24 ASSESSMENT — FIBROSIS 4 INDEX: FIB4 SCORE: 1.01

## 2024-10-25 LAB
CREAT UR-MCNC: 63 MG/DL
MICROALBUMIN UR-MCNC: 1.4 MG/DL
MICROALBUMIN/CREAT UR: 22 MG/G (ref 0–30)

## 2024-11-04 ENCOUNTER — PHARMACY VISIT (OUTPATIENT)
Dept: PHARMACY | Facility: MEDICAL CENTER | Age: 74
End: 2024-11-04
Payer: COMMERCIAL

## 2024-11-15 ENCOUNTER — TELEPHONE (OUTPATIENT)
Dept: HEALTH INFORMATION MANAGEMENT | Facility: OTHER | Age: 74
End: 2024-11-15
Payer: MEDICARE

## 2024-12-02 ENCOUNTER — PATIENT MESSAGE (OUTPATIENT)
Dept: HEALTH INFORMATION MANAGEMENT | Facility: OTHER | Age: 74
End: 2024-12-02

## 2024-12-02 ENCOUNTER — PATIENT OUTREACH (OUTPATIENT)
Dept: HEALTH INFORMATION MANAGEMENT | Facility: OTHER | Age: 74
End: 2024-12-02
Payer: MEDICARE

## 2024-12-02 DIAGNOSIS — E11.69 DM TYPE 2 WITH DIABETIC MIXED HYPERLIPIDEMIA (HCC): ICD-10-CM

## 2024-12-02 DIAGNOSIS — E78.2 DM TYPE 2 WITH DIABETIC MIXED HYPERLIPIDEMIA (HCC): ICD-10-CM

## 2024-12-24 NOTE — PROGRESS NOTES
Pt qualifies for PCM service. 3 successful attempts made to reach pt for PCM enrollment. Pt has received [employer] message with PCM information should he decide to reach out for PCM service.

## 2025-01-21 ENCOUNTER — APPOINTMENT (OUTPATIENT)
Dept: MEDICAL GROUP | Facility: MEDICAL CENTER | Age: 75
End: 2025-01-21
Payer: MEDICARE

## 2025-01-21 VITALS
DIASTOLIC BLOOD PRESSURE: 74 MMHG | HEIGHT: 75 IN | OXYGEN SATURATION: 99 % | RESPIRATION RATE: 16 BRPM | HEART RATE: 68 BPM | BODY MASS INDEX: 27.35 KG/M2 | WEIGHT: 220 LBS | SYSTOLIC BLOOD PRESSURE: 144 MMHG | TEMPERATURE: 97.9 F

## 2025-01-21 DIAGNOSIS — E11.69 DM TYPE 2 WITH DIABETIC MIXED HYPERLIPIDEMIA (HCC): ICD-10-CM

## 2025-01-21 DIAGNOSIS — E78.2 DM TYPE 2 WITH DIABETIC MIXED HYPERLIPIDEMIA (HCC): ICD-10-CM

## 2025-01-21 DIAGNOSIS — E11.69 CONTROLLED TYPE 2 DIABETES MELLITUS WITH OTHER SPECIFIED COMPLICATION, WITHOUT LONG-TERM CURRENT USE OF INSULIN (HCC): Chronic | ICD-10-CM

## 2025-01-21 DIAGNOSIS — E61.1 IRON DEFICIENCY: ICD-10-CM

## 2025-01-21 DIAGNOSIS — G62.9 NEUROPATHY: ICD-10-CM

## 2025-01-21 DIAGNOSIS — I10 ESSENTIAL HYPERTENSION: Chronic | ICD-10-CM

## 2025-01-21 LAB
HBA1C MFR BLD: 5.9 % (ref ?–5.8)
POCT INT CON NEG: NEGATIVE
POCT INT CON POS: POSITIVE

## 2025-01-21 PROCEDURE — 3077F SYST BP >= 140 MM HG: CPT | Performed by: INTERNAL MEDICINE

## 2025-01-21 PROCEDURE — 3078F DIAST BP <80 MM HG: CPT | Performed by: INTERNAL MEDICINE

## 2025-01-21 PROCEDURE — 83036 HEMOGLOBIN GLYCOSYLATED A1C: CPT | Performed by: INTERNAL MEDICINE

## 2025-01-21 PROCEDURE — 99214 OFFICE O/P EST MOD 30 MIN: CPT | Performed by: INTERNAL MEDICINE

## 2025-01-21 PROCEDURE — RXMED WILLOW AMBULATORY MEDICATION CHARGE: Performed by: INTERNAL MEDICINE

## 2025-01-21 RX ORDER — GABAPENTIN 100 MG/1
100-300 CAPSULE ORAL NIGHTLY
Qty: 300 CAPSULE | Refills: 1 | Status: SHIPPED | OUTPATIENT
Start: 2025-01-21

## 2025-01-21 RX ORDER — AMLODIPINE BESYLATE 10 MG/1
10 TABLET ORAL EVERY EVENING
Qty: 100 TABLET | Refills: 3 | Status: SHIPPED | OUTPATIENT
Start: 2025-01-21

## 2025-01-21 ASSESSMENT — ENCOUNTER SYMPTOMS
TINGLING: 1
SENSORY CHANGE: 1

## 2025-01-21 ASSESSMENT — PATIENT HEALTH QUESTIONNAIRE - PHQ9: CLINICAL INTERPRETATION OF PHQ2 SCORE: 0

## 2025-01-21 ASSESSMENT — FIBROSIS 4 INDEX: FIB4 SCORE: 1.01

## 2025-01-21 NOTE — PROGRESS NOTES
"CC:   Chief Complaint   Patient presents with    Diabetes Follow-up     3 months     Foot Swelling     X1 month, \" The pain swelling and tingling in my feet have become progressively worse.\"      Diagnoses of Controlled type 2 diabetes mellitus with other specified complication, without long-term current use of insulin (HCC), Essential hypertension, Neuropathy, Iron deficiency, and DM type 2 with diabetic mixed hyperlipidemia (HCC) were pertinent to this visit.  Verbal consent was acquired by the patient to use [employer] ambient listening note generation during this visit Yes     History of Present Illness  Matthew is a pleasant 74 y.o. male presenting for a diabetes follow-up, bilateral neuropathy, and hypertension.    He reports satisfactory blood glucose levels, with an A1c of 5.9. He has been adhering to a strict diet, abstaining from sweets during the Christmas season. He has been engaging in regular physical activity at a fitness center since December, although not on a daily basis. Despite these efforts, he expresses disappointment over his weight exceeding 220 pounds. He continues his metformin regimen, taking it twice daily.    He also reports persistent foot pain, which he describes as severe. He experiences daily foot swelling, which subsides when he lies down at night. However, he notes that the pain intensifies as the swelling decreases, often disrupting his sleep. He characterizes the pain as burning, accompanied by tingling sensations and numbness. He has been using compression socks for relief. He reports that his symptoms have worsened since he began attending the fitness center. 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 He currently experiences numbness in both feet and notes that they are beginning to swell slightly. He wears memory foam shoes and has not purchased new footwear recently. He takes a multivitamin every other day and alternates it with vitamin D3. " He has not been prescribed any medication for nerve pain. He does not see a podiatrist. He reports that his symptoms are more pronounced in the evening, occurring approximately once a week. He does not take magnesium supplements.    He acknowledges that his blood pressure remains elevated and is actively working to manage it. His daily routine includes a 20-minute walk before breakfast and an additional 20 minutes on the elliptical machine at the fitness center in the afternoon.    Past Medical History:   Diagnosis Date    CAD (coronary artery disease) 3/22/2010    CATARACT 3/28/2012    Cataract, diabetic (HCC) 4/2/2015    Dyslipidemia 3/22/2010    High cholesterol     HTN (hypertension), benign 3/22/2010    Hypertension     MEDICAL HOME     Type II or unspecified type diabetes mellitus without mention of complication, not stated as uncontrolled     oral meds       Current Outpatient Medications Ordered in Epic   Medication Sig Dispense Refill    amLODIPine (NORVASC) 10 MG Tab Take 1 Tablet by mouth every evening. 100 Tablet 3    gabapentin (NEURONTIN) 100 MG Cap Take 1-3 Capsules by mouth every evening. 300 Capsule 1    atorvastatin (LIPITOR) 40 MG Tab Take 1 Tablet by mouth every evening. 100 Tablet 3    hydroCHLOROthiazide 25 MG Tab Take 1 tablet by mouth every day. 100 Tablet 3    lisinopril (PRINIVIL) 40 MG tablet Take 1 Tablet by mouth every day. 100 Tablet 3    metFORMIN (GLUCOPHAGE) 500 MG Tab Take 1 Tablet by mouth 2 times a day with meals. 200 Tablet 3    aspirin (ASA) 81 MG Chew Tab chewable tablet Chew 81 mg every day.      Coenzyme Q10 (CO Q 10 PO) Take  by mouth 2 Times a Day.      therapeutic multivitamin-minerals (THERAGRAN-M) TABS Take 1 Tab by mouth every morning.       No current [facility]-ordered facility-administered medications on file.     Review of Systems   Neurological:  Positive for tingling and sensory change.   All other systems reviewed and are negative.    Objective:     Exam:  BP (!)  "144/74 (BP Location: Right arm, Patient Position: Sitting, BP Cuff Size: Adult)   Pulse 68   Temp 36.6 °C (97.9 °F) (Temporal)   Resp 16   Ht 1.905 m (6' 3\")   Wt 99.8 kg (220 lb)   SpO2 99%   BMI 27.50 kg/m²  Body mass index is 27.5 kg/m².    Physical Exam  Constitutional:       Appearance: Normal appearance.   HENT:      Head: Normocephalic and atraumatic.   Cardiovascular:      Rate and Rhythm: Normal rate and regular rhythm.      Pulses: Normal pulses.      Heart sounds: Normal heart sounds. No murmur heard.  Neurological:      Mental Status: He is alert.       Monofilament testing with a 10 gram force: sensation intact: decreased bilaterally  Visual Inspection: Feet without maceration, ulcers, fissures.  Pedal pulses: intact bilaterally    Results:  Results  Laboratory Studies  A1c is 5.9.    Assessment & Plan:   Matthew  is a pleasant 74 y.o. male with the following -   Assessment & Plan  1. Type 2 diabetes mellitus.  His A1c level is commendable at 5.9, indicating effective management of his diabetes. He will persist with his current metformin regimen, taking it twice daily.    2. Bilateral neuropathy.  He has strong bilateral pulses.  Most likely symptoms are secondary to diabetes neuropathy.  A prescription for gabapentin will be provided to manage his nerve pain. He is advised to commence with a lower dose and gradually increase it as needed. A referral to a podiatrist will be made for further evaluation and potential custom inserts. He is also advised to take magnesium glycinate 250 mg nightly. Blood tests will be ordered to assess his levels of vitamin B12, folic acid, and iron.    3. Hypertension.  His amlodipine dosage will be increased to 10 mg. He is advised to monitor his blood pressure regularly and aim for readings below 130/80. A low sodium diet is recommended.    Follow-up  The patient will follow up in 3 months.  Problem List Items Addressed This Visit       Controlled type 2 diabetes " mellitus, without long-term current use of insulin (HCC) (Chronic)    Relevant Orders    POCT  A1C (Completed)    Referral to Podiatry    DM type 2 with diabetic mixed hyperlipidemia (HCC)    Relevant Medications    amLODIPine (NORVASC) 10 MG Tab    Essential hypertension (Chronic)    Relevant Medications    amLODIPine (NORVASC) 10 MG Tab     Other Visit Diagnoses       Neuropathy        Relevant Medications    gabapentin (NEURONTIN) 100 MG Cap    Other Relevant Orders    VITAMIN B12    FOLATE    IRON/TOTAL IRON BIND    FERRITIN    Referral to Podiatry    Iron deficiency        Relevant Orders    IRON/TOTAL IRON BIND    FERRITIN          No follow-ups on file. 3 mo POCT A1C, BP,   Please note that this dictation was created using voice recognition software. I have made every reasonable attempt to correct obvious errors, but I expect that there are errors of grammar and possibly content that I did not discover before finalizing the note.

## 2025-01-22 ENCOUNTER — PHARMACY VISIT (OUTPATIENT)
Dept: PHARMACY | Facility: MEDICAL CENTER | Age: 75
End: 2025-01-22
Payer: COMMERCIAL

## 2025-01-27 PROCEDURE — RXMED WILLOW AMBULATORY MEDICATION CHARGE: Performed by: INTERNAL MEDICINE

## 2025-01-29 ENCOUNTER — PHARMACY VISIT (OUTPATIENT)
Dept: PHARMACY | Facility: MEDICAL CENTER | Age: 75
End: 2025-01-29
Payer: COMMERCIAL

## 2025-01-30 ENCOUNTER — HOSPITAL ENCOUNTER (OUTPATIENT)
Dept: LAB | Facility: MEDICAL CENTER | Age: 75
End: 2025-01-30
Attending: INTERNAL MEDICINE
Payer: MEDICARE

## 2025-01-30 DIAGNOSIS — G62.9 NEUROPATHY: ICD-10-CM

## 2025-01-30 DIAGNOSIS — E61.1 IRON DEFICIENCY: ICD-10-CM

## 2025-01-30 LAB
FERRITIN SERPL-MCNC: 46.3 NG/ML (ref 22–322)
FOLATE SERPL-MCNC: 19.6 NG/ML
IRON SATN MFR SERPL: 32 % (ref 15–55)
IRON SERPL-MCNC: 105 UG/DL (ref 50–180)
TIBC SERPL-MCNC: 330 UG/DL (ref 250–450)
UIBC SERPL-MCNC: 225 UG/DL (ref 110–370)
VIT B12 SERPL-MCNC: 421 PG/ML (ref 211–911)

## 2025-01-30 PROCEDURE — 82607 VITAMIN B-12: CPT

## 2025-01-30 PROCEDURE — 36415 COLL VENOUS BLD VENIPUNCTURE: CPT

## 2025-01-30 PROCEDURE — 82746 ASSAY OF FOLIC ACID SERUM: CPT

## 2025-01-30 PROCEDURE — 83540 ASSAY OF IRON: CPT

## 2025-01-30 PROCEDURE — 82728 ASSAY OF FERRITIN: CPT

## 2025-01-30 PROCEDURE — 83550 IRON BINDING TEST: CPT

## 2025-03-24 ENCOUNTER — PATIENT MESSAGE (OUTPATIENT)
Dept: HEALTH INFORMATION MANAGEMENT | Facility: OTHER | Age: 75
End: 2025-03-24

## 2025-04-22 ENCOUNTER — OFFICE VISIT (OUTPATIENT)
Dept: MEDICAL GROUP | Age: 75
End: 2025-04-22
Payer: MEDICARE

## 2025-04-22 ENCOUNTER — RESULTS FOLLOW-UP (OUTPATIENT)
Dept: MEDICAL GROUP | Age: 75
End: 2025-04-22

## 2025-04-22 VITALS
HEART RATE: 78 BPM | WEIGHT: 216 LBS | DIASTOLIC BLOOD PRESSURE: 70 MMHG | SYSTOLIC BLOOD PRESSURE: 140 MMHG | OXYGEN SATURATION: 96 % | BODY MASS INDEX: 26.86 KG/M2 | TEMPERATURE: 97.4 F | HEIGHT: 75 IN

## 2025-04-22 DIAGNOSIS — E11.69 CONTROLLED TYPE 2 DIABETES MELLITUS WITH OTHER SPECIFIED COMPLICATION, WITHOUT LONG-TERM CURRENT USE OF INSULIN (HCC): ICD-10-CM

## 2025-04-22 DIAGNOSIS — G62.9 NEUROPATHY: ICD-10-CM

## 2025-04-22 DIAGNOSIS — I10 ESSENTIAL HYPERTENSION: Chronic | ICD-10-CM

## 2025-04-22 LAB
HBA1C MFR BLD: 6.1 % (ref ?–5.8)
POCT INT CON NEG: NEGATIVE
POCT INT CON POS: POSITIVE

## 2025-04-22 PROCEDURE — 83036 HEMOGLOBIN GLYCOSYLATED A1C: CPT | Performed by: INTERNAL MEDICINE

## 2025-04-22 PROCEDURE — 3078F DIAST BP <80 MM HG: CPT | Performed by: INTERNAL MEDICINE

## 2025-04-22 PROCEDURE — 99214 OFFICE O/P EST MOD 30 MIN: CPT | Performed by: INTERNAL MEDICINE

## 2025-04-22 PROCEDURE — RXMED WILLOW AMBULATORY MEDICATION CHARGE: Performed by: INTERNAL MEDICINE

## 2025-04-22 PROCEDURE — 3077F SYST BP >= 140 MM HG: CPT | Performed by: INTERNAL MEDICINE

## 2025-04-22 RX ORDER — GABAPENTIN 300 MG/1
300 CAPSULE ORAL NIGHTLY
Qty: 100 CAPSULE | Refills: 3 | Status: SHIPPED | OUTPATIENT
Start: 2025-04-22

## 2025-04-22 RX ORDER — GABAPENTIN 100 MG/1
100-300 CAPSULE ORAL NIGHTLY
Qty: 270 CAPSULE | Refills: 3 | Status: SHIPPED | OUTPATIENT
Start: 2025-04-22

## 2025-04-22 ASSESSMENT — FIBROSIS 4 INDEX: FIB4 SCORE: 1.03

## 2025-04-22 NOTE — PROGRESS NOTES
CC:   Chief Complaint   Patient presents with    Follow-Up     DM A1C     Diagnoses of Controlled type 2 diabetes mellitus with other specified complication, without long-term current use of insulin (HCC), Neuropathy, and Essential hypertension were pertinent to this visit.  Verbal consent was acquired by the patient to use [employer] ambient listening note generation during this visit Yes   History of Present Illness  Matthew is a pleasant 75 y.o. male with a history of diabetes, hypertension, and sleep issues.    Significant stress has been experienced due to his wife's recent diagnosis of frontotemporal dementia, which necessitated her placement in a memory care facility in 02/2025. This stress has resulted in weight loss, bringing his weight below 220 pounds, though he acknowledges this was not a healthy weight loss.     A lack of appetite and sleep is reported, and he admits to a high salt intake, particularly through pretzels, which he consumes frequently. He does not add extra salt to his food. Attempts to substitute nuts for pretzels led to constipation. He typically grazes throughout the day rather than consuming three distinct meals. Regular physical activity, specifically walking, is being engaged in, and a blood pressure log is maintained at home.    Gabapentin has been taken to manage symptoms and improve sleep quality. Initially starting with one tablet, he increased to two, and is currently taking three tablets daily. The medication is preferred in the evening as it aids sleep. One instance of forgetting to take the medication resulted in difficulty sleeping and increased pain.    Past Medical History:   Diagnosis Date    CAD (coronary artery disease) 3/22/2010    CATARACT 3/28/2012    Cataract, diabetic (HCC) 4/2/2015    Dyslipidemia 3/22/2010    High cholesterol     HTN (hypertension), benign 3/22/2010    Hypertension     MEDICAL HOME     Type II or unspecified type diabetes mellitus without  "mention of complication, not stated as uncontrolled     oral meds     Current Outpatient Medications Ordered in Epic   Medication Sig Dispense Refill    gabapentin (NEURONTIN) 300 MG Cap Take 1 Capsule by mouth every evening. 100 Capsule 3    gabapentin (NEURONTIN) 100 MG Cap Take 1-3 Capsules by mouth every evening. 270 Capsule 3    amLODIPine (NORVASC) 10 MG Tab Take 1 Tablet by mouth every evening. 100 Tablet 3    atorvastatin (LIPITOR) 40 MG Tab Take 1 Tablet by mouth every evening. 100 Tablet 3    hydroCHLOROthiazide 25 MG Tab Take 1 tablet by mouth every day. 100 Tablet 3    lisinopril (PRINIVIL) 40 MG tablet Take 1 Tablet by mouth every day. 100 Tablet 3    metFORMIN (GLUCOPHAGE) 500 MG Tab Take 1 Tablet by mouth 2 times a day with meals. 200 Tablet 3    aspirin (ASA) 81 MG Chew Tab chewable tablet Chew 81 mg every day.      Coenzyme Q10 (CO Q 10 PO) Take  by mouth 2 Times a Day.      therapeutic multivitamin-minerals (THERAGRAN-M) TABS Take 1 Tab by mouth every morning.       No current [facility]-ordered facility-administered medications on file.     Review of Systems   All other systems reviewed and are negative.    Objective:     Exam:  BP (!) 140/70 (BP Location: Right arm, Patient Position: Sitting, BP Cuff Size: Adult)   Pulse 78   Temp 36.3 °C (97.4 °F) (Temporal)   Ht 1.905 m (6' 3\")   Wt 98 kg (216 lb)   SpO2 96%   BMI 27.00 kg/m²  Body mass index is 27 kg/m².    Physical Exam  Constitutional:       Appearance: Normal appearance.   HENT:      Head: Normocephalic and atraumatic.   Pulmonary:      Effort: Pulmonary effort is normal. No respiratory distress.   Neurological:      Mental Status: He is alert and oriented to person, place, and time.   Psychiatric:         Mood and Affect: Mood normal.         Behavior: Behavior normal.         Thought Content: Thought content normal.         Judgment: Judgment normal.       Results  Lab Results   Component Value Date/Time    HBA1C 6.1 (A) 04/22/2025 11:40 " AM        Assessment & Plan:   Matthew  is a pleasant 75 y.o. male with the following -   Assessment & Plan  1. Type 2 Diabetes Mellitus.  - A1c level is currently at 6.1%, slightly elevated compared to the previous reading.  - Advised to maintain a balanced diet and consider intermittent fasting or reducing snack intake to help manage blood sugar levels.  - Weight is now <220 lbs, though the weight loss was not achieved through healthy means.  - Regular monitoring of blood sugar levels is recommended.    2. Hypertension.  - Blood pressure readings have consistently been around 140/70 during the last three visits.  - Already on three antihypertensive medications at their maximum dosages.  - Advised to reduce salt intake, particularly by cutting down on pretzels, and to incorporate more vegetables into the diet.  - Regular physical activity, such as a brisk walk for 20 to 30 minutes, is recommended to help manage blood pressure.  - Advised to bring the blood pressure log to the next visit for review. If blood pressure remains at 140, an additional antihypertensive medication may be considered.    3. Neuropathy   - Reports that gabapentin helps with sleep but does not completely alleviate symptoms.  - Prescription for gabapentin 300 mg and 100 mg will be provided.  - Advised to adjust the dosage between 400 mg and 500 mg to find the most effective dose for symptom management.  - Regular use of gabapentin at night is recommended to improve sleep quality.    Problem List Items Addressed This Visit       Controlled type 2 diabetes mellitus, without long-term current use of insulin (HCC) (Chronic)    Relevant Orders    POCT Hemoglobin A1C (Completed)    Essential hypertension (Chronic)    Neuropathy    Relevant Medications    gabapentin (NEURONTIN) 300 MG Cap    gabapentin (NEURONTIN) 100 MG Cap     3 mo POCT A1C     Please note that this dictation was created using voice recognition software. I have made every reasonable  attempt to correct obvious errors, but I expect that there are errors of grammar and possibly content that I did not discover before finalizing the note.

## 2025-04-25 PROCEDURE — RXMED WILLOW AMBULATORY MEDICATION CHARGE: Performed by: INTERNAL MEDICINE

## 2025-05-01 ENCOUNTER — PHARMACY VISIT (OUTPATIENT)
Dept: PHARMACY | Facility: MEDICAL CENTER | Age: 75
End: 2025-05-01
Payer: COMMERCIAL

## 2025-06-09 PROCEDURE — RXMED WILLOW AMBULATORY MEDICATION CHARGE: Performed by: INTERNAL MEDICINE

## 2025-06-11 ENCOUNTER — PHARMACY VISIT (OUTPATIENT)
Dept: PHARMACY | Facility: MEDICAL CENTER | Age: 75
End: 2025-06-11
Payer: COMMERCIAL

## 2025-07-27 PROCEDURE — RXMED WILLOW AMBULATORY MEDICATION CHARGE: Performed by: INTERNAL MEDICINE

## 2025-07-29 ENCOUNTER — PHARMACY VISIT (OUTPATIENT)
Dept: PHARMACY | Facility: MEDICAL CENTER | Age: 75
End: 2025-07-29
Payer: COMMERCIAL

## 2025-08-05 ENCOUNTER — OFFICE VISIT (OUTPATIENT)
Dept: MEDICAL GROUP | Age: 75
End: 2025-08-05
Payer: MEDICARE

## 2025-08-05 ENCOUNTER — RESULTS FOLLOW-UP (OUTPATIENT)
Dept: MEDICAL GROUP | Age: 75
End: 2025-08-05

## 2025-08-05 VITALS
DIASTOLIC BLOOD PRESSURE: 76 MMHG | HEIGHT: 75 IN | SYSTOLIC BLOOD PRESSURE: 124 MMHG | HEART RATE: 76 BPM | BODY MASS INDEX: 26.49 KG/M2 | WEIGHT: 213 LBS | TEMPERATURE: 97.8 F | RESPIRATION RATE: 16 BRPM | OXYGEN SATURATION: 97 %

## 2025-08-05 DIAGNOSIS — I10 HTN (HYPERTENSION), BENIGN: Chronic | ICD-10-CM

## 2025-08-05 DIAGNOSIS — E78.5 DYSLIPIDEMIA: Chronic | ICD-10-CM

## 2025-08-05 DIAGNOSIS — I10 ESSENTIAL HYPERTENSION: Chronic | ICD-10-CM

## 2025-08-05 DIAGNOSIS — E55.9 VITAMIN D DEFICIENCY: ICD-10-CM

## 2025-08-05 DIAGNOSIS — Z12.5 PROSTATE CANCER SCREENING: ICD-10-CM

## 2025-08-05 DIAGNOSIS — E11.69 CONTROLLED TYPE 2 DIABETES MELLITUS WITH OTHER SPECIFIED COMPLICATION, WITHOUT LONG-TERM CURRENT USE OF INSULIN (HCC): Primary | ICD-10-CM

## 2025-08-05 LAB
HBA1C MFR BLD: 6.3 % (ref ?–5.8)
POCT INT CON NEG: NEGATIVE
POCT INT CON POS: POSITIVE

## 2025-08-05 PROCEDURE — 83036 HEMOGLOBIN GLYCOSYLATED A1C: CPT | Performed by: INTERNAL MEDICINE

## 2025-08-05 PROCEDURE — G2211 COMPLEX E/M VISIT ADD ON: HCPCS | Performed by: INTERNAL MEDICINE

## 2025-08-05 PROCEDURE — 3074F SYST BP LT 130 MM HG: CPT | Performed by: INTERNAL MEDICINE

## 2025-08-05 PROCEDURE — 99214 OFFICE O/P EST MOD 30 MIN: CPT | Performed by: INTERNAL MEDICINE

## 2025-08-05 PROCEDURE — 3078F DIAST BP <80 MM HG: CPT | Performed by: INTERNAL MEDICINE

## 2025-08-05 RX ORDER — ATORVASTATIN CALCIUM 40 MG/1
40 TABLET, FILM COATED ORAL EVERY EVENING
Qty: 100 TABLET | Refills: 3 | Status: SHIPPED | OUTPATIENT
Start: 2025-08-05

## 2025-08-05 RX ORDER — AMLODIPINE BESYLATE 10 MG/1
10 TABLET ORAL EVERY EVENING
Qty: 100 TABLET | Refills: 3 | Status: SHIPPED | OUTPATIENT
Start: 2025-08-05

## 2025-08-05 RX ORDER — HYDROCHLOROTHIAZIDE 25 MG/1
25 TABLET ORAL DAILY
Qty: 100 TABLET | Refills: 3 | Status: SHIPPED | OUTPATIENT
Start: 2025-08-05

## 2025-08-05 RX ORDER — LISINOPRIL 40 MG/1
40 TABLET ORAL DAILY
Qty: 100 TABLET | Refills: 3 | Status: SHIPPED | OUTPATIENT
Start: 2025-08-05

## 2025-08-05 ASSESSMENT — ENCOUNTER SYMPTOMS: BRUISES/BLEEDS EASILY: 1

## 2025-08-05 ASSESSMENT — FIBROSIS 4 INDEX: FIB4 SCORE: 1.03

## 2025-08-18 PROCEDURE — RXMED WILLOW AMBULATORY MEDICATION CHARGE: Performed by: INTERNAL MEDICINE

## 2025-08-19 ENCOUNTER — PHARMACY VISIT (OUTPATIENT)
Dept: PHARMACY | Facility: MEDICAL CENTER | Age: 75
End: 2025-08-19
Payer: COMMERCIAL

## (undated) DEVICE — SODIUM CHL IRRIGATION 0.9% 1000ML (12EA/CA)

## (undated) DEVICE — PADDING CAST 4 IN STERILE - 4 X 4 YDS (24/CA)

## (undated) DEVICE — INSTRUMENT ENDOSCOPIC CENTERLINE RELEASE

## (undated) DEVICE — DRESSING TRANSPARENT FILM TEGADERM 2.375 X 2.75"  (100EA/BX)"

## (undated) DEVICE — STERI STRIP COMPOUND BENZOIN - TINCTURE 0.6ML WITH APPLICATOR (40EA/BX)

## (undated) DEVICE — ANTI-FOG SOLUTION - 60BTL/CA

## (undated) DEVICE — BLADE SURGICAL #15 - (50/BX 3BX/CA)

## (undated) DEVICE — SPONGE GAUZESTER. 2X2 4-PL - (2/PK 50PK/BX 30BX/CS)

## (undated) DEVICE — DRESSING XEROFORM 1X8 - (50/BX 4BX/CA)

## (undated) DEVICE — SUTURE GENERAL

## (undated) DEVICE — GLOVE BIOGEL SZ 8 SURGICAL PF LTX - (50PR/BX 4BX/CA)

## (undated) DEVICE — KIT ROOM DECONTAMINATION

## (undated) DEVICE — ELECTRODE DUAL RETURN W/ CORD - (50/PK)

## (undated) DEVICE — SUTURE 4-0 ETHILON PS-2 18 (12PK/BX)"

## (undated) DEVICE — GLOVE, LITE (PAIR)

## (undated) DEVICE — DRESSING TRANSPARENT FILM TEGADERM 4 X 4.75" (50EA/BX)"

## (undated) DEVICE — NEPTUNE 4 PORT MANIFOLD - (20/PK)

## (undated) DEVICE — PACK LOWER EXTREMITY - (2/CA)

## (undated) DEVICE — WRAP CO-FLEX 4IN X 5YD STERIL - SELF-ADHERENT (18/CA)

## (undated) DEVICE — DRAPE LARGE 3 QUARTER - (20/CA)

## (undated) DEVICE — GLOVE BIOGEL INDICATOR SZ 8 SURGICAL PF LTX - (50/BX 4BX/CA)

## (undated) DEVICE — PAD PREP 24 X 48 CUFFED - (100/CA)

## (undated) DEVICE — PACK UPPER EXTREMITY SM OR - (3/CA)

## (undated) DEVICE — LACTATED RINGERS INJ 1000 ML - (14EA/CA 60CA/PF)

## (undated) DEVICE — BLADE BEAVER 6400 MINI EYE ROUND TIP SHARP ON ONE SIDE (20/CA)

## (undated) DEVICE — CHLORAPREP 26 ML APPLICATOR - ORANGE TINT(25/CA)

## (undated) DEVICE — CLOSURE SKIN STRIP 1/2 X 4 IN - (STERI STRIP) (50/BX 4BX/CA)